# Patient Record
Sex: MALE | Race: WHITE | NOT HISPANIC OR LATINO | Employment: OTHER | URBAN - METROPOLITAN AREA
[De-identification: names, ages, dates, MRNs, and addresses within clinical notes are randomized per-mention and may not be internally consistent; named-entity substitution may affect disease eponyms.]

---

## 2018-07-22 ENCOUNTER — APPOINTMENT (OUTPATIENT)
Dept: CT IMAGING | Facility: HOSPITAL | Age: 73
End: 2018-07-22

## 2018-07-22 ENCOUNTER — HOSPITAL ENCOUNTER (EMERGENCY)
Facility: HOSPITAL | Age: 73
Discharge: HOME | End: 2018-07-22
Attending: EMERGENCY MEDICINE | Admitting: EMERGENCY MEDICINE

## 2018-07-22 VITALS
DIASTOLIC BLOOD PRESSURE: 73 MMHG | RESPIRATION RATE: 19 BRPM | WEIGHT: 191.9 LBS | OXYGEN SATURATION: 96 % | HEIGHT: 74 IN | SYSTOLIC BLOOD PRESSURE: 141 MMHG | TEMPERATURE: 97 F | HEART RATE: 44 BPM | BODY MASS INDEX: 24.63 KG/M2

## 2018-07-22 DIAGNOSIS — F03.90 DEMENTIA WITHOUT BEHAVIORAL DISTURBANCE, UNSPECIFIED DEMENTIA TYPE: Primary | ICD-10-CM

## 2018-07-22 DIAGNOSIS — R00.1 SINUS BRADYCARDIA: ICD-10-CM

## 2018-07-22 LAB
ALBUMIN SERPL-MCNC: 4.1 G/DL (ref 3.5–5.2)
ALBUMIN/GLOB SERPL: 2.1 G/DL
ALP SERPL-CCNC: 66 U/L (ref 39–117)
ALT SERPL W P-5'-P-CCNC: 6 U/L (ref 1–41)
AMPHET+METHAMPHET UR QL: NEGATIVE
ANION GAP SERPL CALCULATED.3IONS-SCNC: 12.3 MMOL/L
AST SERPL-CCNC: 11 U/L (ref 1–40)
BARBITURATES UR QL SCN: NEGATIVE
BASOPHILS # BLD AUTO: 0.05 10*3/MM3 (ref 0–0.2)
BASOPHILS NFR BLD AUTO: 0.9 % (ref 0–1.5)
BENZODIAZ UR QL SCN: NEGATIVE
BILIRUB SERPL-MCNC: 0.8 MG/DL (ref 0.1–1.2)
BILIRUB UR QL STRIP: NEGATIVE
BUN BLD-MCNC: 19 MG/DL (ref 8–23)
BUN/CREAT SERPL: 15.1 (ref 7–25)
CALCIUM SPEC-SCNC: 8.9 MG/DL (ref 8.6–10.5)
CANNABINOIDS SERPL QL: NEGATIVE
CHLORIDE SERPL-SCNC: 102 MMOL/L (ref 98–107)
CLARITY UR: CLEAR
CO2 SERPL-SCNC: 25.7 MMOL/L (ref 22–29)
COCAINE UR QL: NEGATIVE
COLOR UR: YELLOW
CREAT BLD-MCNC: 1.26 MG/DL (ref 0.76–1.27)
DEPRECATED RDW RBC AUTO: 45.7 FL (ref 37–54)
EOSINOPHIL # BLD AUTO: 0.22 10*3/MM3 (ref 0–0.7)
EOSINOPHIL NFR BLD AUTO: 3.8 % (ref 0.3–6.2)
ERYTHROCYTE [DISTWIDTH] IN BLOOD BY AUTOMATED COUNT: 13.5 % (ref 11.5–14.5)
ETHANOL BLD-MCNC: <10 MG/DL (ref 0–10)
ETHANOL UR QL: <0.01 %
GFR SERPL CREATININE-BSD FRML MDRD: 56 ML/MIN/1.73
GLOBULIN UR ELPH-MCNC: 2 GM/DL
GLUCOSE BLD-MCNC: 79 MG/DL (ref 65–99)
GLUCOSE BLDC GLUCOMTR-MCNC: 76 MG/DL (ref 70–130)
GLUCOSE UR STRIP-MCNC: NEGATIVE MG/DL
HCT VFR BLD AUTO: 43.1 % (ref 40.4–52.2)
HGB BLD-MCNC: 14.1 G/DL (ref 13.7–17.6)
HGB UR QL STRIP.AUTO: NEGATIVE
IMM GRANULOCYTES # BLD: 0.01 10*3/MM3 (ref 0–0.03)
IMM GRANULOCYTES NFR BLD: 0.2 % (ref 0–0.5)
KETONES UR QL STRIP: NEGATIVE
LEUKOCYTE ESTERASE UR QL STRIP.AUTO: NEGATIVE
LYMPHOCYTES # BLD AUTO: 2.37 10*3/MM3 (ref 0.9–4.8)
LYMPHOCYTES NFR BLD AUTO: 41 % (ref 19.6–45.3)
MCH RBC QN AUTO: 30.6 PG (ref 27–32.7)
MCHC RBC AUTO-ENTMCNC: 32.7 G/DL (ref 32.6–36.4)
MCV RBC AUTO: 93.5 FL (ref 79.8–96.2)
METHADONE UR QL SCN: NEGATIVE
MONOCYTES # BLD AUTO: 0.44 10*3/MM3 (ref 0.2–1.2)
MONOCYTES NFR BLD AUTO: 7.6 % (ref 5–12)
NEUTROPHILS # BLD AUTO: 2.7 10*3/MM3 (ref 1.9–8.1)
NEUTROPHILS NFR BLD AUTO: 46.7 % (ref 42.7–76)
NITRITE UR QL STRIP: NEGATIVE
OPIATES UR QL: NEGATIVE
OXYCODONE UR QL SCN: NEGATIVE
PH UR STRIP.AUTO: 8 [PH] (ref 5–8)
PLATELET # BLD AUTO: 220 10*3/MM3 (ref 140–500)
PMV BLD AUTO: 10.5 FL (ref 6–12)
POTASSIUM BLD-SCNC: 3.9 MMOL/L (ref 3.5–5.2)
PROT SERPL-MCNC: 6.1 G/DL (ref 6–8.5)
PROT UR QL STRIP: NEGATIVE
RBC # BLD AUTO: 4.61 10*6/MM3 (ref 4.6–6)
SODIUM BLD-SCNC: 140 MMOL/L (ref 136–145)
SP GR UR STRIP: 1.01 (ref 1–1.03)
TROPONIN T SERPL-MCNC: <0.01 NG/ML (ref 0–0.03)
UROBILINOGEN UR QL STRIP: NORMAL
WBC NRBC COR # BLD: 5.78 10*3/MM3 (ref 4.5–10.7)

## 2018-07-22 PROCEDURE — 84484 ASSAY OF TROPONIN QUANT: CPT | Performed by: EMERGENCY MEDICINE

## 2018-07-22 PROCEDURE — 93005 ELECTROCARDIOGRAM TRACING: CPT | Performed by: EMERGENCY MEDICINE

## 2018-07-22 PROCEDURE — 80307 DRUG TEST PRSMV CHEM ANLYZR: CPT | Performed by: EMERGENCY MEDICINE

## 2018-07-22 PROCEDURE — 81003 URINALYSIS AUTO W/O SCOPE: CPT | Performed by: EMERGENCY MEDICINE

## 2018-07-22 PROCEDURE — 93010 ELECTROCARDIOGRAM REPORT: CPT | Performed by: INTERNAL MEDICINE

## 2018-07-22 PROCEDURE — 85025 COMPLETE CBC W/AUTO DIFF WBC: CPT | Performed by: EMERGENCY MEDICINE

## 2018-07-22 PROCEDURE — 70450 CT HEAD/BRAIN W/O DYE: CPT

## 2018-07-22 PROCEDURE — 99285 EMERGENCY DEPT VISIT HI MDM: CPT

## 2018-07-22 PROCEDURE — 82962 GLUCOSE BLOOD TEST: CPT

## 2018-07-22 PROCEDURE — 80053 COMPREHEN METABOLIC PANEL: CPT | Performed by: EMERGENCY MEDICINE

## 2018-07-22 RX ORDER — SODIUM CHLORIDE 0.9 % (FLUSH) 0.9 %
10 SYRINGE (ML) INJECTION AS NEEDED
Status: DISCONTINUED | OUTPATIENT
Start: 2018-07-22 | End: 2018-07-22 | Stop reason: HOSPADM

## 2018-08-06 ENCOUNTER — APPOINTMENT (OUTPATIENT)
Dept: GENERAL RADIOLOGY | Facility: HOSPITAL | Age: 73
End: 2018-08-06

## 2018-08-06 ENCOUNTER — HOSPITAL ENCOUNTER (INPATIENT)
Facility: HOSPITAL | Age: 73
LOS: 7 days | Discharge: NURSING, REHAB OR LONG-TERM CARE | End: 2018-08-13
Attending: EMERGENCY MEDICINE | Admitting: ORTHOPAEDIC SURGERY

## 2018-08-06 DIAGNOSIS — I45.5 SINUS PAUSE: ICD-10-CM

## 2018-08-06 DIAGNOSIS — R26.2 DIFFICULTY WALKING: ICD-10-CM

## 2018-08-06 DIAGNOSIS — F03.91 DEMENTIA WITH BEHAVIORAL DISTURBANCE, UNSPECIFIED DEMENTIA TYPE: ICD-10-CM

## 2018-08-06 DIAGNOSIS — S72.001A CLOSED DISPLACED FRACTURE OF RIGHT FEMORAL NECK (HCC): Primary | ICD-10-CM

## 2018-08-06 LAB
ABO GROUP BLD: NORMAL
ALBUMIN SERPL-MCNC: 4.1 G/DL (ref 3.5–5.2)
ALBUMIN/GLOB SERPL: 2.2 G/DL
ALP SERPL-CCNC: 71 U/L (ref 39–117)
ALT SERPL W P-5'-P-CCNC: 11 U/L (ref 1–41)
ANION GAP SERPL CALCULATED.3IONS-SCNC: 13.9 MMOL/L
AST SERPL-CCNC: 14 U/L (ref 1–40)
BASOPHILS # BLD AUTO: 0.03 10*3/MM3 (ref 0–0.2)
BASOPHILS NFR BLD AUTO: 0.5 % (ref 0–1.5)
BILIRUB SERPL-MCNC: 0.4 MG/DL (ref 0.1–1.2)
BLD GP AB SCN SERPL QL: NEGATIVE
BUN BLD-MCNC: 19 MG/DL (ref 8–23)
BUN/CREAT SERPL: 14.6 (ref 7–25)
CALCIUM SPEC-SCNC: 8.4 MG/DL (ref 8.6–10.5)
CHLORIDE SERPL-SCNC: 105 MMOL/L (ref 98–107)
CO2 SERPL-SCNC: 24.1 MMOL/L (ref 22–29)
CREAT BLD-MCNC: 1.3 MG/DL (ref 0.76–1.27)
DEPRECATED RDW RBC AUTO: 45.5 FL (ref 37–54)
EOSINOPHIL # BLD AUTO: 0.13 10*3/MM3 (ref 0–0.7)
EOSINOPHIL NFR BLD AUTO: 2.3 % (ref 0.3–6.2)
ERYTHROCYTE [DISTWIDTH] IN BLOOD BY AUTOMATED COUNT: 13.3 % (ref 11.5–14.5)
GFR SERPL CREATININE-BSD FRML MDRD: 54 ML/MIN/1.73
GLOBULIN UR ELPH-MCNC: 1.9 GM/DL
GLUCOSE BLD-MCNC: 89 MG/DL (ref 65–99)
HCT VFR BLD AUTO: 43 % (ref 40.4–52.2)
HGB BLD-MCNC: 14.4 G/DL (ref 13.7–17.6)
IMM GRANULOCYTES # BLD: 0.01 10*3/MM3 (ref 0–0.03)
IMM GRANULOCYTES NFR BLD: 0.2 % (ref 0–0.5)
INR PPP: 1.56 (ref 0.9–1.1)
LYMPHOCYTES # BLD AUTO: 1.44 10*3/MM3 (ref 0.9–4.8)
LYMPHOCYTES NFR BLD AUTO: 25.3 % (ref 19.6–45.3)
MCH RBC QN AUTO: 31.2 PG (ref 27–32.7)
MCHC RBC AUTO-ENTMCNC: 33.5 G/DL (ref 32.6–36.4)
MCV RBC AUTO: 93.1 FL (ref 79.8–96.2)
MONOCYTES # BLD AUTO: 0.27 10*3/MM3 (ref 0.2–1.2)
MONOCYTES NFR BLD AUTO: 4.7 % (ref 5–12)
NEUTROPHILS # BLD AUTO: 3.83 10*3/MM3 (ref 1.9–8.1)
NEUTROPHILS NFR BLD AUTO: 67.2 % (ref 42.7–76)
PLATELET # BLD AUTO: 198 10*3/MM3 (ref 140–500)
PMV BLD AUTO: 10.7 FL (ref 6–12)
POTASSIUM BLD-SCNC: 4.1 MMOL/L (ref 3.5–5.2)
PROT SERPL-MCNC: 6 G/DL (ref 6–8.5)
PROTHROMBIN TIME: 18.4 SECONDS (ref 11.7–14.2)
RBC # BLD AUTO: 4.62 10*6/MM3 (ref 4.6–6)
RH BLD: POSITIVE
SODIUM BLD-SCNC: 143 MMOL/L (ref 136–145)
T&S EXPIRATION DATE: NORMAL
TROPONIN T SERPL-MCNC: <0.01 NG/ML (ref 0–0.03)
TROPONIN T SERPL-MCNC: <0.01 NG/ML (ref 0–0.03)
WBC NRBC COR # BLD: 5.7 10*3/MM3 (ref 4.5–10.7)

## 2018-08-06 PROCEDURE — 93005 ELECTROCARDIOGRAM TRACING: CPT | Performed by: EMERGENCY MEDICINE

## 2018-08-06 PROCEDURE — 80053 COMPREHEN METABOLIC PANEL: CPT | Performed by: EMERGENCY MEDICINE

## 2018-08-06 PROCEDURE — 25010000002 MORPHINE PER 10 MG: Performed by: EMERGENCY MEDICINE

## 2018-08-06 PROCEDURE — 86850 RBC ANTIBODY SCREEN: CPT | Performed by: EMERGENCY MEDICINE

## 2018-08-06 PROCEDURE — 85025 COMPLETE CBC W/AUTO DIFF WBC: CPT | Performed by: EMERGENCY MEDICINE

## 2018-08-06 PROCEDURE — 84484 ASSAY OF TROPONIN QUANT: CPT | Performed by: EMERGENCY MEDICINE

## 2018-08-06 PROCEDURE — 73560 X-RAY EXAM OF KNEE 1 OR 2: CPT

## 2018-08-06 PROCEDURE — 25010000002 LORAZEPAM PER 2 MG: Performed by: EMERGENCY MEDICINE

## 2018-08-06 PROCEDURE — 85610 PROTHROMBIN TIME: CPT | Performed by: EMERGENCY MEDICINE

## 2018-08-06 PROCEDURE — 73502 X-RAY EXAM HIP UNI 2-3 VIEWS: CPT

## 2018-08-06 PROCEDURE — 93010 ELECTROCARDIOGRAM REPORT: CPT | Performed by: INTERNAL MEDICINE

## 2018-08-06 PROCEDURE — 73552 X-RAY EXAM OF FEMUR 2/>: CPT

## 2018-08-06 PROCEDURE — 71045 X-RAY EXAM CHEST 1 VIEW: CPT

## 2018-08-06 PROCEDURE — 99285 EMERGENCY DEPT VISIT HI MDM: CPT

## 2018-08-06 PROCEDURE — 25010000002 ONDANSETRON PER 1 MG: Performed by: EMERGENCY MEDICINE

## 2018-08-06 PROCEDURE — 86901 BLOOD TYPING SEROLOGIC RH(D): CPT | Performed by: EMERGENCY MEDICINE

## 2018-08-06 PROCEDURE — 86900 BLOOD TYPING SEROLOGIC ABO: CPT | Performed by: EMERGENCY MEDICINE

## 2018-08-06 RX ORDER — POTASSIUM CHLORIDE 750 MG/1
10 TABLET, FILM COATED, EXTENDED RELEASE ORAL DAILY
COMMUNITY
End: 2018-08-13 | Stop reason: HOSPADM

## 2018-08-06 RX ORDER — CITALOPRAM 20 MG/1
20 TABLET ORAL DAILY
COMMUNITY
End: 2021-02-01 | Stop reason: HOSPADM

## 2018-08-06 RX ORDER — FUROSEMIDE 20 MG/1
20 TABLET ORAL DAILY
COMMUNITY
End: 2018-08-13 | Stop reason: HOSPADM

## 2018-08-06 RX ORDER — ATORVASTATIN CALCIUM 20 MG/1
20 TABLET, FILM COATED ORAL
COMMUNITY
End: 2021-02-01 | Stop reason: HOSPADM

## 2018-08-06 RX ORDER — SODIUM CHLORIDE 0.9 % (FLUSH) 0.9 %
10 SYRINGE (ML) INJECTION AS NEEDED
Status: DISCONTINUED | OUTPATIENT
Start: 2018-08-06 | End: 2018-08-13 | Stop reason: HOSPADM

## 2018-08-06 RX ORDER — TRAZODONE HYDROCHLORIDE 50 MG/1
25 TABLET ORAL
COMMUNITY
End: 2021-02-01 | Stop reason: HOSPADM

## 2018-08-06 RX ORDER — LORAZEPAM 2 MG/ML
0.5 INJECTION INTRAMUSCULAR ONCE
Status: DISCONTINUED | OUTPATIENT
Start: 2018-08-06 | End: 2018-08-06

## 2018-08-06 RX ORDER — LORAZEPAM 2 MG/ML
0.5 INJECTION INTRAMUSCULAR ONCE
Status: COMPLETED | OUTPATIENT
Start: 2018-08-06 | End: 2018-08-06

## 2018-08-06 RX ORDER — ONDANSETRON 2 MG/ML
4 INJECTION INTRAMUSCULAR; INTRAVENOUS ONCE
Status: COMPLETED | OUTPATIENT
Start: 2018-08-06 | End: 2018-08-06

## 2018-08-06 RX ORDER — LANOLIN ALCOHOL/MO/W.PET/CERES
1000 CREAM (GRAM) TOPICAL DAILY
COMMUNITY
End: 2021-02-01 | Stop reason: HOSPADM

## 2018-08-06 RX ORDER — BUPROPION HYDROCHLORIDE 150 MG/1
150 TABLET ORAL EVERY MORNING
COMMUNITY
End: 2021-02-01 | Stop reason: HOSPADM

## 2018-08-06 RX ORDER — DONEPEZIL HYDROCHLORIDE 5 MG/1
10 TABLET, FILM COATED ORAL NIGHTLY
COMMUNITY

## 2018-08-06 RX ADMIN — LORAZEPAM 0.5 MG: 2 INJECTION INTRAMUSCULAR; INTRAVENOUS at 19:57

## 2018-08-06 RX ADMIN — ATROPINE SULFATE 0.5 MG: 0.1 INJECTION, SOLUTION INTRAVENOUS at 19:25

## 2018-08-06 RX ADMIN — ONDANSETRON HYDROCHLORIDE 4 MG: 2 INJECTION, SOLUTION INTRAMUSCULAR; INTRAVENOUS at 21:02

## 2018-08-06 RX ADMIN — SODIUM CHLORIDE 500 ML: 9 INJECTION, SOLUTION INTRAVENOUS at 19:25

## 2018-08-06 RX ADMIN — MORPHINE SULFATE 4 MG: 4 INJECTION INTRAVENOUS at 21:03

## 2018-08-06 NOTE — ED NOTES
"Family to nursing station, states: \"I think he's having a seizure, he's not talking and he's shaking.\"  On assessment, pt exhibits tonic-clonic activity, acutely unresponsive for approx 30 seconds.  HR to asystole on monitor for approx 15 seconds; while palpating for pulse, HR up to 40 on monitor, pulse palpable.     Alphonse Shafer RN  08/06/18 1926       Alphonse Shafer RN  08/06/18 1933    "

## 2018-08-06 NOTE — PROGRESS NOTES
Clinical Pharmacy Services: Medication History    Harsha Mejia is a 73 y.o. male presenting to [facility] for   Chief Complaint   Patient presents with   • Fall       He  has no past medical history on file.    Allergies as of 08/06/2018   • (No Known Allergies)       Medication information was obtained from: Nursing home  Pharmacy and Phone Number:     Prior to Admission Medications     Prescriptions Last Dose Informant Patient Reported? Taking?    atorvastatin (LIPITOR) 20 MG tablet  Nursing Home Yes Yes    Take 20 mg by mouth every night at bedtime.    buPROPion XL (WELLBUTRIN XL) 150 MG 24 hr tablet  Nursing Home Yes Yes    Take 150 mg by mouth Every Morning.    citalopram (CeleXA) 20 MG tablet  Nursing Home Yes Yes    Take 20 mg by mouth Daily.    donepezil (ARICEPT) 5 MG tablet  Nursing Home Yes Yes    Take 5 mg by mouth every night at bedtime.    furosemide (LASIX) 20 MG tablet  Nursing Home Yes Yes    Take 20 mg by mouth Daily.    potassium chloride (K-DUR) 10 MEQ CR tablet  Nursing Home Yes Yes    Take 10 mEq by mouth Daily.    rivaroxaban (XARELTO) 20 MG tablet  Nursing Home Yes Yes    Take 20 mg by mouth Daily.    traZODone (DESYREL) 50 MG tablet  Nursing Home Yes Yes    Take 25 mg by mouth every night at bedtime.    vitamin B-12 (CYANOCOBALAMIN) 1000 MCG tablet  Nursing Home Yes Yes    Take 1,000 mcg by mouth Daily.            Medication notes: All medications added to profile per facility MAR    This medication list is complete to the best of my knowledge as of 8/6/2018    Please call if questions.    Aziza Conte, Medication History Technician  8/6/2018 7:24 PM

## 2018-08-06 NOTE — ED PROVIDER NOTES
EMERGENCY DEPARTMENT ENCOUNTER    CHIEF COMPLAINT  Chief Complaint: Fall  History given by: Pt and family  History limited by: Dementia   Room Number: 15/15  PMD: Provider, No Known      HPI:  Pt is a 73 y.o. male who presents complaining of fall that happened earlier today. Pt confirms right leg and hip pain. Pt's family states he is at the [place] nursing home and he threw a chair and fell down on his right side. Pt's family states he has hx of dementia. Pt is on Xarelto as a anticoagulant.     Duration:  Earlier today a couple hours ago   Onset: sudden  Timing: constant  Location: right leg and hip   Radiation: none stated  Quality: pain  Intensity/Severity: moderate  Progression: unchanged  Associated Symptoms: none stated  Aggravating Factors: none   Alleviating Factors: none  Previous Episodes: none stated  Treatment before arrival: none stated     PAST MEDICAL HISTORY  Active Ambulatory Problems     Diagnosis Date Noted   • No Active Ambulatory Problems     Resolved Ambulatory Problems     Diagnosis Date Noted   • No Resolved Ambulatory Problems     Past Medical History:   Diagnosis Date   • Dementia    • Hyperlipidemia        PAST SURGICAL HISTORY  History reviewed. No pertinent surgical history.    FAMILY HISTORY  History reviewed. No pertinent family history.    SOCIAL HISTORY  Social History     Social History   • Marital status: Single     Spouse name: N/A   • Number of children: N/A   • Years of education: N/A     Occupational History   • Not on file.     Social History Main Topics   • Smoking status: Former Smoker   • Smokeless tobacco: Not on file   • Alcohol use No   • Drug use: No   • Sexual activity: Defer     Other Topics Concern   • Not on file     Social History Narrative   • No narrative on file       ALLERGIES  Patient has no known allergies.    REVIEW OF SYSTEMS  Review of Systems   Unable to perform ROS: Dementia   Musculoskeletal: Positive for arthralgias (right hip pain) and myalgias  (right leg pain).       PHYSICAL EXAM  ED Triage Vitals [08/06/18 1629]   Temp Heart Rate Resp BP SpO2   -- 56 18 128/71 100 %      Temp src Heart Rate Source Patient Position BP Location FiO2 (%)   -- Monitor -- -- --       Physical Exam   Constitutional: He is oriented to person, place, and time. No distress.   Pt is confused (which is baseline), pleasantly demented.    HENT:   Head: Normocephalic and atraumatic.   Mouth/Throat: Oropharynx is clear and moist.   Eyes: Pupils are equal, round, and reactive to light. EOM are normal.   Neck: Normal range of motion. Neck supple.   Cardiovascular: Normal rate, regular rhythm and normal heart sounds.    Pulmonary/Chest: Effort normal and breath sounds normal. No respiratory distress.   Abdominal: Soft. There is no tenderness. There is no rebound and no guarding.   Musculoskeletal: Normal range of motion. He exhibits edema (pedal edema on RLE from DVT) and tenderness (right hip tenderness,  distal right femur tenderness, but no C-spine tenderness).   Neurological: He is alert and oriented to person, place, and time. He has normal sensation and normal strength.   Skin: Skin is warm and dry.   Psychiatric: Mood and affect normal.   Nursing note and vitals reviewed.    LABS:  Results for orders placed or performed during the hospital encounter of 08/06/18   Comprehensive Metabolic Panel   Result Value Ref Range    Glucose 89 65 - 99 mg/dL    BUN 19 8 - 23 mg/dL    Creatinine 1.30 (H) 0.76 - 1.27 mg/dL    Sodium 143 136 - 145 mmol/L    Potassium 4.1 3.5 - 5.2 mmol/L    Chloride 105 98 - 107 mmol/L    CO2 24.1 22.0 - 29.0 mmol/L    Calcium 8.4 (L) 8.6 - 10.5 mg/dL    Total Protein 6.0 6.0 - 8.5 g/dL    Albumin 4.10 3.50 - 5.20 g/dL    ALT (SGPT) 11 1 - 41 U/L    AST (SGOT) 14 1 - 40 U/L    Alkaline Phosphatase 71 39 - 117 U/L    Total Bilirubin 0.4 0.1 - 1.2 mg/dL    eGFR Non African Amer 54 (L) >60 mL/min/1.73    Globulin 1.9 gm/dL    A/G Ratio 2.2 g/dL    BUN/Creatinine  Ratio 14.6 7.0 - 25.0    Anion Gap 13.9 mmol/L   Protime-INR   Result Value Ref Range    Protime 18.4 (H) 11.7 - 14.2 Seconds    INR 1.56 (H) 0.90 - 1.10   Troponin   Result Value Ref Range    Troponin T <0.010 0.000 - 0.030 ng/mL   CBC Auto Differential   Result Value Ref Range    WBC 5.70 4.50 - 10.70 10*3/mm3    RBC 4.62 4.60 - 6.00 10*6/mm3    Hemoglobin 14.4 13.7 - 17.6 g/dL    Hematocrit 43.0 40.4 - 52.2 %    MCV 93.1 79.8 - 96.2 fL    MCH 31.2 27.0 - 32.7 pg    MCHC 33.5 32.6 - 36.4 g/dL    RDW 13.3 11.5 - 14.5 %    RDW-SD 45.5 37.0 - 54.0 fl    MPV 10.7 6.0 - 12.0 fL    Platelets 198 140 - 500 10*3/mm3    Neutrophil % 67.2 42.7 - 76.0 %    Lymphocyte % 25.3 19.6 - 45.3 %    Monocyte % 4.7 (L) 5.0 - 12.0 %    Eosinophil % 2.3 0.3 - 6.2 %    Basophil % 0.5 0.0 - 1.5 %    Immature Grans % 0.2 0.0 - 0.5 %    Neutrophils, Absolute 3.83 1.90 - 8.10 10*3/mm3    Lymphocytes, Absolute 1.44 0.90 - 4.80 10*3/mm3    Monocytes, Absolute 0.27 0.20 - 1.20 10*3/mm3    Eosinophils, Absolute 0.13 0.00 - 0.70 10*3/mm3    Basophils, Absolute 0.03 0.00 - 0.20 10*3/mm3    Immature Grans, Absolute 0.01 0.00 - 0.03 10*3/mm3   Troponin   Result Value Ref Range    Troponin T <0.010 0.000 - 0.030 ng/mL   Type & Screen   Result Value Ref Range    ABO Type A     RH type Positive     Antibody Screen Negative     T&S Expiration Date 8/9/2018 11:59:59 PM              RADIOLOGY  XR Chest 1 View   Final Result      XR Hip With or Without Pelvis 2 - 3 View Right   Final Result      XR Knee 1 or 2 View Right   Final Result      XR Femur 2 View Right   Final Result         XR Right Hip - shows acute nondisplaced fracture in femoral neck.   XR Right Femur and XR Right Knee- show no fractures.     I ordered the above noted radiological studies. Interpreted by radiologist. Discussed with radiologist (Dr. Valencia). Reviewed by me in PACS.       PROCEDURES  Critical Care  Performed by: EB DOWNEY  Authorized by: EB DOWNEY     Critical care  provider statement:     Critical care time (minutes):  30    Critical care time was exclusive of:  Separately billable procedures and treating other patients    Critical care was necessary to treat or prevent imminent or life-threatening deterioration of the following conditions:  Cardiac failure and circulatory failure    Critical care was time spent personally by me on the following activities:  Development of treatment plan with patient or surrogate, discussions with consultants, evaluation of patient's response to treatment, examination of patient, obtaining history from patient or surrogate, ordering and review of laboratory studies, ordering and performing treatments and interventions, ordering and review of radiographic studies, pulse oximetry, re-evaluation of patient's condition and review of old charts           EKG    EKG time: 1813  Rhythm/Rate: Kevin, 55  PVCs  No Acute Ischemia  Non-Specific ST-T changes    unchanged compared to prior on 7/22/2018.      Interpreted Contemporaneously by me.  Independently viewed by me      EKG    EKG time: 19323  Rhythm/Rate: NSR, 63  No Acute Ischemia  Non-Specific ST-T changes    unchanged compared to prior 1 hour and 15 minutes ago.     Interpreted Contemporaneously by me.  Independently viewed by me        PROGRESS AND CONSULTS       1701  XR Right Femur, XR Right Knee, XR Right Hip ordered.     1747  Rechecked pt. Pt is resting comfortably. Notified pt of unremarkable XR Right Knee and XR Hip, including a femoral neck fracture. Discussed the plan to call Orthopedic doctor and to admit pt to hospital because surgery is needed for this fracture. Pt understands and agrees with the plan, all questions answered.    1756  CXR, Pro-INR, Troponin, EKG, Type and Screen, and labs ordered. Call placed to [facility] and Ortho.     1813  Discussed pt's case with Dr. Avery ([facility]) who agrees to admit pt to hospital.     1820  Discussed pt's case with Dr. Castle (Ortho) who asked to  hold the pt's Xarelto and will do the procedure tomorrow hopefully.     1923  Pt was found to be in asystole for 15 seconds with syncope. Pt's family states he was stiff and shaking after trying to sit up in bed.    1927  Pt back at baseline. Pt denies CP, SOA. Notified family of changing plan.     1928  Atropine sulfate injection, , Repeat Troponin, Repeat EKG ordered.     1950  Rechecked pt. Pt is shaking. Pt states he does not know if he is in pain. He is not having tonic/clonic movements and is talking with me. Discussed the plan to give pain medication. Pt understands and agrees with the plan, all questions answered.    2013  Rechecked pt. Pt is resting comfortably. Discussed the plan to place call to Cardiologist. Pt understands and agrees with the plan, all questions answered.    2019  Discussed pt's case with Dr. Bermudez (Cardiologist) to consult pt and feels patient is stable for admission to telemetry bed.    2031  Morphine injection, Zofran ordered.     2035  Discussed pt's case with Dr. Avery ([facility]) who will admit pt to tele bed aware of sinus pause. Repeat EKGs and Troponins are normal.           MEDICAL DECISION MAKING  Results were reviewed/discussed with the patient and they were also made aware of online access. Pt also made aware that some labs, such as cultures, will not be resulted during ER visit and follow up with PMD is necessary.     MDM  Number of Diagnoses or Management Options  Closed nondisplaced fracture of right femoral neck (CMS/HCC):   Dementia with behavioral disturbance, unspecified dementia type:   Sinus pause:      Amount and/or Complexity of Data Reviewed  Clinical lab tests: ordered and reviewed (Troponin=<0.010)  Tests in the radiology section of CPT®: ordered and reviewed (XR Right Hip - shows acute nondisplaced fracture in femoral neck. )  Tests in the medicine section of CPT®: ordered and reviewed (See EKG Procedure note. )  Decide to obtain previous medical records or  to obtain history from someone other than the patient: yes  Obtain history from someone other than the patient: yes (Pt's family. )  Review and summarize past medical records: yes  Discuss the patient with other providers: yes (Dr. Avery ([facility]) and Dr. Castle (Ortho). )  Independent visualization of images, tracings, or specimens: yes    Critical Care  Total time providing critical care: 30-74 minutes         DIAGNOSIS  Final diagnoses:   Closed nondisplaced fracture of right femoral neck (CMS/HCC)   Dementia with behavioral disturbance, unspecified dementia type   Sinus pause       DISPOSITION  ADMISSION    Discussed treatment plan and reason for admission with pt/family and admitting physician.  Pt/family voiced understanding of the plan for admission for further testing/treatment as needed.         Latest Documented Vital Signs:  As of 8:52 PM  BP- 125/90 HR- 68 Temp- 97.8 °F (36.6 °C) (Oral) O2 sat- 94%    --  Documentation assistance provided by orin Aguilar for Dr. Jose. Information recorded by the scribe was done at my direction and has been verified and validated by me.              Eladia Kelley  08/06/18 6442       Carloz Jose MD  08/07/18 7987

## 2018-08-06 NOTE — ED TRIAGE NOTES
PT IN [facility] UN[facility], PT BECAME AGITATED AND ATTEMPTED TO THROW A CHAIR AND FELL. PT HAS RIGHT UPPER LEG PAIN AND TENDERNESS. NO HEAD INJURY

## 2018-08-06 NOTE — ED NOTES
"Pt currently acutely responsive to verbal prompting, states: \"I feel pretty relaxed right now.\"     Alphonse Shafer RN  08/06/18 1930    "

## 2018-08-07 ENCOUNTER — ANESTHESIA EVENT (OUTPATIENT)
Dept: PERIOP | Facility: HOSPITAL | Age: 73
End: 2018-08-07

## 2018-08-07 ENCOUNTER — APPOINTMENT (OUTPATIENT)
Dept: GENERAL RADIOLOGY | Facility: HOSPITAL | Age: 73
End: 2018-08-07

## 2018-08-07 ENCOUNTER — ANESTHESIA (OUTPATIENT)
Dept: PERIOP | Facility: HOSPITAL | Age: 73
End: 2018-08-07

## 2018-08-07 ENCOUNTER — APPOINTMENT (OUTPATIENT)
Dept: CARDIOLOGY | Facility: HOSPITAL | Age: 73
End: 2018-08-07
Attending: INTERNAL MEDICINE

## 2018-08-07 LAB
ANION GAP SERPL CALCULATED.3IONS-SCNC: 10.3 MMOL/L
BH CV ECHO MEAS - ACS: 2.1 CM
BH CV ECHO MEAS - AO MAX PG: 7 MMHG
BH CV ECHO MEAS - AO MEAN PG (FULL): 1 MMHG
BH CV ECHO MEAS - AO MEAN PG: 3 MMHG
BH CV ECHO MEAS - AO ROOT AREA (BSA CORRECTED): 1.6
BH CV ECHO MEAS - AO ROOT AREA: 8.6 CM^2
BH CV ECHO MEAS - AO ROOT DIAM: 3.3 CM
BH CV ECHO MEAS - AO V2 MAX: 133 CM/SEC
BH CV ECHO MEAS - AO V2 MEAN: 85.7 CM/SEC
BH CV ECHO MEAS - AO V2 VTI: 28.4 CM
BH CV ECHO MEAS - AVA(I,A): 3 CM^2
BH CV ECHO MEAS - AVA(I,D): 3 CM^2
BH CV ECHO MEAS - BSA(HAYCOCK): 2.1 M^2
BH CV ECHO MEAS - BSA: 2.1 M^2
BH CV ECHO MEAS - BZI_BMI: 24 KILOGRAMS/M^2
BH CV ECHO MEAS - BZI_METRIC_HEIGHT: 188 CM
BH CV ECHO MEAS - BZI_METRIC_WEIGHT: 84.8 KG
BH CV ECHO MEAS - CONTRAST EF 4CH: 64.5 ML/M^2
BH CV ECHO MEAS - EDV(CUBED): 125 ML
BH CV ECHO MEAS - EDV(MOD-SP4): 76 ML
BH CV ECHO MEAS - EDV(TEICH): 118.2 ML
BH CV ECHO MEAS - EF(CUBED): 68.6 %
BH CV ECHO MEAS - EF(MOD-BP): 64 %
BH CV ECHO MEAS - EF(MOD-SP4): 64.5 %
BH CV ECHO MEAS - EF(TEICH): 59.9 %
BH CV ECHO MEAS - ESV(CUBED): 39.3 ML
BH CV ECHO MEAS - ESV(MOD-SP4): 27 ML
BH CV ECHO MEAS - ESV(TEICH): 47.4 ML
BH CV ECHO MEAS - FS: 32 %
BH CV ECHO MEAS - IVS/LVPW: 1
BH CV ECHO MEAS - IVSD: 1 CM
BH CV ECHO MEAS - LAT PEAK E' VEL: 10 CM/SEC
BH CV ECHO MEAS - LV DIASTOLIC VOL/BSA (35-75): 36 ML/M^2
BH CV ECHO MEAS - LV MASS(C)D: 182 GRAMS
BH CV ECHO MEAS - LV MASS(C)DI: 86.2 GRAMS/M^2
BH CV ECHO MEAS - LV MEAN PG: 2 MMHG
BH CV ECHO MEAS - LV SYSTOLIC VOL/BSA (12-30): 12.8 ML/M^2
BH CV ECHO MEAS - LV V1 MAX: 111 CM/SEC
BH CV ECHO MEAS - LV V1 MEAN: 69.2 CM/SEC
BH CV ECHO MEAS - LV V1 VTI: 24.4 CM
BH CV ECHO MEAS - LVIDD: 5 CM
BH CV ECHO MEAS - LVIDS: 3.4 CM
BH CV ECHO MEAS - LVLD AP4: 7.3 CM
BH CV ECHO MEAS - LVLS AP4: 5.6 CM
BH CV ECHO MEAS - LVOT AREA (M): 3.5 CM^2
BH CV ECHO MEAS - LVOT AREA: 3.5 CM^2
BH CV ECHO MEAS - LVOT DIAM: 2.1 CM
BH CV ECHO MEAS - LVPWD: 1 CM
BH CV ECHO MEAS - MED PEAK E' VEL: 8 CM/SEC
BH CV ECHO MEAS - MR MAX PG: 46.8 MMHG
BH CV ECHO MEAS - MR MAX VEL: 342 CM/SEC
BH CV ECHO MEAS - MV A DUR: 0.17 SEC
BH CV ECHO MEAS - MV A MAX VEL: 66.6 CM/SEC
BH CV ECHO MEAS - MV DEC SLOPE: 129 CM/SEC^2
BH CV ECHO MEAS - MV DEC TIME: 0.24 SEC
BH CV ECHO MEAS - MV E MAX VEL: 58.2 CM/SEC
BH CV ECHO MEAS - MV E/A: 0.87
BH CV ECHO MEAS - MV MEAN PG: 1 MMHG
BH CV ECHO MEAS - MV P1/2T MAX VEL: 62.4 CM/SEC
BH CV ECHO MEAS - MV P1/2T: 141.7 MSEC
BH CV ECHO MEAS - MV V2 MEAN: 43.9 CM/SEC
BH CV ECHO MEAS - MV V2 VTI: 29.6 CM
BH CV ECHO MEAS - MVA P1/2T LCG: 3.5 CM^2
BH CV ECHO MEAS - MVA(P1/2T): 1.6 CM^2
BH CV ECHO MEAS - MVA(VTI): 2.9 CM^2
BH CV ECHO MEAS - PA ACC SLOPE: 11.6 CM/SEC^2
BH CV ECHO MEAS - PA ACC TIME: 0.13 SEC
BH CV ECHO MEAS - PA MAX PG (FULL): 0.98 MMHG
BH CV ECHO MEAS - PA MAX PG: 2.7 MMHG
BH CV ECHO MEAS - PA PR(ACCEL): 18.7 MMHG
BH CV ECHO MEAS - PA V2 MAX: 81.4 CM/SEC
BH CV ECHO MEAS - PULM A REVS DUR: 0.14 SEC
BH CV ECHO MEAS - PULM A REVS VEL: 30.6 CM/SEC
BH CV ECHO MEAS - PULM DIAS VEL: 36 CM/SEC
BH CV ECHO MEAS - PULM S/D: 0.85
BH CV ECHO MEAS - PULM SYS VEL: 30.6 CM/SEC
BH CV ECHO MEAS - PVA(V,A): 2.5 CM^2
BH CV ECHO MEAS - PVA(V,D): 2.5 CM^2
BH CV ECHO MEAS - QP/QS: 0.51
BH CV ECHO MEAS - RAP SYSTOLE: 3 MMHG
BH CV ECHO MEAS - RV MAX PG: 1.7 MMHG
BH CV ECHO MEAS - RV MEAN PG: 1 MMHG
BH CV ECHO MEAS - RV V1 MAX: 64.7 CM/SEC
BH CV ECHO MEAS - RV V1 MEAN: 40.2 CM/SEC
BH CV ECHO MEAS - RV V1 VTI: 13.7 CM
BH CV ECHO MEAS - RVOT AREA: 3.1 CM^2
BH CV ECHO MEAS - RVOT DIAM: 2 CM
BH CV ECHO MEAS - RVSP: 26 MMHG
BH CV ECHO MEAS - SI(AO): 115 ML/M^2
BH CV ECHO MEAS - SI(CUBED): 40.6 ML/M^2
BH CV ECHO MEAS - SI(LVOT): 40 ML/M^2
BH CV ECHO MEAS - SI(MOD-SP4): 23.2 ML/M^2
BH CV ECHO MEAS - SI(TEICH): 33.5 ML/M^2
BH CV ECHO MEAS - SV(AO): 242.9 ML
BH CV ECHO MEAS - SV(CUBED): 85.7 ML
BH CV ECHO MEAS - SV(LVOT): 84.5 ML
BH CV ECHO MEAS - SV(MOD-SP4): 49 ML
BH CV ECHO MEAS - SV(RVOT): 43 ML
BH CV ECHO MEAS - SV(TEICH): 70.8 ML
BH CV ECHO MEAS - TAPSE (>1.6): 2.4 CM2
BH CV ECHO MEAS - TR MAX VEL: 241 CM/SEC
BH CV ECHO MEASUREMENTS AVERAGE E/E' RATIO: 6.47
BH CV VAS BP RIGHT ARM: NORMAL MMHG
BH CV XLRA - RV BASE: 3.3 CM
BH CV XLRA - TDI S': 17 CM/SEC
BUN BLD-MCNC: 17 MG/DL (ref 8–23)
BUN/CREAT SERPL: 14 (ref 7–25)
CALCIUM SPEC-SCNC: 7.9 MG/DL (ref 8.6–10.5)
CHLORIDE SERPL-SCNC: 103 MMOL/L (ref 98–107)
CO2 SERPL-SCNC: 27.7 MMOL/L (ref 22–29)
CREAT BLD-MCNC: 1.21 MG/DL (ref 0.76–1.27)
DEPRECATED RDW RBC AUTO: 46 FL (ref 37–54)
ERYTHROCYTE [DISTWIDTH] IN BLOOD BY AUTOMATED COUNT: 13.3 % (ref 11.5–14.5)
GFR SERPL CREATININE-BSD FRML MDRD: 58 ML/MIN/1.73
GLUCOSE BLD-MCNC: 87 MG/DL (ref 65–99)
HCT VFR BLD AUTO: 39.9 % (ref 40.4–52.2)
HGB BLD-MCNC: 12.9 G/DL (ref 13.7–17.6)
LEFT ATRIUM VOLUME INDEX: 10 ML/M2
LV EF 2D ECHO EST: 64 %
MAXIMAL PREDICTED HEART RATE: 145 BPM
MCH RBC QN AUTO: 30.6 PG (ref 27–32.7)
MCHC RBC AUTO-ENTMCNC: 32.3 G/DL (ref 32.6–36.4)
MCV RBC AUTO: 94.8 FL (ref 79.8–96.2)
PLATELET # BLD AUTO: 165 10*3/MM3 (ref 140–500)
PMV BLD AUTO: 10.5 FL (ref 6–12)
POTASSIUM BLD-SCNC: 4.2 MMOL/L (ref 3.5–5.2)
RBC # BLD AUTO: 4.21 10*6/MM3 (ref 4.6–6)
SODIUM BLD-SCNC: 141 MMOL/L (ref 136–145)
STRESS TARGET HR: 123 BPM
TSH SERPL DL<=0.05 MIU/L-ACNC: 1.89 MIU/ML (ref 0.27–4.2)
WBC NRBC COR # BLD: 8.66 10*3/MM3 (ref 4.5–10.7)

## 2018-08-07 PROCEDURE — 0QS634Z REPOSITION RIGHT UPPER FEMUR WITH INTERNAL FIXATION DEVICE, PERCUTANEOUS APPROACH: ICD-10-PCS | Performed by: ORTHOPAEDIC SURGERY

## 2018-08-07 PROCEDURE — 25010000002 FENTANYL CITRATE (PF) 100 MCG/2ML SOLUTION: Performed by: ANESTHESIOLOGY

## 2018-08-07 PROCEDURE — 25010000002 MORPHINE PER 10 MG: Performed by: HOSPITALIST

## 2018-08-07 PROCEDURE — 73502 X-RAY EXAM HIP UNI 2-3 VIEWS: CPT

## 2018-08-07 PROCEDURE — 85027 COMPLETE CBC AUTOMATED: CPT | Performed by: HOSPITALIST

## 2018-08-07 PROCEDURE — C1713 ANCHOR/SCREW BN/BN,TIS/BN: HCPCS | Performed by: ORTHOPAEDIC SURGERY

## 2018-08-07 PROCEDURE — 80048 BASIC METABOLIC PNL TOTAL CA: CPT | Performed by: HOSPITALIST

## 2018-08-07 PROCEDURE — 25010000003 CEFAZOLIN IN DEXTROSE 2-4 GM/100ML-% SOLUTION: Performed by: ORTHOPAEDIC SURGERY

## 2018-08-07 PROCEDURE — 25010000002 DEXAMETHASONE PER 1 MG: Performed by: ANESTHESIOLOGY

## 2018-08-07 PROCEDURE — 93306 TTE W/DOPPLER COMPLETE: CPT | Performed by: INTERNAL MEDICINE

## 2018-08-07 PROCEDURE — 25010000002 LORAZEPAM PER 2 MG: Performed by: HOSPITALIST

## 2018-08-07 PROCEDURE — C1769 GUIDE WIRE: HCPCS | Performed by: ORTHOPAEDIC SURGERY

## 2018-08-07 PROCEDURE — 25010000002 ONDANSETRON PER 1 MG: Performed by: ANESTHESIOLOGY

## 2018-08-07 PROCEDURE — 25010000002 HALOPERIDOL LACTATE PER 5 MG: Performed by: HOSPITALIST

## 2018-08-07 PROCEDURE — 76000 FLUOROSCOPY <1 HR PHYS/QHP: CPT

## 2018-08-07 PROCEDURE — 84443 ASSAY THYROID STIM HORMONE: CPT | Performed by: INTERNAL MEDICINE

## 2018-08-07 PROCEDURE — 99232 SBSQ HOSP IP/OBS MODERATE 35: CPT | Performed by: INTERNAL MEDICINE

## 2018-08-07 PROCEDURE — 93306 TTE W/DOPPLER COMPLETE: CPT

## 2018-08-07 PROCEDURE — 25010000002 PROPOFOL 10 MG/ML EMULSION: Performed by: ANESTHESIOLOGY

## 2018-08-07 DEVICE — IMPLANTABLE DEVICE: Type: IMPLANTABLE DEVICE | Site: HIP | Status: FUNCTIONAL

## 2018-08-07 RX ORDER — ASPIRIN 81 MG/1
81 TABLET, CHEWABLE ORAL DAILY
Status: DISCONTINUED | OUTPATIENT
Start: 2018-08-07 | End: 2018-08-13 | Stop reason: HOSPADM

## 2018-08-07 RX ORDER — LIDOCAINE HYDROCHLORIDE 20 MG/ML
INJECTION, SOLUTION INFILTRATION; PERINEURAL AS NEEDED
Status: DISCONTINUED | OUTPATIENT
Start: 2018-08-07 | End: 2018-08-07 | Stop reason: SURG

## 2018-08-07 RX ORDER — HYDROCODONE BITARTRATE AND ACETAMINOPHEN 5; 325 MG/1; MG/1
1 TABLET ORAL EVERY 6 HOURS PRN
Status: DISCONTINUED | OUTPATIENT
Start: 2018-08-07 | End: 2018-08-13

## 2018-08-07 RX ORDER — FENTANYL CITRATE 50 UG/ML
INJECTION, SOLUTION INTRAMUSCULAR; INTRAVENOUS AS NEEDED
Status: DISCONTINUED | OUTPATIENT
Start: 2018-08-07 | End: 2018-08-07 | Stop reason: SURG

## 2018-08-07 RX ORDER — BUPROPION HYDROCHLORIDE 150 MG/1
150 TABLET ORAL EVERY MORNING
Status: DISCONTINUED | OUTPATIENT
Start: 2018-08-08 | End: 2018-08-13 | Stop reason: HOSPADM

## 2018-08-07 RX ORDER — MIDAZOLAM HYDROCHLORIDE 1 MG/ML
1 INJECTION INTRAMUSCULAR; INTRAVENOUS
Status: DISCONTINUED | OUTPATIENT
Start: 2018-08-07 | End: 2018-08-07 | Stop reason: HOSPADM

## 2018-08-07 RX ORDER — CEFAZOLIN SODIUM 2 G/100ML
2 INJECTION, SOLUTION INTRAVENOUS ONCE
Status: COMPLETED | OUTPATIENT
Start: 2018-08-07 | End: 2018-08-07

## 2018-08-07 RX ORDER — SODIUM CHLORIDE, SODIUM LACTATE, POTASSIUM CHLORIDE, CALCIUM CHLORIDE 600; 310; 30; 20 MG/100ML; MG/100ML; MG/100ML; MG/100ML
9 INJECTION, SOLUTION INTRAVENOUS CONTINUOUS
Status: DISCONTINUED | OUTPATIENT
Start: 2018-08-07 | End: 2018-08-09

## 2018-08-07 RX ORDER — TRAZODONE HYDROCHLORIDE 50 MG/1
25 TABLET ORAL NIGHTLY
Status: DISCONTINUED | OUTPATIENT
Start: 2018-08-07 | End: 2018-08-13 | Stop reason: HOSPADM

## 2018-08-07 RX ORDER — MIDAZOLAM HYDROCHLORIDE 1 MG/ML
2 INJECTION INTRAMUSCULAR; INTRAVENOUS
Status: DISCONTINUED | OUTPATIENT
Start: 2018-08-07 | End: 2018-08-07 | Stop reason: HOSPADM

## 2018-08-07 RX ORDER — DEXAMETHASONE SODIUM PHOSPHATE 10 MG/ML
INJECTION INTRAMUSCULAR; INTRAVENOUS AS NEEDED
Status: DISCONTINUED | OUTPATIENT
Start: 2018-08-07 | End: 2018-08-07 | Stop reason: SURG

## 2018-08-07 RX ORDER — PROPOFOL 10 MG/ML
VIAL (ML) INTRAVENOUS AS NEEDED
Status: DISCONTINUED | OUTPATIENT
Start: 2018-08-07 | End: 2018-08-07 | Stop reason: SURG

## 2018-08-07 RX ORDER — MORPHINE SULFATE 2 MG/ML
2 INJECTION, SOLUTION INTRAMUSCULAR; INTRAVENOUS EVERY 4 HOURS PRN
Status: DISCONTINUED | OUTPATIENT
Start: 2018-08-07 | End: 2018-08-09

## 2018-08-07 RX ORDER — SODIUM CHLORIDE 0.9 % (FLUSH) 0.9 %
1-10 SYRINGE (ML) INJECTION AS NEEDED
Status: DISCONTINUED | OUTPATIENT
Start: 2018-08-07 | End: 2018-08-07 | Stop reason: HOSPADM

## 2018-08-07 RX ORDER — FENTANYL CITRATE 50 UG/ML
50 INJECTION, SOLUTION INTRAMUSCULAR; INTRAVENOUS
Status: DISCONTINUED | OUTPATIENT
Start: 2018-08-07 | End: 2018-08-07 | Stop reason: HOSPADM

## 2018-08-07 RX ORDER — PANTOPRAZOLE SODIUM 40 MG/1
40 TABLET, DELAYED RELEASE ORAL
Status: DISCONTINUED | OUTPATIENT
Start: 2018-08-08 | End: 2018-08-13 | Stop reason: HOSPADM

## 2018-08-07 RX ORDER — SODIUM CHLORIDE, SODIUM LACTATE, POTASSIUM CHLORIDE, CALCIUM CHLORIDE 600; 310; 30; 20 MG/100ML; MG/100ML; MG/100ML; MG/100ML
INJECTION, SOLUTION INTRAVENOUS CONTINUOUS PRN
Status: DISCONTINUED | OUTPATIENT
Start: 2018-08-07 | End: 2018-08-07 | Stop reason: SURG

## 2018-08-07 RX ORDER — CITALOPRAM 20 MG/1
20 TABLET ORAL DAILY
Status: DISCONTINUED | OUTPATIENT
Start: 2018-08-07 | End: 2018-08-13 | Stop reason: HOSPADM

## 2018-08-07 RX ORDER — FAMOTIDINE 10 MG/ML
20 INJECTION, SOLUTION INTRAVENOUS ONCE
Status: COMPLETED | OUTPATIENT
Start: 2018-08-07 | End: 2018-08-07

## 2018-08-07 RX ORDER — FENTANYL CITRATE 50 UG/ML
INJECTION, SOLUTION INTRAMUSCULAR; INTRAVENOUS
Status: COMPLETED
Start: 2018-08-07 | End: 2018-08-07

## 2018-08-07 RX ORDER — ONDANSETRON 2 MG/ML
4 INJECTION INTRAMUSCULAR; INTRAVENOUS EVERY 6 HOURS PRN
Status: DISCONTINUED | OUTPATIENT
Start: 2018-08-07 | End: 2018-08-13

## 2018-08-07 RX ORDER — MAGNESIUM HYDROXIDE 1200 MG/15ML
LIQUID ORAL AS NEEDED
Status: DISCONTINUED | OUTPATIENT
Start: 2018-08-07 | End: 2018-08-07 | Stop reason: HOSPADM

## 2018-08-07 RX ORDER — CHOLECALCIFEROL (VITAMIN D3) 125 MCG
1000 CAPSULE ORAL DAILY
Status: DISCONTINUED | OUTPATIENT
Start: 2018-08-07 | End: 2018-08-13 | Stop reason: HOSPADM

## 2018-08-07 RX ORDER — LORAZEPAM 2 MG/ML
0.5 INJECTION INTRAMUSCULAR EVERY 4 HOURS PRN
Status: DISCONTINUED | OUTPATIENT
Start: 2018-08-07 | End: 2018-08-13

## 2018-08-07 RX ORDER — LIDOCAINE HYDROCHLORIDE 10 MG/ML
0.5 INJECTION, SOLUTION EPIDURAL; INFILTRATION; INTRACAUDAL; PERINEURAL ONCE AS NEEDED
Status: DISCONTINUED | OUTPATIENT
Start: 2018-08-07 | End: 2018-08-07 | Stop reason: HOSPADM

## 2018-08-07 RX ORDER — GLYCOPYRROLATE 0.2 MG/ML
INJECTION INTRAMUSCULAR; INTRAVENOUS AS NEEDED
Status: DISCONTINUED | OUTPATIENT
Start: 2018-08-07 | End: 2018-08-07 | Stop reason: SURG

## 2018-08-07 RX ORDER — SODIUM CHLORIDE AND POTASSIUM CHLORIDE 150; 900 MG/100ML; MG/100ML
75 INJECTION, SOLUTION INTRAVENOUS CONTINUOUS
Status: DISCONTINUED | OUTPATIENT
Start: 2018-08-07 | End: 2018-08-10

## 2018-08-07 RX ORDER — HALOPERIDOL 5 MG/ML
2 INJECTION INTRAMUSCULAR ONCE
Status: COMPLETED | OUTPATIENT
Start: 2018-08-07 | End: 2018-08-07

## 2018-08-07 RX ORDER — ONDANSETRON 2 MG/ML
INJECTION INTRAMUSCULAR; INTRAVENOUS AS NEEDED
Status: DISCONTINUED | OUTPATIENT
Start: 2018-08-07 | End: 2018-08-07 | Stop reason: SURG

## 2018-08-07 RX ORDER — LORAZEPAM 2 MG/ML
0.5 INJECTION INTRAMUSCULAR ONCE
Status: DISCONTINUED | OUTPATIENT
Start: 2018-08-07 | End: 2018-08-07

## 2018-08-07 RX ORDER — CEFAZOLIN SODIUM 2 G/100ML
2 INJECTION, SOLUTION INTRAVENOUS EVERY 8 HOURS
Status: COMPLETED | OUTPATIENT
Start: 2018-08-08 | End: 2018-08-08

## 2018-08-07 RX ORDER — DONEPEZIL HYDROCHLORIDE 5 MG/1
5 TABLET, FILM COATED ORAL NIGHTLY
Status: DISCONTINUED | OUTPATIENT
Start: 2018-08-07 | End: 2018-08-13 | Stop reason: HOSPADM

## 2018-08-07 RX ORDER — ATORVASTATIN CALCIUM 10 MG/1
10 TABLET, FILM COATED ORAL DAILY
Status: DISCONTINUED | OUTPATIENT
Start: 2018-08-07 | End: 2018-08-13 | Stop reason: HOSPADM

## 2018-08-07 RX ORDER — MORPHINE SULFATE 10 MG/ML
4 INJECTION INTRAMUSCULAR; INTRAVENOUS; SUBCUTANEOUS ONCE
Status: DISCONTINUED | OUTPATIENT
Start: 2018-08-07 | End: 2018-08-07

## 2018-08-07 RX ORDER — MORPHINE SULFATE 2 MG/ML
1 INJECTION, SOLUTION INTRAMUSCULAR; INTRAVENOUS EVERY 4 HOURS PRN
Status: DISCONTINUED | OUTPATIENT
Start: 2018-08-07 | End: 2018-08-09

## 2018-08-07 RX ADMIN — LORAZEPAM 0.5 MG: 2 INJECTION INTRAMUSCULAR; INTRAVENOUS at 21:02

## 2018-08-07 RX ADMIN — LIDOCAINE HYDROCHLORIDE 60 MG: 20 INJECTION, SOLUTION INFILTRATION; PERINEURAL at 18:07

## 2018-08-07 RX ADMIN — DEXAMETHASONE SODIUM PHOSPHATE 8 MG: 10 INJECTION INTRAMUSCULAR; INTRAVENOUS at 18:14

## 2018-08-07 RX ADMIN — ONDANSETRON 4 MG: 2 INJECTION INTRAMUSCULAR; INTRAVENOUS at 18:26

## 2018-08-07 RX ADMIN — HALOPERIDOL LACTATE 2 MG: 5 INJECTION INTRAMUSCULAR at 22:51

## 2018-08-07 RX ADMIN — MORPHINE SULFATE 1 MG: 2 INJECTION, SOLUTION INTRAMUSCULAR; INTRAVENOUS at 21:02

## 2018-08-07 RX ADMIN — PROPOFOL 175 MG: 10 INJECTION, EMULSION INTRAVENOUS at 18:07

## 2018-08-07 RX ADMIN — SODIUM CHLORIDE, POTASSIUM CHLORIDE, SODIUM LACTATE AND CALCIUM CHLORIDE 9 ML/HR: 600; 310; 30; 20 INJECTION, SOLUTION INTRAVENOUS at 17:08

## 2018-08-07 RX ADMIN — FENTANYL CITRATE 50 MCG: 50 INJECTION INTRAMUSCULAR; INTRAVENOUS at 18:27

## 2018-08-07 RX ADMIN — GLYCOPYRROLATE 0.2 MG: 0.2 INJECTION INTRAMUSCULAR; INTRAVENOUS at 18:22

## 2018-08-07 RX ADMIN — CEFAZOLIN SODIUM 2 G: 2 INJECTION, SOLUTION INTRAVENOUS at 18:18

## 2018-08-07 RX ADMIN — SODIUM CHLORIDE, POTASSIUM CHLORIDE, SODIUM LACTATE AND CALCIUM CHLORIDE: 600; 310; 30; 20 INJECTION, SOLUTION INTRAVENOUS at 18:08

## 2018-08-07 RX ADMIN — FAMOTIDINE 20 MG: 10 INJECTION INTRAVENOUS at 17:08

## 2018-08-07 NOTE — PROGRESS NOTES
Continued Stay Note  [facility]     Patient Name: Harsha Mejia  MRN: [id number]  Today's Date: 8/7/2018    Admit Date: 8/6/2018          Discharge Plan     Row Name 08/07/18 1207       Plan    Plan CCP to follow for discharge needs.  URIEL Rust    Patient/Family in Agreement with Plan yes    Plan Comments FACE SHEET VERIFIED/ IM LETTER SIGNED.   Spoke to pt and  pt's son who is pt's  guardian (Abdias Mejia [phone]) at bedside.  Requested Abdias bring in guardianship papers to be scanned.  Pt lives in [facility].  Pt's medications and meals are provided.  Pt has a MD see him at facility.  Pt is not current with HH.  Pt has not been in SNF.  Pt's son (Guardian) provided a HH list and [facility] for reference.  Son states he would like to discuss with staff at [facility] before making a decision.  CCP to follow for discharge needs.   URIEL Rust              Discharge Codes    No documentation.           Marilee Boyd RN

## 2018-08-07 NOTE — PERIOPERATIVE NURSING NOTE
Bruising left elbow medially, right wrist very warm to touch, bruise proximal R wrist anteriorly, entire wrist front to back and both sides red and warm to touch.  R leg swollen, very warm to touch.

## 2018-08-07 NOTE — ANESTHESIA PREPROCEDURE EVALUATION
Anesthesia Evaluation     Patient summary reviewed and Nursing notes reviewed   NPO Solid Status: > 8 hours  NPO Liquid Status: > 2 hours           Airway   Mallampati: II  TM distance: >3 FB  Neck ROM: full  no difficulty expected  Dental - normal exam     Pulmonary - normal exam    breath sounds clear to auscultation  (+) a smoker Former,   (-) decreased breath sounds, wheezes  Cardiovascular - normal exam  Exercise tolerance: good (4-7 METS)    Rhythm: regular  Rate: normal    (+) DVT, hyperlipidemia,   (-) hypertension      Neuro/Psych  (+) dementia,     (-) seizures, CVA  GI/Hepatic/Renal/Endo - negative ROS   (-) diabetes    Musculoskeletal     Abdominal  - normal exam   Substance History - negative use  (-) alcohol use, drug use     OB/GYN negative ob/gyn ROS         Other   (+) arthritis                     Anesthesia Plan    ASA 3     general     intravenous induction   Anesthetic plan and risks discussed with patient.    Plan discussed with CRNA.

## 2018-08-07 NOTE — PLAN OF CARE
Problem: Patient Care Overview  Goal: Plan of Care Review  Outcome: Ongoing (interventions implemented as appropriate)   08/07/18 0359   Coping/Psychosocial   Plan of Care Reviewed With patient   Plan of Care Review   Progress no change   OTHER   Outcome Summary pt c/o pain with movement. refused turns. bradycardia on monitor. npo in preparation for possible surgery. will continue to monitor.      Goal: Individualization and Mutuality  Outcome: Ongoing (interventions implemented as appropriate)    Goal: Discharge Needs Assessment  Outcome: Ongoing (interventions implemented as appropriate)      Problem: Fall Risk (Adult)  Goal: Identify Related Risk Factors and Signs and Symptoms  Outcome: Outcome(s) achieved Date Met: 08/07/18    Goal: Absence of Fall  Outcome: Ongoing (interventions implemented as appropriate)      Problem: Skin Injury Risk (Adult)  Goal: Identify Related Risk Factors and Signs and Symptoms  Outcome: Outcome(s) achieved Date Met: 08/07/18    Goal: Skin Health and Integrity  Outcome: Ongoing (interventions implemented as appropriate)      Problem: Pain, Acute (Adult)  Goal: Identify Related Risk Factors and Signs and Symptoms  Outcome: Outcome(s) achieved Date Met: 08/07/18    Goal: Acceptable Pain Control/Comfort Level  Outcome: Ongoing (interventions implemented as appropriate)

## 2018-08-07 NOTE — H&P
"History and physical    Primary care physician  Not known    Chief complaint  Status post fall  Right leg pain and right hip pain    History of present illness  73-year-old white male with history of severe dementia hyperlipidemia depression and osteoarthritis presented to [facility] emergency room after sustaining a fall with right hip pain and right leg pain.  Patient does not remember what happened.  Patient is on Xarelto for chronic DVT.  Patient workup in ER revealed right femoral neck fracture with severe bradycardia and pauses admitted for management.  At the time of interview he is alert and oriented onset all questions appropriately denies any chest pain shortness of breath dizziness lightheadedness palpitation but does not recall what happened how he fell.    PAST MEDICAL HISTORY    • Dementia     • Hyperlipidemia        PAST SURGICAL HISTORY  Surgical History   History reviewed. No pertinent surgical history.     FAMILY HISTORY  History reviewed. No pertinent family history.     SOCIAL HISTORY  Social History   Social History            Social History   • Marital status: Single       Spouse name: N/A   • Number of children: N/A   • Years of education: N/A          Occupational History   • Not on file.           Social History Main Topics   • Smoking status: Former Smoker   • Smokeless tobacco: Not on file   • Alcohol use No   • Drug use: No   • Sexual activity: Defer           Other Topics Concern   • Not on file      Social History Narrative   • No narrative on file         ALLERGIES  Patient has no known allergies.    Home medications reviewed     REVIEW OF SYSTEMS  Review of Systems   Unable to perform ROS: Dementia   Musculoskeletal: Positive for arthralgias (right hip pain) and myalgias (right leg pain).      PHYSICAL EXAM  Blood pressure 110/64, pulse 54, temperature 99.7 °F (37.6 °C), temperature source Oral, resp. rate 16, height 188 cm (74\"), weight 84.9 kg (187 lb 3.2 oz), SpO2 96 " %.    Constitutional: He is oriented to person, place, and time. No distress.   Pt is confused (which is baseline), pleasantly demented.    Head: Normocephalic and atraumatic.   Mouth/Throat: Oropharynx is clear and moist.   Eyes: Pupils are equal, round, and reactive to light. EOM are normal.   Neck: Normal range of motion. Neck supple.   Cardiovascular: Normal rate, regular rhythm and normal heart sounds.    Pulmonary/Chest: Effort normal and breath sounds normal. No respiratory distress.   Abdominal: Soft. There is no tenderness. There is no rebound and no guarding.   Musculoskeletal: Normal range of motion. He exhibits edema (pedal edema on RLE from DVT) and tenderness (right hip tenderness,  distal right femur tenderness, but no C-spine tenderness).   Neurological: He is alert and oriented to person, place, and time. He has normal sensation and normal strength.   Skin: Skin is warm and dry.   Psychiatric: Mood and affect normal.     LABS:  Lab Results (last 24 hours)     Procedure Component Value Units Date/Time    TSH [[id number]]  (Normal) Collected:  08/07/18 0634    Specimen:  Blood Updated:  08/07/18 0835     TSH 1.890 mIU/mL     Basic Metabolic Panel [[id number]]  (Abnormal) Collected:  08/07/18 0634    Specimen:  Blood Updated:  08/07/18 0744     Glucose 87 mg/dL      BUN 17 mg/dL      Creatinine 1.21 mg/dL      Sodium 141 mmol/L      Potassium 4.2 mmol/L      Chloride 103 mmol/L      CO2 27.7 mmol/L      Calcium 7.9 (L) mg/dL      eGFR Non African Amer 58 (L) mL/min/1.73      BUN/Creatinine Ratio 14.0     Anion Gap 10.3 mmol/L     Narrative:       The MDRD GFR formula is only valid for adults with stable renal function between ages 18 and 70.    CBC (No Diff) [[id number]]  (Abnormal) Collected:  08/07/18 0634    Specimen:  Blood Updated:  08/07/18 0722     WBC 8.66 10*3/mm3      RBC 4.21 (L) 10*6/mm3      Hemoglobin 12.9 (L) g/dL      Hematocrit 39.9 (L) %      MCV 94.8 fL      MCH 30.6 pg      MCHC 32.3  (L) g/dL      RDW 13.3 %      RDW-SD 46.0 fl      MPV 10.5 fL      Platelets 165 10*3/mm3     Troponin [[id number]]  (Normal) Collected:  08/06/18 1930    Specimen:  Blood Updated:  08/06/18 2008     Troponin T <0.010 ng/mL     Narrative:       Troponin T Reference Ranges:  Less than 0.03 ng/mL:    Negative for AMI  0.03 to 0.09 ng/mL:      Indeterminant for AMI  Greater than 0.09 ng/mL: Positive for AMI    Comprehensive Metabolic Panel [[id number]]  (Abnormal) Collected:  08/06/18 1802    Specimen:  Blood Updated:  08/06/18 1844     Glucose 89 mg/dL      BUN 19 mg/dL      Creatinine 1.30 (H) mg/dL      Sodium 143 mmol/L      Potassium 4.1 mmol/L      Chloride 105 mmol/L      CO2 24.1 mmol/L      Calcium 8.4 (L) mg/dL      Total Protein 6.0 g/dL      Albumin 4.10 g/dL      ALT (SGPT) 11 U/L      AST (SGOT) 14 U/L      Alkaline Phosphatase 71 U/L      Total Bilirubin 0.4 mg/dL      eGFR Non African Amer 54 (L) mL/min/1.73      Globulin 1.9 gm/dL      A/G Ratio 2.2 g/dL      BUN/Creatinine Ratio 14.6     Anion Gap 13.9 mmol/L     Narrative:       The MDRD GFR formula is only valid for adults with stable renal function between ages 18 and 70.    Troponin [[id number]]  (Normal) Collected:  08/06/18 1802    Specimen:  Blood Updated:  08/06/18 1844     Troponin T <0.010 ng/mL     Narrative:       Troponin T Reference Ranges:  Less than 0.03 ng/mL:    Negative for AMI  0.03 to 0.09 ng/mL:      Indeterminant for AMI  Greater than 0.09 ng/mL: Positive for AMI    Protime-INR [[id number]]  (Abnormal) Collected:  08/06/18 1802    Specimen:  Blood Updated:  08/06/18 1833     Protime 18.4 (H) Seconds      INR 1.56 (H)    CBC & Differential [[id number]] Collected:  08/06/18 1802    Specimen:  Blood Updated:  08/06/18 1830    Narrative:       The following orders were created for panel order CBC & Differential.  Procedure                               Abnormality         Status                     ---------                                -----------         ------                     CBC Auto Differential[[id number]]        Abnormal            Final result                 Please view results for these tests on the individual orders.    CBC Auto Differential [[id number]]  (Abnormal) Collected:  08/06/18 1802    Specimen:  Blood Updated:  08/06/18 1830     WBC 5.70 10*3/mm3      RBC 4.62 10*6/mm3      Hemoglobin 14.4 g/dL      Hematocrit 43.0 %      MCV 93.1 fL      MCH 31.2 pg      MCHC 33.5 g/dL      RDW 13.3 %      RDW-SD 45.5 fl      MPV 10.7 fL      Platelets 198 10*3/mm3      Neutrophil % 67.2 %      Lymphocyte % 25.3 %      Monocyte % 4.7 (L) %      Eosinophil % 2.3 %      Basophil % 0.5 %      Immature Grans % 0.2 %      Neutrophils, Absolute 3.83 10*3/mm3      Lymphocytes, Absolute 1.44 10*3/mm3      Monocytes, Absolute 0.27 10*3/mm3      Eosinophils, Absolute 0.13 10*3/mm3      Basophils, Absolute 0.03 10*3/mm3      Immature Grans, Absolute 0.01 10*3/mm3         Imaging Results (last 24 hours)     Procedure Component Value Units Date/Time    XR Chest 1 View [[id number]] Collected:  08/06/18 1833     Updated:  08/06/18 2050    Narrative:       ONE VIEW PORTABLE CHEST     HISTORY: Fell. Chest pain.     FINDINGS: The lungs are well-expanded and clear and the heart is  slightly enlarged. No acute abnormality is seen.     This report was finalized on 8/6/2018 8:47 PM by Dr. Jason Valencia M.D.       XR Hip With or Without Pelvis 2 - 3 View Right [[id number]] Collected:  08/06/18 1819     Updated:  08/06/18 2050    Narrative:       TWO-VIEW RIGHT HIP AND ONE VIEW AP PELVIS, 2 VIEW RIGHT FEMUR, 2 VIEW  RIGHT KNEE     HISTORY: Fell. Right hip and knee pain.     FINDINGS: There is a nondisplaced acute fracture through the neck of the  right femur. No other fractures are seen. At the knee there is some  minor joint space narrowing involving the medial compartment. There is  no fracture or joint effusion at the knee.     This report was finalized on  8/6/2018 8:46 PM by Dr. Jason Valencia M.D.       XR Knee 1 or 2 View Right [[id number]] Collected:  08/06/18 1819     Updated:  08/06/18 2050    Narrative:       TWO-VIEW RIGHT HIP AND ONE VIEW AP PELVIS, 2 VIEW RIGHT FEMUR, 2 VIEW  RIGHT KNEE     HISTORY: Fell. Right hip and knee pain.     FINDINGS: There is a nondisplaced acute fracture through the neck of the  right femur. No other fractures are seen. At the knee there is some  minor joint space narrowing involving the medial compartment. There is  no fracture or joint effusion at the knee.     This report was finalized on 8/6/2018 8:46 PM by Dr. Jason Valencia M.D.       XR Femur 2 View Right [[id number]] Collected:  08/06/18 1819     Updated:  08/06/18 2050    Narrative:       TWO-VIEW RIGHT HIP AND ONE VIEW AP PELVIS, 2 VIEW RIGHT FEMUR, 2 VIEW  RIGHT KNEE     HISTORY: Fell. Right hip and knee pain.     FINDINGS: There is a nondisplaced acute fracture through the neck of the  right femur. No other fractures are seen. At the knee there is some  minor joint space narrowing involving the medial compartment. There is  no fracture or joint effusion at the knee.     This report was finalized on 8/6/2018 8:46 PM by Dr. Jason Valencia M.D.         EKG  Rhythm/Rate: Kevin, 55  PVCs  No Acute Ischemia  Non-Specific ST-T changes  Unchanged compared to prior on 7/22/2018.      Current Facility-Administered Medications:   •  aspirin chewable tablet 81 mg, 81 mg, Oral, Daily, Santosh Avery MD  •  atorvastatin (LIPITOR) tablet 10 mg, 10 mg, Oral, Daily, Santosh Avery MD  •  [START ON 8/8/2018] buPROPion XL (WELLBUTRIN XL) 24 hr tablet 150 mg, 150 mg, Oral, Gena JACINTO Aftab, MD  •  citalopram (CeleXA) tablet 20 mg, 20 mg, Oral, Daily, Santosh Avery MD  •  donepezil (ARICEPT) tablet 5 mg, 5 mg, Oral, Nightly, Santosh Avery MD  •  morphine injection 1 mg, 1 mg, Intravenous, Q4H PRN **OR** morphine injection 2 mg, 2 mg, Intravenous, Q4H PRN, Santosh Avery MD  •  ondansetron  (ZOFRAN) injection 4 mg, 4 mg, Intravenous, Q6H PRN, Eb Avery MD  •  [START ON 8/8/2018] pantoprazole (PROTONIX) EC tablet 40 mg, 40 mg, Oral, Q AM, Eb Avery MD  •  Insert peripheral IV, , , Once **AND** sodium chloride 0.9 % flush 10 mL, 10 mL, Intravenous, PRN, Carloz Jose MD  •  sodium chloride 0.9 % with KCl 20 mEq/L infusion, 75 mL/hr, Intravenous, Continuous, Eb Avery MD  •  traZODone (DESYREL) tablet 25 mg, 25 mg, Oral, Nightly, Eb Avery MD  •  vitamin B-12 (CYANOCOBALAMIN) tablet 1,000 mcg, 1,000 mcg, Oral, Daily, Eb Avery MD    ASSESSMENT  Acute right femoral neck fracture  Severe bradycardia with pauses  Syncopal episode probably vasovagal   Dehydration  Dementia  Depression  History of DVT on Xarelto  Hyperlipidemia  Osteoarthritis    PLAN  Admit  Monitor  IV fluid  Cardiology consult for surgery clearance and severe bradycardia with pauses  Orthopedic surgery to follow patient  Pain management  Supportive care  Hold Xarelto for surgery  Stress ulcer DVT prophylaxis  Follow closely further recommendation according to hospital course    EB AVERY MD

## 2018-08-07 NOTE — ED NOTES
"Pt shaking uncontrollably.  \"Doesnt know why\"  Denies pain.  MD notified.     Wendy Carrillo RN  08/06/18 9095    "

## 2018-08-07 NOTE — ED NOTES
Pt awake, alert. Resting quietly. VSS  No acute distress noted.  Family at      Wendy Carrillo RN  08/06/18 4587

## 2018-08-07 NOTE — OP NOTE
Procedure Note    Harsha Mejia  8/6/2018 - 8/7/2018    Pre-op Diagnosis:   1.  Nondisplaced femoral neck fracture, right       Post-Op Diagnosis Codes:  Same    Procedure:  1.  Closed reduction and pinning, right femoral neck fracture    Surgeon(s):  Saqib Castle Jr., MD    Anesthesia: General    Estimated Blood Loss: 30cc    Specimens:                None      Drains:    None    Complications:   None apparent    Disposition:  Stable to PACU for recovery    Indications for procedure:    The patient is a 72 y/o male who presented with a nondisplaced right femoral neck fracture following a mechanical fall.  He was cleared for surgery by medicine and cardiology teams.  The above listed procedures were recommended. The risks/benefits of surgery, including pain, infection, wound healing problems, need for future procedures, mal/nonunion, AVN, need for conversion to arthroplasty, DVT/PE, cardiac event, and/or death were discussed, and the patient elected to proceed with surgery.      Procedure in detail:    The correct patient was identified in preoperative holding.  All risks and benefits of surgery were again discussed in detail, and the patient agreed to proceed with surgery.  The operative extremity was confirmed and marked.  Operative consent reviewed and confirmed to be signed.    At this time, the patient was wheeled to the operative theatre and placed supine on the OR table.  Anesthesia was induced smoothly by our anesthesia colleagues.  The right lower extremity was prepped and draped in standard sterile fashion.  Appropriate presurgical timeout was performed, confirming correct patient, correct extremity, correct procedure, availability of sterile instruments/implants, and the administration of intravenous antibiotics within one hour of skin incision.    At this time, fluoroscopy was used to jaden appropriate landmarks and confirm reduction of the femoral neck fracture.  A longitudinal incision was made in  line with the proximal femur.  Fascia split sharply with a knife.  Dissection carried down with the Bovie to the lateral cortex of the proximal femur.  A guide pin from the [employer] 7.3mm cannulated screw set was placed in idealized centroinferior position confirmed by biplanar fluoroscopy.  The pin guide was used to place two more cephalad wires, one anterior, and one posterior, in similar fashion to create an inverted triangle position with slight posterior bias.  Biplanar fluoroscopy confirmed idealized purchase in the head.    The depth gauge was used to select partially threaded 7.3mm screws of appropriate length.  At this time, the lateral cortex was drilled and appropriate length screws were placed with excellent purchase.  Guide wires removed.  Hardware was confirmed to be in excellent intraosseus position on all fluoroscopic views with screw threads across the fracture.  Fracture remained reduced.      At this time, wound was copiously irrigated with sterile saline.  Fascia was closed with 0 Vicryl, subcutaneous layer with buried 00 vicryl, and staples were used on the skin.  Xeroform and a sterile gauze dressing were applied.    Drapes were taken down, and the patient was awoken from anesthesia and taken to PACU for recovery without apparent complication.    IMPLANTS:  7.3mm cannulated Synthes screws  1.  110mm  2.  110mm  3.   105mm              Saqib Castle Jr, MD     Date: 8/7/2018  Time: 7:14 PM

## 2018-08-07 NOTE — CONSULTS
Patient Name: Harsha Mejia  :1945  73 y.o.    Date of Admission: 2018  Date of Consultation:  18  Encounter Provider: Teresita Metz MD  Place of Service: [facility] CARDIOLOGY  Referring Provider: Santosh Avery MD  Patient Care Team:  Provider, No Known as PCP - General      Chief complaint: S/p Fall    History of Present Illness: Mr. Harsha Mejia is a 73 year old male patient with a history of dementia, DVT on treatment with rivaroxaban,  hyperlipidemia, who is admitted with right hip fracture and syncope.     The patient was brought to the emergency room on 2018 following a fall resulting in right hip and leg pain.  The reason for the fall is unclear based on chart review.  Work up revealed a non-displaced fracture of his right femoral neck.  While in the ER he was witnessed to have a syncopal episode associated with a 15 second pause.  This apparently occurred after he attempted to sit up.  It is unclear if it was associated with increased pain.  The patient received atropine injection and IV fluids but this seems to be following the pause.  The patient denies any complaints except for right hip pain to me this morning. The patient does not recall any events from yesterday.  He can not tell me how his fall occurred yesterday.  There is no family at bedside at the time of my interview with the patient.          Past Medical History:   Diagnosis Date   • Dementia    • DVT (deep venous thrombosis) (CMS/[facility])    • Hyperlipidemia        History reviewed. No pertinent surgical history.      Prior to Admission medications    Medication Sig Start Date End Date Taking? Authorizing Provider   atorvastatin (LIPITOR) 20 MG tablet Take 20 mg by mouth every night at bedtime.   Yes Toñito Aviles MD   buPROPion XL (WELLBUTRIN XL) 150 MG 24 hr tablet Take 150 mg by mouth Every Morning.   Yes Toñito Aviles MD   citalopram (CeleXA) 20 MG tablet Take 20 mg by  "mouth Daily.   Yes ProviderToñito MD   donepezil (ARICEPT) 5 MG tablet Take 5 mg by mouth every night at bedtime.   Yes Toñito Aviles MD   furosemide (LASIX) 20 MG tablet Take 20 mg by mouth Daily.   Yes ProviderToñito MD   potassium chloride (K-DUR) 10 MEQ CR tablet Take 10 mEq by mouth Daily.   Yes Toñito Aviles MD   rivaroxaban (XARELTO) 20 MG tablet Take 20 mg by mouth Daily.   Yes Toñito Aviles MD   traZODone (DESYREL) 50 MG tablet Take 25 mg by mouth every night at bedtime.   Yes ProviderToñito MD   vitamin B-12 (CYANOCOBALAMIN) 1000 MCG tablet Take 1,000 mcg by mouth Daily.   Yes ProviderToñito MD       No Known Allergies    Social History     Social History   • Marital status: Single     Social History Main Topics   • Smoking status: Former Smoker   • Alcohol use No   • Drug use: No   • Sexual activity: Defer     Other Topics Concern   • Not on file       History reviewed. No pertinent family history.    REVIEW OF SYSTEMS:   All systems reviewed.  Pertinent positives identified in HPI.  All other systems are negative.      Objective:     Vitals:    08/06/18 2240 08/07/18 0135 08/07/18 0243 08/07/18 0731   BP: 125/69   110/64   BP Location: Left arm   Left arm   Patient Position: Lying   Lying   Pulse: 54 (!) 47 51 52   Resp: 18   18   Temp:    99.7 °F (37.6 °C)   TempSrc: Oral   Oral   SpO2: 98% 94% 96% 95%   Weight:       Height: 188 cm (74\")        Body mass index is 24.04 kg/m².    General Appearance:    Alert, cooperative, in no acute distress   Head:    Normocephalic, without obvious abnormality, atraumatic   Eyes:            Lids and lashes normal, conjunctivae and sclerae normal, no   icterus, no pallor, corneas clear, PERRLA   Ears:    Ears appear intact with no abnormalities noted   Neck:   No adenopathy, supple, trachea midline, no thyromegaly, no   carotid bruit, no JVD   Lungs:     Clear to auscultation,respirations regular, even and unlabored "    Heart:    Regular rhythm and normal rate, normal S1 and S2, no murmur, no gallop, no rub, no click   Chest Wall:    No abnormalities observed   Abdomen:     Normal bowel sounds, no masses, no organomegaly, soft        non-tender, non-distended, no guarding, no rebound  tenderness   Extremities:   Moves all extremities well, no edema, no cyanosis, no redness   Pulses:   Pulses palpable and equal bilaterally. Normal radial, carotid, femoral, dorsalis pedis and posterior tibial pulses bilaterally. Normal abdominal aorta   Skin:  Psychiatric:   No bleeding, bruising or rash    Alert and oriented x 3, normal mood and affect   Lab Review:       Results from last 7 days  Lab Units 08/07/18  0634 08/06/18  1802   SODIUM mmol/L 141 143   POTASSIUM mmol/L 4.2 4.1   CHLORIDE mmol/L 103 105   CO2 mmol/L 27.7 24.1   BUN mg/dL 17 19   CREATININE mg/dL 1.21 1.30*   CALCIUM mg/dL 7.9* 8.4*   BILIRUBIN mg/dL  --  0.4   ALK PHOS U/L  --  71   ALT (SGPT) U/L  --  11   AST (SGOT) U/L  --  14   GLUCOSE mg/dL 87 89       Results from last 7 days  Lab Units 08/06/18  1930 08/06/18  1802   TROPONIN T ng/mL <0.010 <0.010       Results from last 7 days  Lab Units 08/07/18  0634   WBC 10*3/mm3 8.66   HEMOGLOBIN g/dL 12.9*   HEMATOCRIT % 39.9*   PLATELETS 10*3/mm3 165       Results from last 7 days  Lab Units 08/06/18  1802   INR  1.56*          TELE 8/7/18 7:18 a.m.        EKG 8/6/18      EKG 8/6/18      EKG 7/22/18              I personally viewed and interpreted the patient's EKG/Telemetry data.        Assessment and Plan:       1. Syncope/bradycardia.  I reviewed the telemetry strips from his episode of syncope associated with a prolonged pause.   Leading up to his pause he appeared to be in sinus rhythm with slowing of his heart rate but no evidence of sinus pauses, arrest or AV block.  These findings are suggestive of vasovagal syncope likely from increased hip pain while the patient was repositioning himself.    2. Right femoral neck  fracture.  Tentative plan for surgery later today.   3. DVT.  On therapy with rivaroxaban.    4. Hyperlipidemia  5. Dementia    -  Will check a TSH and echocardiogram  -  As above, after review of telemetry associated with syncopal episode I strongly suspect this was due to vasovagal syncope associated with pain after patient repositioned himself.  Would not recommend permanent pacemaker placement unless there is evidence of advanced sinus vashti disease or high degree AV block.  Would monitor his rhythm for now.  I do not think he needs a temporary pacer wire for surgery but that he could be managed with atropine as needed if he has a recurrent episode.           Teresita Metz MD  08/07/18  8:51 AM    ADDENDUM:  Echo and TSH both unremarkable.  Ok to proceed with surgery.  No further events on telemetry.  He is ok to proceed with surgery.  Recommend atropine as needed if has any significant bradycardia during surgery.

## 2018-08-07 NOTE — PLAN OF CARE
Problem: Patient Care Overview  Goal: Plan of Care Review  Outcome: Ongoing (interventions implemented as appropriate)   08/07/18 1700   Coping/Psychosocial   Plan of Care Reviewed With patient;son   Plan of Care Review   Progress no change   OTHER   Outcome Summary confused all day--sometimes able to reorient, other times not. down for surgery at 1630. son at bedside. VSS--HR SB in the 50s, no ectopy; on RA and BP wnl. echo completed, ef 64%--MORE Frye RN     Goal: Individualization and Mutuality  Outcome: Ongoing (interventions implemented as appropriate)    Goal: Discharge Needs Assessment  Outcome: Ongoing (interventions implemented as appropriate)    Goal: Interprofessional Rounds/Family Conf  Outcome: Ongoing (interventions implemented as appropriate)      Problem: Fall Risk (Adult)  Goal: Absence of Fall  Outcome: Ongoing (interventions implemented as appropriate)

## 2018-08-07 NOTE — CONSULTS
Ortho (on-call MD unless specified)  Consult performed by: JV LARIOS JR  Consult ordered by: IESHA MORILLO          Patient Care Team:  Provider, No Known as PCP - General    Chief complaint: Right hip pain    Subjective     History of Present Illness     The patient is a 73-year-old male with multiple medical morbidities including hyperlipidemia, dementia  presenting with right hip pain following a mechanical fall.  He normally lives in an assisted living facility.  He lost his balance and fell yesterday.  He had immediate right hip pain and an inability to bear weight.  She was brought to the ED where radiographs confirmed a nondisplaced right femoral neck fracture.  He was admitted to the medicine service for preoperative optimization/evaluation.  Orthopaedics was consulted for evaluation and management.    The patient is normally a community ambulator using a walker for assistance.    The patient reports pain in the hip.  Pain can be a 9 out of 10, sharp and aching, worse with any movement and better with rest.    The patient denies pain elsewhere.    The patient otherwise feels well, denies current CP, SOB, fevers, chills, nausea, dizziness.    Of note, patient currently taking Xarelto due to right lower extremity DVT.  Last dose yesterday 0800.    Review of Systems   Review of Systems:  Constitutional: Negative  HENT: Negative  Eyes: Negative  Respiratory: Negative; no shortness of breath  Cardiovascular: Negative; no current chest pain  Gastrointestinal: Negative  : Negative  Skin: Negative except as listed in HPI  Neurological: Negative, no numbness or deficits  Hematological: Negative  Muscoloskeletal: Per HPI       Past Medical History:   Diagnosis Date   • Dementia    • Hyperlipidemia    , History reviewed. No pertinent surgical history., History reviewed. No pertinent family history.,   Social History   Substance Use Topics   • Smoking status: Former Smoker   • Smokeless tobacco: Not on file   •  Alcohol use No   ,   (Not in a hospital admission), Scheduled Meds:   , Continuous Infusions:   , PRN Meds:  •  Insert peripheral IV **AND** sodium chloride and Allergies:  Patient has no known allergies.    Objective      Vital Signs  Temp:  [97.8 °F (36.6 °C)] 97.8 °F (36.6 °C)  Heart Rate:  [55-68] 68  Resp:  [18-20] 20  BP: (125-172)/(71-95) 125/90    Physical Exam  Physical Exam:  Vital signs reviewed.  Constitutional:  Appears frail.  HEENT:  Head: normocephalic, atraumatic  Eyes: EOMI, grossly normal.  No scleral icterus.  Neck: Normal range of motion.  Supple, no tracheal deviation.  Cardiovascular: Regular rate.  Pulmonary: Effort normal, symmetric chest expansion, no labored breathing.  Abdominal: Soft, non distended  Neurological: No gross deficits, some confusion but responds to direct questions appropriately.  Skin: Warm and dry  Psychiatric: Normal mood and affect  Musculoskeletal:  Right lower extremity is not shortened or externally rotated.  Severe pain with logroll of the hip.  No tenderness with palpation of the distal femur or knee.  Active ankle dorsiflexion and plantarflexion.  Sensation is intact to light touch in sural, saphenous, deep and superficial peroneal, tibial nerve distribution.  Toes are warm and well perfused with brisk capillary refill.        Results Review:    Radiographs of the right femur/hip demonstrate a nondisplaced femoral neck fracture.  No significant displacement on the lateral.        Assessment/Plan     Active Problems:    Closed displaced fracture of right femoral neck (CMS/HCC)      Assessment & Plan    The patient is a 73-year-old male with multiple medical morbidities including hyperlipidemia, dementia  presenting with a nondisplaced right femoral neck fracture.     I have recommended operative stabilization in the form of closed reduction and percutaneous pinning.  We discussed the possibility of conversion to hemiarthroplasty if the fracture displaces.     I  discussed the very serious nature of hip fractures and hip fracture surgery with the patient and family.  They understand the risks of critical pulmonary and cardiac events and death in the perioperative and postoperative period associated with these injuries in the elderly.  They also understand that the mortality risk is incredibly high if no surgery is performed if the patient cannot mobilize due to the hip fracture.     The risks/benefits of surgery, including pain, infection, wound healing problems, hematoma, failure of fixation, need for future procedures, persistent pain, pain requiring conversion to arthroplasty, iatrogenic nerve and/or blood vessel injury resulting in permanent weakness, numbness, or dysfunction, positioning related neuropraxia, mal/nonunion, DVT/PE, cardiac event, and/or death were discussed, and the patient elected to proceed with surgery.     He will obviously be at significantly increased risk of perioperative thromboembolic events (eg, PE, death) given his current DVT and the need to stop his anticoagulation.        - Please keep NPO for surgery later today  - Appreciate LHA/medicine team assistance, has been cleared for surgery  - Please hold Xarelto/any formal chemoprophylaxis pending surgery, GABBI/SCDs for now  - [facility] [place]     Thank you for this consultation, will follow.    Saqib Castle Jr, MD  08/06/18  8:15 PM

## 2018-08-08 ENCOUNTER — APPOINTMENT (OUTPATIENT)
Dept: GENERAL RADIOLOGY | Facility: HOSPITAL | Age: 73
End: 2018-08-08
Attending: ORTHOPAEDIC SURGERY

## 2018-08-08 LAB
ALBUMIN SERPL-MCNC: 3.7 G/DL (ref 3.5–5.2)
ALBUMIN/GLOB SERPL: 1.8 G/DL
ALP SERPL-CCNC: 63 U/L (ref 39–117)
ALT SERPL W P-5'-P-CCNC: 10 U/L (ref 1–41)
ANION GAP SERPL CALCULATED.3IONS-SCNC: 15.1 MMOL/L
AST SERPL-CCNC: 16 U/L (ref 1–40)
BASOPHILS # BLD AUTO: 0.01 10*3/MM3 (ref 0–0.2)
BASOPHILS NFR BLD AUTO: 0.1 % (ref 0–1.5)
BILIRUB SERPL-MCNC: 0.5 MG/DL (ref 0.1–1.2)
BUN BLD-MCNC: 18 MG/DL (ref 8–23)
BUN/CREAT SERPL: 14.3 (ref 7–25)
CALCIUM SPEC-SCNC: 8.6 MG/DL (ref 8.6–10.5)
CHLORIDE SERPL-SCNC: 101 MMOL/L (ref 98–107)
CHOLEST SERPL-MCNC: 135 MG/DL (ref 0–200)
CO2 SERPL-SCNC: 23.9 MMOL/L (ref 22–29)
CREAT BLD-MCNC: 1.26 MG/DL (ref 0.76–1.27)
DEPRECATED RDW RBC AUTO: 44.8 FL (ref 37–54)
EOSINOPHIL # BLD AUTO: 0 10*3/MM3 (ref 0–0.7)
EOSINOPHIL NFR BLD AUTO: 0 % (ref 0.3–6.2)
ERYTHROCYTE [DISTWIDTH] IN BLOOD BY AUTOMATED COUNT: 12.9 % (ref 11.5–14.5)
GFR SERPL CREATININE-BSD FRML MDRD: 56 ML/MIN/1.73
GLOBULIN UR ELPH-MCNC: 2.1 GM/DL
GLUCOSE BLD-MCNC: 127 MG/DL (ref 65–99)
HBA1C MFR BLD: 4.9 % (ref 4.8–5.6)
HCT VFR BLD AUTO: 42.8 % (ref 40.4–52.2)
HDLC SERPL-MCNC: 58 MG/DL (ref 40–60)
HGB BLD-MCNC: 13.5 G/DL (ref 13.7–17.6)
IMM GRANULOCYTES # BLD: 0.02 10*3/MM3 (ref 0–0.03)
IMM GRANULOCYTES NFR BLD: 0.2 % (ref 0–0.5)
LDLC SERPL CALC-MCNC: 66 MG/DL (ref 0–100)
LDLC/HDLC SERPL: 1.14 {RATIO}
LYMPHOCYTES # BLD AUTO: 0.86 10*3/MM3 (ref 0.9–4.8)
LYMPHOCYTES NFR BLD AUTO: 8.1 % (ref 19.6–45.3)
MCH RBC QN AUTO: 30.1 PG (ref 27–32.7)
MCHC RBC AUTO-ENTMCNC: 31.5 G/DL (ref 32.6–36.4)
MCV RBC AUTO: 95.3 FL (ref 79.8–96.2)
MONOCYTES # BLD AUTO: 0.81 10*3/MM3 (ref 0.2–1.2)
MONOCYTES NFR BLD AUTO: 7.6 % (ref 5–12)
NEUTROPHILS # BLD AUTO: 8.95 10*3/MM3 (ref 1.9–8.1)
NEUTROPHILS NFR BLD AUTO: 84 % (ref 42.7–76)
NT-PROBNP SERPL-MCNC: 670.7 PG/ML (ref 0–1800)
PLATELET # BLD AUTO: 159 10*3/MM3 (ref 140–500)
PMV BLD AUTO: 10.5 FL (ref 6–12)
POTASSIUM BLD-SCNC: 4.1 MMOL/L (ref 3.5–5.2)
PROT SERPL-MCNC: 5.8 G/DL (ref 6–8.5)
RBC # BLD AUTO: 4.49 10*6/MM3 (ref 4.6–6)
SODIUM BLD-SCNC: 140 MMOL/L (ref 136–145)
TRIGL SERPL-MCNC: 55 MG/DL (ref 0–150)
TSH SERPL DL<=0.05 MIU/L-ACNC: 1.38 MIU/ML (ref 0.27–4.2)
VLDLC SERPL-MCNC: 11 MG/DL (ref 5–40)
WBC NRBC COR # BLD: 10.65 10*3/MM3 (ref 4.5–10.7)

## 2018-08-08 PROCEDURE — 83880 ASSAY OF NATRIURETIC PEPTIDE: CPT | Performed by: HOSPITALIST

## 2018-08-08 PROCEDURE — 25010000003 CEFAZOLIN IN DEXTROSE 2-4 GM/100ML-% SOLUTION: Performed by: ORTHOPAEDIC SURGERY

## 2018-08-08 PROCEDURE — 83036 HEMOGLOBIN GLYCOSYLATED A1C: CPT | Performed by: HOSPITALIST

## 2018-08-08 PROCEDURE — 73502 X-RAY EXAM HIP UNI 2-3 VIEWS: CPT

## 2018-08-08 PROCEDURE — 25010000002 ONDANSETRON PER 1 MG: Performed by: HOSPITALIST

## 2018-08-08 PROCEDURE — 97162 PT EVAL MOD COMPLEX 30 MIN: CPT

## 2018-08-08 PROCEDURE — 97110 THERAPEUTIC EXERCISES: CPT

## 2018-08-08 PROCEDURE — 80061 LIPID PANEL: CPT | Performed by: HOSPITALIST

## 2018-08-08 PROCEDURE — 25010000002 MORPHINE PER 10 MG: Performed by: HOSPITALIST

## 2018-08-08 PROCEDURE — 80053 COMPREHEN METABOLIC PANEL: CPT | Performed by: HOSPITALIST

## 2018-08-08 PROCEDURE — 25010000002 LORAZEPAM PER 2 MG: Performed by: HOSPITALIST

## 2018-08-08 PROCEDURE — 85025 COMPLETE CBC W/AUTO DIFF WBC: CPT | Performed by: HOSPITALIST

## 2018-08-08 PROCEDURE — 99232 SBSQ HOSP IP/OBS MODERATE 35: CPT | Performed by: INTERNAL MEDICINE

## 2018-08-08 PROCEDURE — 84443 ASSAY THYROID STIM HORMONE: CPT | Performed by: HOSPITALIST

## 2018-08-08 RX ADMIN — CITALOPRAM 20 MG: 20 TABLET, FILM COATED ORAL at 08:22

## 2018-08-08 RX ADMIN — LORAZEPAM 0.5 MG: 2 INJECTION INTRAMUSCULAR; INTRAVENOUS at 01:04

## 2018-08-08 RX ADMIN — ATORVASTATIN CALCIUM 10 MG: 10 TABLET, FILM COATED ORAL at 08:22

## 2018-08-08 RX ADMIN — LORAZEPAM 0.5 MG: 2 INJECTION INTRAMUSCULAR; INTRAVENOUS at 15:57

## 2018-08-08 RX ADMIN — TRAZODONE HYDROCHLORIDE 25 MG: 50 TABLET, FILM COATED ORAL at 21:53

## 2018-08-08 RX ADMIN — DONEPEZIL HYDROCHLORIDE 5 MG: 5 TABLET, FILM COATED ORAL at 21:53

## 2018-08-08 RX ADMIN — LORAZEPAM 0.5 MG: 2 INJECTION INTRAMUSCULAR; INTRAVENOUS at 23:55

## 2018-08-08 RX ADMIN — BUPROPION HYDROCHLORIDE 150 MG: 150 TABLET, EXTENDED RELEASE ORAL at 08:22

## 2018-08-08 RX ADMIN — CEFAZOLIN SODIUM 2 G: 2 INJECTION, SOLUTION INTRAVENOUS at 11:21

## 2018-08-08 RX ADMIN — ASPIRIN 81 MG: 81 TABLET, CHEWABLE ORAL at 08:22

## 2018-08-08 RX ADMIN — CEFAZOLIN SODIUM 2 G: 2 INJECTION, SOLUTION INTRAVENOUS at 02:17

## 2018-08-08 RX ADMIN — PANTOPRAZOLE SODIUM 40 MG: 40 TABLET, DELAYED RELEASE ORAL at 08:22

## 2018-08-08 RX ADMIN — ONDANSETRON HYDROCHLORIDE 4 MG: 2 INJECTION, SOLUTION INTRAMUSCULAR; INTRAVENOUS at 08:23

## 2018-08-08 RX ADMIN — Medication 1000 MCG: at 08:22

## 2018-08-08 RX ADMIN — MORPHINE SULFATE 1 MG: 2 INJECTION, SOLUTION INTRAMUSCULAR; INTRAVENOUS at 01:04

## 2018-08-08 RX ADMIN — RIVAROXABAN 20 MG: 20 TABLET, FILM COATED ORAL at 18:48

## 2018-08-08 RX ADMIN — CEFAZOLIN SODIUM 2 G: 2 INJECTION, SOLUTION INTRAVENOUS at 18:44

## 2018-08-08 NOTE — PLAN OF CARE
Problem: Patient Care Overview  Goal: Plan of Care Review   08/08/18 8499   Coping/Psychosocial   Plan of Care Reviewed With patient   OTHER   Outcome Summary Pt presents with impaired bed mob, txfs, gait, and ballance following fall, R femur fx, and pinning surgery. He was pleasant and cooperative during therapy but confused. He will benefit from skilled PT to address deficits to facilitate recovry and improved function.

## 2018-08-08 NOTE — PROGRESS NOTES
Hospital Follow Up    Chief Complaint: Follow up syncope, bradycardia    Interval History: Events overnight reviewed.  Drowsy and sleeping comfortably this morning.  Woke up briefly during my exam.  In restraints.  Status post right hip fracture repair.  No events on telemetry.     Objective:     Objective:  Temp:  [98 °F (36.7 °C)-99.7 °F (37.6 °C)] 98 °F (36.7 °C)  Heart Rate:  [52-83] 67  Resp:  [16-20] 18  BP: (110-155)/(61-98) 118/75     Intake/Output Summary (Last 24 hours) at 08/08/18 0714  Last data filed at 08/08/18 0500   Gross per 24 hour   Intake              560 ml   Output              625 ml   Net              -65 ml     Body mass index is 24.04 kg/m².  1    08/06/18  1658   Weight: 84.9 kg (187 lb 3.2 oz)     Weight change:       Physical Exam:   General : drowsy, asleep  Neuro: drowsy  Lungs: CTAB. Normal respiratory effort and rate.  CV:: Regular rate and rhythm, normal S1 and S2, no murmurs, gallops or rubs.  ABD: Soft, nontender, non-distended. positive bowel sounds  Extr: No edema or cyanosis, moves all extremities    Lab Review:     Results from last 7 days  Lab Units 08/07/18  0634 08/06/18  1802   SODIUM mmol/L 141 143   POTASSIUM mmol/L 4.2 4.1   CHLORIDE mmol/L 103 105   CO2 mmol/L 27.7 24.1   BUN mg/dL 17 19   CREATININE mg/dL 1.21 1.30*   GLUCOSE mg/dL 87 89   CALCIUM mg/dL 7.9* 8.4*   AST (SGOT) U/L  --  14   ALT (SGPT) U/L  --  11       Results from last 7 days  Lab Units 08/06/18  1930 08/06/18  1802   TROPONIN T ng/mL <0.010 <0.010       Results from last 7 days  Lab Units 08/07/18  0634 08/06/18  1802   WBC 10*3/mm3 8.66 5.70   HEMOGLOBIN g/dL 12.9* 14.4   HEMATOCRIT % 39.9* 43.0   PLATELETS 10*3/mm3 165 198       Results from last 7 days  Lab Units 08/06/18  1802   INR  1.56*               Invalid input(s): LDLCALC        Results from last 7 days  Lab Units 08/07/18  0634   TSH mIU/mL 1.890     I reviewed the patient's new clinical results.  I personally viewed and interpreted  the patient's EKG  Current Medications:   Scheduled Meds:  aspirin 81 mg Oral Daily   atorvastatin 10 mg Oral Daily   buPROPion  mg Oral QAM   ceFAZolin 2 g Intravenous Q8H   citalopram 20 mg Oral Daily   donepezil 5 mg Oral Nightly   pantoprazole 40 mg Oral Q AM   rivaroxaban 20 mg Oral Daily With Dinner   traZODone 25 mg Oral Nightly   vitamin B-12 1,000 mcg Oral Daily     Continuous Infusions:  lactated ringers 9 mL/hr Last Rate: 9 mL/hr (08/07/18 1708)   sodium chloride 0.9 % with KCl 20 mEq 75 mL/hr Last Rate: Stopped (08/07/18 1630)       Allergies:  No Known Allergies    Assessment/Plan:     1. Syncope.  Appears to be vasovagal syncope.  No further episodes since admission.    2. Right femoral neck fracture.  Status post closed reduction and pinning.  POD #1.   3. DVT.  On therapy with rivaroxaban which was resumed following surgery.   4. Hyperlipidemia  5. Dementia     -  Continue to monitor rhythm.  May need to consider monitor patient at discharge if there is any indication that syncope or near syncope preceded his fall and hip fracture prior to admit.  At this point it is unclear what was the precipitating cause of his fall.     Teresita Metz MD  08/08/18  7:14 AM

## 2018-08-08 NOTE — PROGRESS NOTES
"Daily progress note    Chief complaint  Pleasantly confused  No specific complaints  Status post surgery    History of present illness  73-year-old white male with history of severe dementia hyperlipidemia depression and osteoarthritis presented to [facility] emergency room after sustaining a fall with right hip pain and right leg pain.  Patient does not remember what happened.  Patient is on Xarelto for chronic DVT.  Patient workup in ER revealed right femoral neck fracture with severe bradycardia and pauses admitted for management.  At the time of interview he is alert and oriented onset all questions appropriately denies any chest pain shortness of breath dizziness lightheadedness palpitation but does not recall what happened how he fell.     REVIEW OF SYSTEMS  Review of Systems   Unable to perform ROS: Dementia   Musculoskeletal: Positive for arthralgias (right hip pain) and myalgias (right leg pain).      PHYSICAL EXAM  Blood pressure 125/75, pulse 64, temperature 99.1 °F (37.3 °C), temperature source Oral, resp. rate 16, height 188 cm (74\"), weight 84.9 kg (187 lb 3.2 oz), SpO2 95 %.    Constitutional: He is oriented to person, place, and time. No distress.   Pt is confused (which is baseline), pleasantly demented.    Head: Normocephalic and atraumatic.   Mouth/Throat: Oropharynx is clear and moist.   Eyes: Pupils are equal, round, and reactive to light. EOM are normal.   Neck: Normal range of motion. Neck supple.   Cardiovascular: Normal rate, regular rhythm and normal heart sounds.    Pulmonary/Chest: Effort normal and breath sounds normal. No respiratory distress.   Abdominal: Soft. There is no tenderness. There is no rebound and no guarding.   Musculoskeletal: Normal range of motion. He exhibits edema (pedal edema on RLE from DVT) and tenderness (right hip tenderness,  distal right femur tenderness, but no C-spine tenderness).   Neurological: He is alert and oriented to person, place, and time. He " has normal sensation and normal strength.   Skin: Skin is warm and dry.   Psychiatric: Mood and affect normal.     LABS:  Lab Results (last 24 hours)     Procedure Component Value Units Date/Time    BNP [[id number]]  (Normal) Collected:  08/08/18 0656    Specimen:  Blood Updated:  08/08/18 0754     proBNP 670.7 pg/mL     Narrative:       Among patients with dyspnea, NT-proBNP is highly sensitive for the detection of acute congestive heart failure. In addition NT-proBNP of <300 pg/ml effectively rules out acute congestive heart failure with 99% negative predictive value.    TSH [[id number]]  (Normal) Collected:  08/08/18 0656    Specimen:  Blood Updated:  08/08/18 0754     TSH 1.380 mIU/mL     Comprehensive Metabolic Panel [[id number]]  (Abnormal) Collected:  08/08/18 0656    Specimen:  Blood Updated:  08/08/18 0742     Glucose 127 (H) mg/dL      BUN 18 mg/dL      Creatinine 1.26 mg/dL      Sodium 140 mmol/L      Potassium 4.1 mmol/L      Chloride 101 mmol/L      CO2 23.9 mmol/L      Calcium 8.6 mg/dL      Total Protein 5.8 (L) g/dL      Albumin 3.70 g/dL      ALT (SGPT) 10 U/L      AST (SGOT) 16 U/L      Alkaline Phosphatase 63 U/L      Total Bilirubin 0.5 mg/dL      eGFR Non African Amer 56 (L) mL/min/1.73      Globulin 2.1 gm/dL      A/G Ratio 1.8 g/dL      BUN/Creatinine Ratio 14.3     Anion Gap 15.1 mmol/L     Narrative:       The MDRD GFR formula is only valid for adults with stable renal function between ages 18 and 70.    Lipid Panel [[id number]] Collected:  08/08/18 0656    Specimen:  Blood Updated:  08/08/18 0742     Total Cholesterol 135 mg/dL      Triglycerides 55 mg/dL      HDL Cholesterol 58 mg/dL      LDL Cholesterol  66 mg/dL      VLDL Cholesterol 11 mg/dL      LDL/HDL Ratio 1.14    Narrative:       Cholesterol Reference Ranges  (U.S. Department of Health and Human Services ATP III Classifications)    Desirable          <200 mg/dL  Borderline High    200-239 mg/dL  High Risk          >240  mg/dL      Triglyceride Reference Ranges  (U.S. Department of Health and Human Services ATP III Classifications)    Normal           <150 mg/dL  Borderline High  150-199 mg/dL  High             200-499 mg/dL  Very High        >500 mg/dL    HDL Reference Ranges  (U.S. Department of Health and Human Services ATP III Classifcations)    Low     <40 mg/dl (major risk factor for CHD)  High    >60 mg/dl ('negative' risk factor for CHD)        LDL Reference Ranges  (U.S. Department of Health and Human Services ATP III Classifcations)    Optimal          <100 mg/dL  Near Optimal     100-129 mg/dL  Borderline High  130-159 mg/dL  High             160-189 mg/dL  Very High        >189 mg/dL    Hemoglobin A1c [[id number]]  (Normal) Collected:  08/08/18 0656    Specimen:  Blood Updated:  08/08/18 0730     Hemoglobin A1C 4.90 %     Narrative:       Hemoglobin A1C Ranges:    Increased Risk for Diabetes  5.7% to 6.4%  Diabetes                     >= 6.5%  Diabetic Goal                < 7.0%    CBC & Differential [[id number]] Collected:  08/08/18 0656    Specimen:  Blood Updated:  08/08/18 0726    Narrative:       The following orders were created for panel order CBC & Differential.  Procedure                               Abnormality         Status                     ---------                               -----------         ------                     CBC Auto Differential[[id number]]        Abnormal            Final result                 Please view results for these tests on the individual orders.    CBC Auto Differential [[id number]]  (Abnormal) Collected:  08/08/18 0656    Specimen:  Blood Updated:  08/08/18 0726     WBC 10.65 10*3/mm3      RBC 4.49 (L) 10*6/mm3      Hemoglobin 13.5 (L) g/dL      Hematocrit 42.8 %      MCV 95.3 fL      MCH 30.1 pg      MCHC 31.5 (L) g/dL      RDW 12.9 %      RDW-SD 44.8 fl      MPV 10.5 fL      Platelets 159 10*3/mm3      Neutrophil % 84.0 (H) %      Lymphocyte % 8.1 (L) %      Monocyte % 7.6 %       Eosinophil % 0.0 (L) %      Basophil % 0.1 %      Immature Grans % 0.2 %      Neutrophils, Absolute 8.95 (H) 10*3/mm3      Lymphocytes, Absolute 0.86 (L) 10*3/mm3      Monocytes, Absolute 0.81 10*3/mm3      Eosinophils, Absolute 0.00 10*3/mm3      Basophils, Absolute 0.01 10*3/mm3      Immature Grans, Absolute 0.02 10*3/mm3         Imaging Results (last 24 hours)     Procedure Component Value Units Date/Time    XR Hip With or Without Pelvis 2 - 3 View Right [[id number]] Collected:  08/07/18 2144     Updated:  08/08/18 0637    Narrative:       TWO VIEW PORTABLE RIGHT HIP     HISTORY: Internal fixation of fracture.     FINDINGS: Imaging in the operating room shows placement of 3 pins for  fixation of a femoral neck fracture and the alignment appears  satisfactory. Two images were obtained and the fluoroscopy time measures  91 seconds.     This report was finalized on 8/8/2018 6:34 AM by Dr. Jason Valencia M.D.       FL C Arm During Surgery [[id number]] Resulted:  08/07/18 1917     Updated:  08/07/18 1917    Narrative:       This procedure was auto-finalized with no dictation required.      EKG  Rhythm/Rate: Kevin, 55  PVCs  No Acute Ischemia  Non-Specific ST-T changes  Unchanged compared to prior on 7/22/2018.      Current Facility-Administered Medications:   •  aspirin chewable tablet 81 mg, 81 mg, Oral, Daily, Santosh Avery MD, 81 mg at 08/08/18 0822  •  atorvastatin (LIPITOR) tablet 10 mg, 10 mg, Oral, Daily, Santosh Avery MD, 10 mg at 08/08/18 0822  •  buPROPion XL (WELLBUTRIN XL) 24 hr tablet 150 mg, 150 mg, Oral, QAM, Santosh Avery MD, 150 mg at 08/08/18 0822  •  ceFAZolin in dextrose (ANCEF) IVPB solution 2 g, 2 g, Intravenous, Q8H, Saqib Castle Jr., MD, 2 g at 08/08/18 1121  •  citalopram (CeleXA) tablet 20 mg, 20 mg, Oral, Daily, Santosh Avery MD, 20 mg at 08/08/18 0822  •  donepezil (ARICEPT) tablet 5 mg, 5 mg, Oral, Nightly, Santosh Avery MD  •  HYDROcodone-acetaminophen (NORCO) 5-325 MG per tablet 1  tablet, 1 tablet, Oral, Q6H PRN, Saqib Castle Jr., MD  •  lactated ringers infusion, 9 mL/hr, Intravenous, Continuous, Gera Canas MD, Last Rate: 9 mL/hr at 08/07/18 1708, 9 mL/hr at 08/07/18 1708  •  LORazepam (ATIVAN) injection 0.5 mg, 0.5 mg, Intravenous, Q4H PRN, Eb Avery MD, 0.5 mg at 08/08/18 0104  •  morphine injection 1 mg, 1 mg, Intravenous, Q4H PRN, 1 mg at 08/08/18 0104 **OR** morphine injection 2 mg, 2 mg, Intravenous, Q4H PRN, Eb Avery MD  •  ondansetron (ZOFRAN) injection 4 mg, 4 mg, Intravenous, Q6H PRN, Eb Avery MD, 4 mg at 08/08/18 0823  •  pantoprazole (PROTONIX) EC tablet 40 mg, 40 mg, Oral, Q AM, Eb Avery MD, 40 mg at 08/08/18 0822  •  rivaroxaban (XARELTO) tablet 20 mg, 20 mg, Oral, Daily With Dinner, Saqib Castle Jr., MD  •  Insert peripheral IV, , , Once **AND** sodium chloride 0.9 % flush 10 mL, 10 mL, Intravenous, PRN, Carloz Jose MD  •  sodium chloride 0.9 % with KCl 20 mEq/L infusion, 75 mL/hr, Intravenous, Continuous, Eb Avery MD, Stopped at 08/07/18 1630  •  traZODone (DESYREL) tablet 25 mg, 25 mg, Oral, Nightly, Eb Avery MD  •  vitamin B-12 (CYANOCOBALAMIN) tablet 1,000 mcg, 1,000 mcg, Oral, Daily, Eb Avery MD, 1,000 mcg at 08/08/18 0822    ASSESSMENT  Acute right femoral neck fracture status post closed reduction and pinning  Severe bradycardia with pauses  Syncopal episode probably vasovagal   Dehydration  Dementia  Depression  History of DVT on Xarelto  Hyperlipidemia  Osteoarthritis    PLAN  CPM  Postop care  Continue to monitor rhythm  Pain management  Supportive care  Xarelto resumed  Stress ulcer DVT prophylaxis  PT/OT  Follow closely further recommendation according to hospital course    EB AVERY MD

## 2018-08-08 NOTE — ANESTHESIA POSTPROCEDURE EVALUATION
Patient: Harsha Mejia    Procedure Summary     Date:  08/07/18 Room / Location:  [facility] OR [address] [place] [address] MAIN OR    Anesthesia Start:  1802 Anesthesia Stop:  1914    Procedure:  CLOSED REDUCTION AND PINNING RIGHT HIP FRACTURE (Right Hip) Diagnosis:       Closed displaced fracture of right femoral neck (CMS/HCC)      (Closed displaced fracture of right femoral neck (CMS/HCC) [S72.001A])    Surgeon:  Saqib Castle Jr., MD Provider:  Gera Canas MD    Anesthesia Type:  general ASA Status:  3          Anesthesia Type: general  Last vitals  BP   154/86 (08/07/18 1945)   Temp   36.8 °C (98.2 °F) (08/07/18 1945)   Pulse   67 (08/07/18 1945)   Resp   20 (08/07/18 1945)     SpO2   100 % (08/07/18 1945)     Post Anesthesia Care and Evaluation    Patient location during evaluation: PACU  Patient participation: complete - patient participated  Level of consciousness: awake  Pain score: 2  Pain management: adequate  Airway patency: patent  Anesthetic complications: No anesthetic complications  PONV Status: none  Cardiovascular status: acceptable  Respiratory status: acceptable  Hydration status: acceptable

## 2018-08-08 NOTE — PLAN OF CARE
Problem: Patient Care Overview  Goal: Plan of Care Review  Outcome: Ongoing (interventions implemented as appropriate)   08/08/18 0318   Coping/Psychosocial   Plan of Care Reviewed With patient   Plan of Care Review   Progress no change   OTHER   Outcome Summary Pt put in soft wrist and soft Right leg restraint for safety, PRN ativan and morphine given, 1x dose of haldol due to increase agitation. Dressing to R leg is C/D/I. Will continue to monitor. at 0320 8/8/18       Problem: Fall Risk (Adult)  Goal: Absence of Fall  Outcome: Ongoing (interventions implemented as appropriate)      Problem: Skin Injury Risk (Adult)  Goal: Skin Health and Integrity  Outcome: Ongoing (interventions implemented as appropriate)      Problem: Pain, Acute (Adult)  Goal: Acceptable Pain Control/Comfort Level  Outcome: Ongoing (interventions implemented as appropriate)

## 2018-08-08 NOTE — PROGRESS NOTES
"Orthopaedic Surgery  Daily Progress Note    /75 (BP Location: Left arm, Patient Position: Lying)   Pulse 64   Temp 99.1 °F (37.3 °C) (Oral)   Resp 16   Ht 188 cm (74\")   Wt 84.9 kg (187 lb 3.2 oz)   SpO2 95%   BMI 24.04 kg/m²             Patient Care Team:  Provider, No Known as PCP - General    SUBJECTIVE  Patient has become more confused, in restraints    PHYSICAL EXAM  Resting in NAD  Dressing clean, dry, intact  Toes warm, perfused, brisk capillary refill  Flexes/extends toes, ADF/APF  Reports sensation intact over foot       Active Problems:    Closed displaced fracture of right femoral neck (CMS/HCC)      PLAN / DISPOSITION:  POD 1 s/p CRPP right hip    1. Pain control: Minimize sedating meds  2.  Antibiotics: Periop Ancef complete today  3.  PT: WBAT, no restrictions  4. DVT: Xarelto resumed, plus GABBI/SCDs, mobilization  5. Dispo: pending, ok for discharge from orthopaedic standpoint, follow up 2-3 weeks with me at [facility] ([phone] to make appointment)    Saqib Castle Jr, MD  08/08/18  7:13 PM  "

## 2018-08-08 NOTE — NURSING NOTE
Pt came back up from surgery extremely agitated and confused, family at bedside. Family came out and got nurse and stated that he was trying to pull out his IV, take off his dressing from surgery and get out of bed. Nurse called MD and he gave order for 0.5mg of IV ativan Q4 PRN at 2108. Gave the ativan and he seemed to rest, at 2210 family came back out and stated that he was doing the same thing and throwing his legs out of the bed and pull the R leg (surgery site) up to his chest and wouldn't stay in the bed. Called MD and he gave order for 1x dose of IV haldol and soft wrist and one soft leg restraint, pt is still very agitated and confused. Family is at bedside and nurse will continue to monitor. At 2343 8/7/18

## 2018-08-08 NOTE — THERAPY EVALUATION
Acute Care - Physical Therapy Initial Evaluation  [facility]     Patient Name: Harsha Mejia  : 1945  MRN: [id number]  Today's Date: 2018   Onset of Illness/Injury or Date of Surgery: 18  Date of Referral to PT: 18         Admit Date: 2018    Visit Dx:     ICD-10-CM ICD-9-CM   1. Closed nondisplaced fracture of right femoral neck (CMS/[facility]) S72.001A 820.8   2. Dementia with behavioral disturbance, unspecified dementia type F03.91 294.21   3. Sinus pause I45.5 426.6   4. Difficulty walking R26.2 719.7     Patient Active Problem List   Diagnosis   • Closed displaced fracture of right femoral neck (CMS/[facility])     Past Medical History:   Diagnosis Date   • Arthritis     osteoarthritis   • Dementia    • DVT (deep venous thrombosis) (CMS/[facility])    • Hyperlipidemia      Past Surgical History:   Procedure Laterality Date   • HIP PERCUTANEOUS PINNING Right 2018    Procedure: CLOSED REDUCTION AND PINNING RIGHT HIP FRACTURE;  Surgeon: Saqib Castle Jr., MD;  Location: [facility] OR;  Service: Orthopedics        PT ASSESSMENT (last 12 hours)      Physical Therapy Evaluation     Row Name 18 1305          PT Evaluation Time/Intention    Subjective Information complains of;fatigue  -EF     Document Type evaluation  -EF     Mode of Treatment physical therapy  -EF     Patient Effort good  -EF     Symptoms Noted During/After Treatment fatigue  -EF     Row Name 18 1301          General Information    Patient Profile Reviewed? yes  -EF     Onset of Illness/Injury or Date of Surgery 18  -EF     Patient Observations alert;cooperative;agree to therapy  -EF     General Observations of Patient supine in bed, B wrist restraints, R ankle restraint  -EF     Prior Level of Function --   unsure-lived on Memory Care unit of assisted living  -EF     Pertinent History of Current Functional Problem s/p fall, R femur fx, s/p pinning  -EF     Existing Precautions/Restrictions fall   WBAT R LE  -EF      Barriers to Rehab cognitive status  -EF     Row Name 08/08/18 1305          Cognitive Assessment/Intervention- PT/OT    Orientation Status (Cognition) oriented to;person  -EF     Follows Commands (Cognition) follows one step commands;75-90% accuracy;physical/tactile prompts required  -EF     Safety Deficit (Cognitive) awareness of need for assistance;insight into deficits/self awareness;judgment;problem solving;safety precautions awareness  -EF     Row Name 08/08/18 1305          Bed Mobility Assessment/Treatment    Bed Mobility Assessment/Treatment supine-sit;sit-supine  -EF     Supine-Sit [place] (Bed Mobility) verbal cues;nonverbal cues (demo/gesture);moderate assist (50% patient effort);minimum assist (75% patient effort)  -EF     Sit-Supine [place] (Bed Mobility) verbal cues;nonverbal cues (demo/gesture);moderate assist (50% patient effort);2 person assist  -EF     Bed Mobility, Safety Issues cognitive deficits limit understanding;decreased use of legs for bridging/pushing  -EF     Assistive Device (Bed Mobility) bed rails  -EF     Row Name 08/08/18 1305          Transfer Assessment/Treatment    Transfer Assessment/Treatment sit-stand transfer;stand-sit transfer  -EF     Sit-Stand [place] (Transfers) verbal cues;nonverbal cues (demo/gesture);moderate assist (50% patient effort);2 person assist  -EF     Stand-Sit [place] (Transfers) verbal cues;nonverbal cues (demo/gesture);moderate assist (50% patient effort);2 person assist  -EF     Row Name 08/08/18 1305          Sit-Stand Transfer    Assistive Device (Sit-Stand Transfers) other (see comments)   A-2  -EF     Row Name 08/08/18 1305          Stand-Sit Transfer    Assistive Device (Stand-Sit Transfers) other (see comments)   A-2  -EF     Row Name 08/08/18 1305          Gait/Stairs Assessment/Training    [place] Level (Gait) moderate assist (50% patient effort);maximum assist (25% patient effort);2 person assist  -EF     Assistive  Device (Gait) other (see comments)   HHA-2  -EF     Distance in Feet (Gait) several small sidesteps  -EF     Deviations/Abnormal Patterns (Gait) antalgic;gait speed decreased;stride length decreased  -EF     Bilateral Gait Deviations forward flexed posture;weight shift ability decreased  -EF     Row Name 08/08/18 1305          General ROM    GENERAL ROM COMMENTS WFL except R LE ~50% AAROM  -EF     Row Name 08/08/18 1305          General Assessment (Manual Muscle Testing)    Comment, General Manual Muscle Testing (MMT) Assessment grossly functional throughout except R LE ~2+/5  -EF     Row Name 08/08/18 1305          Motor Assessment/Intervention    Additional Documentation Therapeutic Exercise (Group);Therapeutic Exercise Interventions (Group)  -EF     Row Name 08/08/18 1305          Therapeutic Exercise    Comment (Therapeutic Exercise) ORIF R ex protocol x 10 reps with assist  -EF     Row Name 08/08/18 1305          Pain Assessment    Additional Documentation Pain Scale: Word Pre/Post-Treatment (Group)  -EF     Row Name 08/08/18 1305          Pain Scale: Numbers Pre/Post-Treatment    Pain Location - Side Right  -EF     Pain Location hip  -EF     Pain Intervention(s) Medication (See MAR);Repositioned;Ambulation/increased activity  -EF     Row Name 08/08/18 1305          Pain Scale: Word Pre/Post-Treatment    Pain: Word Scale, Pretreatment 2 - mild pain  -EF     Pain: Word Scale, Post-Treatment 2 - mild pain  -EF     Row Name             Wound 08/07/18 1848 Right hip incision    Wound - Properties Group Date first assessed: 08/07/18  -SH Time first assessed: 1848  -SH Side: Right  -SH Location: hip  -SH Type: incision  -SH    Row Name 08/08/18 1305          Physical Therapy Clinical Impression    Date of Referral to PT 08/08/18  -EF     Criteria for Skilled Interventions Met (PT Clinical Impression) yes;treatment indicated  -EF     Rehab Potential (PT Clinical Summary) good, to achieve stated therapy goals  -EF      Row Name 08/08/18 1305          Physical Therapy Goals    Bed Mobility Goal Selection (PT) bed mobility, PT goal 1  -EF     Transfer Goal Selection (PT) transfer, PT goal 1  -EF     Gait Training Goal Selection (PT) gait training, PT goal 1  -EF     Row Name 08/08/18 1305          Bed Mobility Goal 1 (PT)    Activity/Assistive Device (Bed Mobility Goal 1, PT) bed mobility activities, all  -EF     [place] Level/Cues Needed (Bed Mobility Goal 1, PT) minimum assist (75% or more patient effort)  -EF     Time Frame (Bed Mobility Goal 1, PT) 2 weeks  -EF     Progress/Outcomes (Bed Mobility Goal 1, PT) goal ongoing  -EF     Row Name 08/08/18 1305          Transfer Goal 1 (PT)    Activity/Assistive Device (Transfer Goal 1, PT) transfers, all;walker, rolling  -EF     [place] Level/Cues Needed (Transfer Goal 1, PT) minimum assist (75% or more patient effort)   of  2 people  -EF     Time Frame (Transfer Goal 1, PT) 2 weeks  -EF     Progress/Outcome (Transfer Goal 1, PT) goal ongoing  -EF     Row Name 08/08/18 1305          Gait Training Goal 1 (PT)    Activity/Assistive Device (Gait Training Goal 1, PT) gait (walking locomotion);assistive device use;walker, rolling  -EF     [place] Level (Gait Training Goal 1, PT) minimum assist (75% or more patient effort)   of 2 people  -EF     Distance (Gait Goal 1, PT) 30  -EF     Time Frame (Gait Training Goal 1, PT) 2 weeks  -EF     Progress/Outcome (Gait Training Goal 1, PT) goal ongoing  -EF     Row Name 08/08/18 1306          Positioning and Restraints    Pre-Treatment Position in bed  -EF     Post Treatment Position bed  -EF     In Bed supine;call light within reach;encouraged to call for assist;exit alarm on  -EF     Restraints reapplied:;soft limb   B wrist, R ankle  -EF       User Key  (r) = Recorded By, (t) = Taken By, (c) = Cosigned By    Initials Name Provider Type    EF Nicole Islas, PT Physical Therapist    Mira Rao, RN Registered Nurse           Physical Therapy Education     Title: PT OT SLP Therapies (Active)     Topic: Physical Therapy (Active)     Point: Mobility training (Active)    Learning Progress Summary     Learner Status Readiness Method Response Comment Documented by    Patient Active Acceptance E NR   08/08/18 1315          Point: Home exercise program (Active)    Learning Progress Summary     Learner Status Readiness Method Response Comment Documented by    Patient Active Acceptance E NR  EF 08/08/18 1315          Point: Body mechanics (Active)    Learning Progress Summary     Learner Status Readiness Method Response Comment Documented by    Patient Active Acceptance E NR  EF 08/08/18 1315          Point: Precautions (Active)    Learning Progress Summary     Learner Status Readiness Method Response Comment Documented by    Patient Active Acceptance E NR  EF 08/08/18 1315                      User Key     Initials Effective Dates Name Provider Type Discipline     06/08/18 -  Nicole Islas PT Physical Therapist PT                PT Recommendation and Plan  Anticipated Discharge Disposition (PT): skilled nursing facility  Therapy Frequency (PT Clinical Impression): daily  Outcome Summary/Treatment Plan (PT)  Anticipated Discharge Disposition (PT): skilled nursing facility  Plan of Care Reviewed With: patient  Outcome Summary: Pt presents with impaired bed mob, txfs, gait, and ballance following fall, R femur fx, and pinning surgery. He was pleasant and cooperative during therapy but confused.  He will benefit from skilled PT to address deficits to facilitate recovry and improved function.          Outcome Measures     Row Name 08/08/18 1300             How much help from another person do you currently need...    Turning from your back to your side while in flat bed without using bedrails? 3  -EF      Moving from lying on back to sitting on the side of a flat bed without bedrails? 3  -EF      Moving to and from a bed to a chair  (including a wheelchair)? 2  -EF      Standing up from a chair using your arms (e.g., wheelchair, bedside chair)? 2  -EF      Climbing 3-5 steps with a railing? 1  -EF      To walk in hospital room? 1  -EF      AM-PAC 6 Clicks Score 12  -EF         Functional Assessment    Outcome Measure Options AM-PAC 6 Clicks Basic Mobility (PT)  -EF        User Key  (r) = Recorded By, (t) = Taken By, (c) = Cosigned By    Initials Name Provider Type    Nicole Jaramillo PT Physical Therapist           Time Calculation:         PT Charges     Row Name 08/08/18 1317             Time Calculation    Start Time 1140  -EF      Stop Time 1200  -EF      Time Calculation (min) 20 min  -EF      PT Received On 08/08/18  -EF      PT - Next Appointment 08/09/18  -EF      PT Goal Re-Cert Due Date 08/22/18  -EF        User Key  (r) = Recorded By, (t) = Taken By, (c) = Cosigned By    Initials Name Provider Type    Nicole Jaramillo PT Physical Therapist        Therapy Suggested Charges     Code   Minutes Charges    None           Therapy Charges for Today     Code Description Service Date Service Provider Modifiers Qty    49344916937 HC PT EVAL MOD COMPLEXITY 2 8/8/2018 Nicole Islas, PT GP 1    96252612405 HC PT THER PROC EA 15 MIN 8/8/2018 Nicole Islas, PT GP 1    73426085987 HC PT THER SUPP EA 15 MIN 8/8/2018 Nicole Islas, PT GP 1          PT G-Codes  Outcome Measure Options: AM-PAC 6 Clicks Basic Mobility (PT)      Nicole Islas PT  8/8/2018

## 2018-08-09 LAB
ANION GAP SERPL CALCULATED.3IONS-SCNC: 12 MMOL/L
BASOPHILS # BLD AUTO: 0.02 10*3/MM3 (ref 0–0.2)
BASOPHILS NFR BLD AUTO: 0.2 % (ref 0–1.5)
BUN BLD-MCNC: 14 MG/DL (ref 8–23)
BUN/CREAT SERPL: 12.4 (ref 7–25)
CALCIUM SPEC-SCNC: 8.5 MG/DL (ref 8.6–10.5)
CHLORIDE SERPL-SCNC: 103 MMOL/L (ref 98–107)
CO2 SERPL-SCNC: 26 MMOL/L (ref 22–29)
CREAT BLD-MCNC: 1.13 MG/DL (ref 0.76–1.27)
DEPRECATED RDW RBC AUTO: 45.7 FL (ref 37–54)
EOSINOPHIL # BLD AUTO: 0.33 10*3/MM3 (ref 0–0.7)
EOSINOPHIL NFR BLD AUTO: 4.1 % (ref 0.3–6.2)
ERYTHROCYTE [DISTWIDTH] IN BLOOD BY AUTOMATED COUNT: 13.1 % (ref 11.5–14.5)
GFR SERPL CREATININE-BSD FRML MDRD: 63 ML/MIN/1.73
GLUCOSE BLD-MCNC: 94 MG/DL (ref 65–99)
HCT VFR BLD AUTO: 40.8 % (ref 40.4–52.2)
HGB BLD-MCNC: 12.8 G/DL (ref 13.7–17.6)
IMM GRANULOCYTES # BLD: 0 10*3/MM3 (ref 0–0.03)
IMM GRANULOCYTES NFR BLD: 0 % (ref 0–0.5)
LYMPHOCYTES # BLD AUTO: 1.53 10*3/MM3 (ref 0.9–4.8)
LYMPHOCYTES NFR BLD AUTO: 19 % (ref 19.6–45.3)
MCH RBC QN AUTO: 30 PG (ref 27–32.7)
MCHC RBC AUTO-ENTMCNC: 31.4 G/DL (ref 32.6–36.4)
MCV RBC AUTO: 95.8 FL (ref 79.8–96.2)
MONOCYTES # BLD AUTO: 0.75 10*3/MM3 (ref 0.2–1.2)
MONOCYTES NFR BLD AUTO: 9.3 % (ref 5–12)
NEUTROPHILS # BLD AUTO: 5.41 10*3/MM3 (ref 1.9–8.1)
NEUTROPHILS NFR BLD AUTO: 67.4 % (ref 42.7–76)
PLATELET # BLD AUTO: 152 10*3/MM3 (ref 140–500)
PMV BLD AUTO: 10.6 FL (ref 6–12)
POTASSIUM BLD-SCNC: 4.3 MMOL/L (ref 3.5–5.2)
RBC # BLD AUTO: 4.26 10*6/MM3 (ref 4.6–6)
SODIUM BLD-SCNC: 141 MMOL/L (ref 136–145)
WBC NRBC COR # BLD: 8.04 10*3/MM3 (ref 4.5–10.7)

## 2018-08-09 PROCEDURE — 85025 COMPLETE CBC W/AUTO DIFF WBC: CPT | Performed by: HOSPITALIST

## 2018-08-09 PROCEDURE — 99232 SBSQ HOSP IP/OBS MODERATE 35: CPT | Performed by: INTERNAL MEDICINE

## 2018-08-09 PROCEDURE — 25010000002 MORPHINE PER 10 MG: Performed by: HOSPITALIST

## 2018-08-09 PROCEDURE — 25810000003 SODIUM CHLORIDE 0.9 % WITH KCL 20 MEQ 20-0.9 MEQ/L-% SOLUTION: Performed by: HOSPITALIST

## 2018-08-09 PROCEDURE — 25010000002 LORAZEPAM PER 2 MG: Performed by: HOSPITALIST

## 2018-08-09 PROCEDURE — 80048 BASIC METABOLIC PNL TOTAL CA: CPT | Performed by: HOSPITALIST

## 2018-08-09 RX ADMIN — MORPHINE SULFATE 2 MG: 2 INJECTION, SOLUTION INTRAMUSCULAR; INTRAVENOUS at 01:41

## 2018-08-09 RX ADMIN — CITALOPRAM 20 MG: 20 TABLET, FILM COATED ORAL at 13:23

## 2018-08-09 RX ADMIN — RIVAROXABAN 20 MG: 20 TABLET, FILM COATED ORAL at 22:04

## 2018-08-09 RX ADMIN — POTASSIUM CHLORIDE AND SODIUM CHLORIDE 75 ML/HR: 900; 150 INJECTION, SOLUTION INTRAVENOUS at 09:33

## 2018-08-09 RX ADMIN — TRAZODONE HYDROCHLORIDE 25 MG: 50 TABLET, FILM COATED ORAL at 22:18

## 2018-08-09 RX ADMIN — PANTOPRAZOLE SODIUM 40 MG: 40 TABLET, DELAYED RELEASE ORAL at 05:31

## 2018-08-09 RX ADMIN — Medication 1000 MCG: at 13:23

## 2018-08-09 RX ADMIN — LORAZEPAM 0.5 MG: 2 INJECTION INTRAMUSCULAR; INTRAVENOUS at 04:18

## 2018-08-09 RX ADMIN — LORAZEPAM 0.5 MG: 2 INJECTION INTRAMUSCULAR; INTRAVENOUS at 18:23

## 2018-08-09 RX ADMIN — DONEPEZIL HYDROCHLORIDE 5 MG: 5 TABLET, FILM COATED ORAL at 22:04

## 2018-08-09 RX ADMIN — ATORVASTATIN CALCIUM 10 MG: 10 TABLET, FILM COATED ORAL at 13:23

## 2018-08-09 RX ADMIN — ASPIRIN 81 MG: 81 TABLET, CHEWABLE ORAL at 13:23

## 2018-08-09 RX ADMIN — BUPROPION HYDROCHLORIDE 150 MG: 150 TABLET, EXTENDED RELEASE ORAL at 05:33

## 2018-08-09 RX ADMIN — HYDROCODONE BITARTRATE AND ACETAMINOPHEN 1 TABLET: 5; 325 TABLET ORAL at 04:18

## 2018-08-09 NOTE — SIGNIFICANT NOTE
08/09/18 1111   Rehab Time/Intention   Evaluation Not Performed other (see comments)  (Pt not appropriate for therapy this morning; pt asleep and not arousable enough for therapeutic participation. RN aware. OT to check back in PM if time allows.)   Rehab Treatment   Discipline occupational therapist   Recommendation   OT - Next Appointment 08/10/18

## 2018-08-09 NOTE — PROGRESS NOTES
Hospital Follow Up      Chief Complaint: Follow up syncope, bradycardia    Interval History: Patient still with intermittent agitation and trying to get out of bed when awake.  Still in restraints.  Currently asleep. Reportedly has been sleeping for most of the day.     Objective:     Objective:  Temp:  [97.7 °F (36.5 °C)-98.9 °F (37.2 °C)] 97.7 °F (36.5 °C)  Heart Rate:  [50-74] 50  Resp:  [18] 18  BP: (115-133)/(56-80) 133/80     Intake/Output Summary (Last 24 hours) at 08/09/18 1558  Last data filed at 08/09/18 1355   Gross per 24 hour   Intake              990 ml   Output              680 ml   Net              310 ml     Body mass index is 24.04 kg/m².  1    08/06/18  1658   Weight: 84.9 kg (187 lb 3.2 oz)     Weight change:       Physical Exam:   General : Alert, cooperative, in no acute distress.  Neuro: alert,cooperative and oriented  Lungs: CTAB. Normal respiratory effort and rate.  CV:: Regular rate and rhythm, normal S1 and S2, no murmurs, gallops or rubs.  ABD: Soft, nontender, non-distended. positive bowel sounds  Extr: No edema or cyanosis, moves all extremities    Lab Review:     Results from last 7 days  Lab Units 08/09/18  0458 08/08/18  0656  08/06/18  1802   SODIUM mmol/L 141 140  < > 143   POTASSIUM mmol/L 4.3 4.1  < > 4.1   CHLORIDE mmol/L 103 101  < > 105   CO2 mmol/L 26.0 23.9  < > 24.1   BUN mg/dL 14 18  < > 19   CREATININE mg/dL 1.13 1.26  < > 1.30*   GLUCOSE mg/dL 94 127*  < > 89   CALCIUM mg/dL 8.5* 8.6  < > 8.4*   AST (SGOT) U/L  --  16  --  14   ALT (SGPT) U/L  --  10  --  11   < > = values in this interval not displayed.    Results from last 7 days  Lab Units 08/06/18  1930 08/06/18  1802   TROPONIN T ng/mL <0.010 <0.010       Results from last 7 days  Lab Units 08/09/18  0458 08/08/18  0656   WBC 10*3/mm3 8.04 10.65   HEMOGLOBIN g/dL 12.8* 13.5*   HEMATOCRIT % 40.8 42.8   PLATELETS 10*3/mm3 152 159       Results from last 7 days  Lab Units 08/06/18  1802   INR  1.56*            Results from last 7 days  Lab Units 08/08/18  0656   CHOLESTEROL mg/dL 135   TRIGLYCERIDES mg/dL 55   HDL CHOL mg/dL 58       Results from last 7 days  Lab Units 08/08/18  0656   PROBNP pg/mL 670.7       Results from last 7 days  Lab Units 08/08/18  0656 08/07/18  0634   TSH mIU/mL 1.380 1.890     I reviewed the patient's new clinical results.  I personally viewed and interpreted the patient's EKG  Current Medications:   Scheduled Meds:  aspirin 81 mg Oral Daily   atorvastatin 10 mg Oral Daily   buPROPion  mg Oral QAM   citalopram 20 mg Oral Daily   donepezil 5 mg Oral Nightly   pantoprazole 40 mg Oral Q AM   rivaroxaban 20 mg Oral Daily With Dinner   traZODone 25 mg Oral Nightly   vitamin B-12 1,000 mcg Oral Daily     Continuous Infusions:  sodium chloride 0.9 % with KCl 20 mEq 75 mL/hr Last Rate: 75 mL/hr (08/09/18 0933)       Allergies:  No Known Allergies    Assessment/Plan:     1. Syncope.  Appears to be vasovagal syncope.  No further episodes since admission.    2. Right femoral neck fracture.  Status post closed reduction and pinning.  POD #2.   3. DVT.  On therapy with rivaroxaban which was resumed following surgery.   4. Hyperlipidemia  5. Dementia     -  No further recommendations at this time.  Will see again as needed.    Teresita Metz MD  08/09/18  3:58 PM

## 2018-08-09 NOTE — PROGRESS NOTES
"Daily progress note    Chief complaint  Doing same  Remain agitated and confused  No specific complaints    History of present illness  73-year-old white male with history of severe dementia hyperlipidemia depression and osteoarthritis presented to [facility] emergency room after sustaining a fall with right hip pain and right leg pain.  Patient does not remember what happened.  Patient is on Xarelto for chronic DVT.  Patient workup in ER revealed right femoral neck fracture with severe bradycardia and pauses admitted for management.  At the time of interview he is alert and oriented onset all questions appropriately denies any chest pain shortness of breath dizziness lightheadedness palpitation but does not recall what happened how he fell.     REVIEW OF SYSTEMS  Review of Systems   Unable to perform ROS: Dementia   Musculoskeletal: Positive for arthralgias (right hip pain) and myalgias (right leg pain).      PHYSICAL EXAM  Blood pressure 133/80, pulse 50, temperature 97.7 °F (36.5 °C), temperature source Oral, resp. rate 18, height 188 cm (74\"), weight 84.9 kg (187 lb 3.2 oz), SpO2 98 %.    Constitutional: He is oriented to person, place, and time. No distress.   Pt is confused (which is baseline), pleasantly demented.    Head: Normocephalic and atraumatic.   Mouth/Throat: Oropharynx is clear and moist.   Eyes: Pupils are equal, round, and reactive to light. EOM are normal.   Neck: Normal range of motion. Neck supple.   Cardiovascular: Normal rate, regular rhythm and normal heart sounds.    Pulmonary/Chest: Effort normal and breath sounds normal. No respiratory distress.   Abdominal: Soft. There is no tenderness. There is no rebound and no guarding.   Musculoskeletal: Normal range of motion. He exhibits edema (pedal edema on RLE from DVT) and tenderness (right hip tenderness,  distal right femur tenderness, but no C-spine tenderness).   Neurological: He is alert and oriented to person, place, and time. He " has normal sensation and normal strength.   Skin: Skin is warm and dry.   Psychiatric: Mood and affect normal.     LABS:  Lab Results (last 24 hours)     Procedure Component Value Units Date/Time    Basic Metabolic Panel [[id number]]  (Abnormal) Collected:  08/09/18 0458    Specimen:  Blood Updated:  08/09/18 0558     Glucose 94 mg/dL      BUN 14 mg/dL      Creatinine 1.13 mg/dL      Sodium 141 mmol/L      Potassium 4.3 mmol/L      Chloride 103 mmol/L      CO2 26.0 mmol/L      Calcium 8.5 (L) mg/dL      eGFR Non African Amer 63 mL/min/1.73      BUN/Creatinine Ratio 12.4     Anion Gap 12.0 mmol/L     Narrative:       The MDRD GFR formula is only valid for adults with stable renal function between ages 18 and 70.    CBC & Differential [[id number]] Collected:  08/09/18 0458    Specimen:  Blood Updated:  08/09/18 0527    Narrative:       The following orders were created for panel order CBC & Differential.  Procedure                               Abnormality         Status                     ---------                               -----------         ------                     CBC Auto Differential[[id number]]        Abnormal            Final result                 Please view results for these tests on the individual orders.    CBC Auto Differential [[id number]]  (Abnormal) Collected:  08/09/18 0458    Specimen:  Blood Updated:  08/09/18 0527     WBC 8.04 10*3/mm3      RBC 4.26 (L) 10*6/mm3      Hemoglobin 12.8 (L) g/dL      Hematocrit 40.8 %      MCV 95.8 fL      MCH 30.0 pg      MCHC 31.4 (L) g/dL      RDW 13.1 %      RDW-SD 45.7 fl      MPV 10.6 fL      Platelets 152 10*3/mm3      Neutrophil % 67.4 %      Lymphocyte % 19.0 (L) %      Monocyte % 9.3 %      Eosinophil % 4.1 %      Basophil % 0.2 %      Immature Grans % 0.0 %      Neutrophils, Absolute 5.41 10*3/mm3      Lymphocytes, Absolute 1.53 10*3/mm3      Monocytes, Absolute 0.75 10*3/mm3      Eosinophils, Absolute 0.33 10*3/mm3      Basophils, Absolute 0.02  10*3/mm3      Immature Grans, Absolute 0.00 10*3/mm3         Imaging Results (last 24 hours)     Procedure Component Value Units Date/Time    XR Hip With or Without Pelvis 2 - 3 View Right [[id number]] Collected:  08/08/18 2046     Updated:  08/08/18 2051    Narrative:       XR HIP W OR WO PELVIS 2-3 VIEW RIGHT-     Clinical: Postoperative right hip pinning procedure     COMPARISON intraoperative C-arm images dated 8/ 7/20/2018     FINDINGS: There are 3 fixation pins demonstrated within the right femur  crossing the femoral neck fracture. Threaded and within the femoral  head. The alignment at the site of fracture is similar to the  intraoperative C-arm images. The right hemipelvis is satisfactory in  appearance. Surgical skin staples noted at the site of surgery. None of  the fixation pins demonstrates interval change in position within the  interim.     This report was finalized on 8/8/2018 8:48 PM by Dr. Jaskaran Mcfarlane M.D.         EKG  Rhythm/Rate: Kevin, 55  PVCs  No Acute Ischemia  Non-Specific ST-T changes  Unchanged compared to prior on 7/22/2018.      Current Facility-Administered Medications:   •  aspirin chewable tablet 81 mg, 81 mg, Oral, Daily, Santosh Avery MD, 81 mg at 08/09/18 1323  •  atorvastatin (LIPITOR) tablet 10 mg, 10 mg, Oral, Daily, Santosh Avery MD, 10 mg at 08/09/18 1323  •  buPROPion XL (WELLBUTRIN XL) 24 hr tablet 150 mg, 150 mg, Oral, QAM, Santosh Avery MD, 150 mg at 08/09/18 0533  •  citalopram (CeleXA) tablet 20 mg, 20 mg, Oral, Daily, Santosh Avery MD, 20 mg at 08/09/18 1323  •  donepezil (ARICEPT) tablet 5 mg, 5 mg, Oral, Nightly, Santosh Avery MD, 5 mg at 08/08/18 2153  •  HYDROcodone-acetaminophen (NORCO) 5-325 MG per tablet 1 tablet, 1 tablet, Oral, Q6H PRN, Saqib Castle Jr., MD, 1 tablet at 08/09/18 4325  •  lactated ringers infusion, 9 mL/hr, Intravenous, Continuous, Gera Canas MD, Last Rate: 9 mL/hr at 08/07/18 1708, 9 mL/hr at 08/07/18 1708  •  LORazepam (ATIVAN)  injection 0.5 mg, 0.5 mg, Intravenous, Q4H PRN, Eb Avery MD, 0.5 mg at 08/09/18 0418  •  morphine injection 1 mg, 1 mg, Intravenous, Q4H PRN, 1 mg at 08/08/18 0104 **OR** morphine injection 2 mg, 2 mg, Intravenous, Q4H PRN, Eb Avery MD, 2 mg at 08/09/18 0141  •  ondansetron (ZOFRAN) injection 4 mg, 4 mg, Intravenous, Q6H PRN, Eb Avery MD, 4 mg at 08/08/18 0823  •  pantoprazole (PROTONIX) EC tablet 40 mg, 40 mg, Oral, Q AM, Eb Avery MD, 40 mg at 08/09/18 0531  •  rivaroxaban (XARELTO) tablet 20 mg, 20 mg, Oral, Daily With Dinner, Saqib Castle Jr., MD, 20 mg at 08/08/18 1848  •  Insert peripheral IV, , , Once **AND** sodium chloride 0.9 % flush 10 mL, 10 mL, Intravenous, PRN, Carloz Jose MD  •  sodium chloride 0.9 % with KCl 20 mEq/L infusion, 75 mL/hr, Intravenous, Continuous, Eb Avery MD, Last Rate: 75 mL/hr at 08/09/18 0933, 75 mL/hr at 08/09/18 0933  •  traZODone (DESYREL) tablet 25 mg, 25 mg, Oral, Nightly, Eb Avery MD, 25 mg at 08/08/18 2153  •  vitamin B-12 (CYANOCOBALAMIN) tablet 1,000 mcg, 1,000 mcg, Oral, Daily, Eb Avery MD, 1,000 mcg at 08/09/18 1323    ASSESSMENT  Acute right femoral neck fracture status post closed reduction and pinning  Severe bradycardia with pauses  Syncopal episode probably vasovagal   Dehydration  Dementia  Depression  History of DVT on Xarelto  Hyperlipidemia  Osteoarthritis    PLAN  CPM  Postop care  Continue to monitor rhythm  Pain management  Supportive care  Xarelto   Stress ulcer DVT prophylaxis  PT/OT  Discharge back to nursing home once more stable medically and surgically    EB AVERY MD

## 2018-08-09 NOTE — PLAN OF CARE
Problem: Patient Care Overview  Goal: Plan of Care Review  Outcome: Ongoing (interventions implemented as appropriate)   08/09/18 0630   Coping/Psychosocial   Plan of Care Reviewed With patient   Plan of Care Review   Progress no change   OTHER   Outcome Summary Patient remain in restraints for pulling on tubes, and for prevention of self halm related to post surgery. Patient cognitively impairted. Medicated for pain and increased agitation. Dressing remain intacvt to right hip incison sdite with no drainage noted. Patient made numerous attempts to get out of restraint. Stat securtity had tto be called to help assist resident to bed. Hip x-ray done. Nursing will continue to monitor.      Goal: Individualization and Mutuality  Outcome: Ongoing (interventions implemented as appropriate)    Goal: Discharge Needs Assessment  Outcome: Ongoing (interventions implemented as appropriate)      Problem: Skin Injury Risk (Adult)  Goal: Skin Health and Integrity  Outcome: Ongoing (interventions implemented as appropriate)      Problem: Pain, Acute (Adult)  Goal: Acceptable Pain Control/Comfort Level  Outcome: Ongoing (interventions implemented as appropriate)      Problem: Restraint, Nonbehavioral (Nonviolent)  Goal: Rationale and Justification  Outcome: Ongoing (interventions implemented as appropriate)    Goal: Nonbehavioral (Nonviolent) Restraint: Absence of Injury/Harm  Outcome: Ongoing (interventions implemented as appropriate)    Goal: Nonbehavioral (Nonviolent) Restraint: Achievement of Discontinuation Criteria  Outcome: Ongoing (interventions implemented as appropriate)    Goal: Nonbehavioral (Nonviolent) Restraint: Preservation of Dignity and Wellbeing  Outcome: Ongoing (interventions implemented as appropriate)

## 2018-08-09 NOTE — SIGNIFICANT NOTE
08/09/18 1414   Rehab Treatment   Discipline physical therapist   Reason Treatment Not Performed other (see comments)  (Attempted to see pt, he is sleeping soundly.  Attempted to arouse, but pt unable to open his eyes or stay awake enough to participate with PT. Informed RN. Will follow up tomorrow.)   Recommendation   PT - Next Appointment 08/10/18

## 2018-08-10 LAB
ANION GAP SERPL CALCULATED.3IONS-SCNC: 9.7 MMOL/L
BUN BLD-MCNC: 10 MG/DL (ref 8–23)
BUN/CREAT SERPL: 11.9 (ref 7–25)
CALCIUM SPEC-SCNC: 8.1 MG/DL (ref 8.6–10.5)
CHLORIDE SERPL-SCNC: 105 MMOL/L (ref 98–107)
CO2 SERPL-SCNC: 27.3 MMOL/L (ref 22–29)
CREAT BLD-MCNC: 0.84 MG/DL (ref 0.76–1.27)
GFR SERPL CREATININE-BSD FRML MDRD: 89 ML/MIN/1.73
GLUCOSE BLD-MCNC: 85 MG/DL (ref 65–99)
POTASSIUM BLD-SCNC: 4 MMOL/L (ref 3.5–5.2)
SODIUM BLD-SCNC: 142 MMOL/L (ref 136–145)

## 2018-08-10 PROCEDURE — 25010000002 LORAZEPAM PER 2 MG: Performed by: HOSPITALIST

## 2018-08-10 PROCEDURE — 25810000003 SODIUM CHLORIDE 0.9 % WITH KCL 20 MEQ 20-0.9 MEQ/L-% SOLUTION: Performed by: HOSPITALIST

## 2018-08-10 PROCEDURE — 80048 BASIC METABOLIC PNL TOTAL CA: CPT | Performed by: HOSPITALIST

## 2018-08-10 PROCEDURE — 97166 OT EVAL MOD COMPLEX 45 MIN: CPT

## 2018-08-10 PROCEDURE — 97535 SELF CARE MNGMENT TRAINING: CPT

## 2018-08-10 PROCEDURE — 97110 THERAPEUTIC EXERCISES: CPT

## 2018-08-10 RX ADMIN — BUPROPION HYDROCHLORIDE 150 MG: 150 TABLET, EXTENDED RELEASE ORAL at 06:06

## 2018-08-10 RX ADMIN — Medication 1000 MCG: at 15:20

## 2018-08-10 RX ADMIN — CITALOPRAM 20 MG: 20 TABLET, FILM COATED ORAL at 15:21

## 2018-08-10 RX ADMIN — HYDROCODONE BITARTRATE AND ACETAMINOPHEN 1 TABLET: 5; 325 TABLET ORAL at 15:23

## 2018-08-10 RX ADMIN — LORAZEPAM 0.5 MG: 2 INJECTION INTRAMUSCULAR; INTRAVENOUS at 03:09

## 2018-08-10 RX ADMIN — HYDROCODONE BITARTRATE AND ACETAMINOPHEN 1 TABLET: 5; 325 TABLET ORAL at 23:03

## 2018-08-10 RX ADMIN — PANTOPRAZOLE SODIUM 40 MG: 40 TABLET, DELAYED RELEASE ORAL at 06:06

## 2018-08-10 RX ADMIN — POTASSIUM CHLORIDE AND SODIUM CHLORIDE 75 ML/HR: 900; 150 INJECTION, SOLUTION INTRAVENOUS at 02:20

## 2018-08-10 RX ADMIN — RIVAROXABAN 20 MG: 20 TABLET, FILM COATED ORAL at 19:08

## 2018-08-10 RX ADMIN — TRAZODONE HYDROCHLORIDE 25 MG: 50 TABLET, FILM COATED ORAL at 20:36

## 2018-08-10 RX ADMIN — ATORVASTATIN CALCIUM 10 MG: 10 TABLET, FILM COATED ORAL at 15:21

## 2018-08-10 RX ADMIN — ASPIRIN 81 MG: 81 TABLET, CHEWABLE ORAL at 15:21

## 2018-08-10 RX ADMIN — DONEPEZIL HYDROCHLORIDE 5 MG: 5 TABLET, FILM COATED ORAL at 20:36

## 2018-08-10 NOTE — DISCHARGE PLACEMENT REQUEST
"Pascual Mejia (73 y.o. Male)     Date of Birth Social Security Number Address Home Phone MRN    1945  [address] [place] [id number] [phone] [id number]    Protestant Marital Status          Rastafarian Single       Admission Date Admission Type Admitting Provider Attending Provider Department, Room/Bed    8/6/18 Emergency Santosh Avery MD Ahmed, Aftab, MD [facility], 653/1    Discharge Date Discharge Disposition Discharge Destination                       Attending Provider:  Santosh Avery MD    Allergies:  No Known Allergies    Isolation:  None   Infection:  None   Code Status:  No CPR    Ht:  188 cm (74\")   Wt:  79.1 kg (174 lb 6.4 oz)    Admission Cmt:  None   Principal Problem:  None                Active Insurance as of 8/6/2018     Primary Coverage     Payor Plan Insurance Group Employer/Plan Group    MEDICARE MEDICARE A & B      Payor Plan Address Payor Plan Phone Number Effective From Effective To    [address] [phone] 10/1/2008     [place] [id number]       Subscriber Name Subscriber Birth Date Member ID       PASCUAL MEJIA 1945 [id number]                 Emergency Contacts      (Rel.) Home Phone Work Phone Mobile Phone    Jackie(Guardian)Abdias (Son) -- -- [phone]    JackieEvelin (Daughter) -- -- [phone]            "

## 2018-08-10 NOTE — PLAN OF CARE
"Problem: Patient Care Overview  Goal: Plan of Care Review  Outcome: Ongoing (interventions implemented as appropriate)   08/10/18 1500   Coping/Psychosocial   Plan of Care Reviewed With patient   Plan of Care Review   Progress improving   OTHER   Outcome Summary Pt alert and cooperative today. Oriented to self only. I cannot believe I do not remember what happened\". Pt was able to amb 4' today with chair brought up to him due to fatigue.          "

## 2018-08-10 NOTE — THERAPY TREATMENT NOTE
Acute Care - Physical Therapy Treatment Note  [facility]     Patient Name: Harsha Mejia  : 1945  MRN: [id number]  Today's Date: 8/10/2018  Onset of Illness/Injury or Date of Surgery: 18  Date of Referral to PT: 18       Admit Date: 2018    Visit Dx:    ICD-10-CM ICD-9-CM   1. Closed nondisplaced fracture of right femoral neck (CMS/HCC) S72.001A 820.8   2. Dementia with behavioral disturbance, unspecified dementia type F03.91 294.21   3. Sinus pause I45.5 426.6   4. Difficulty walking R26.2 719.7     Patient Active Problem List   Diagnosis   • Closed displaced fracture of right femoral neck (CMS/HCC)       Therapy Treatment          Rehabilitation Treatment Summary     Row Name 08/10/18 1434             Treatment Time/Intention    Discipline physical therapist  -SV      Document Type therapy note (daily note)  -SV      Subjective Information no complaints  -SV      Mode of Treatment individual therapy  -SV      Patient/Family Observations none  -SV      Patient Effort good  -SV      Comment pt confused but very cooperative and following directions for ex, mobility  -SV      Treatment Considerations/Comments WBAT on RLE  -SV      Recorded by [SV] Estefanía Dumont, PT 08/10/18 1500      Row Name 08/10/18 1434             Vital Signs    Pre SpO2 (%) 95  -SV      O2 Delivery Pre Treatment supplemental O2  -SV      O2 Delivery Intra Treatment supplemental O2  -SV      Post SpO2 (%) 95  -SV      O2 Delivery Post Treatment supplemental O2  -SV      Pre Patient Position Supine  -SV      Intra Patient Position Standing  -SV      Post Patient Position Sitting  -SV      Recorded by [SV] Estefanía Dumont, PT 08/10/18 1500      Row Name 08/10/18 1434             Cognitive Assessment/Intervention- PT/OT    Orientation Status (Cognition) oriented to;person  -SV      Follows Commands (Cognition) follows one step commands;50-74% accuracy;physical/tactile prompts required;verbal cues/prompting required   -SV      Recorded by [SV] Estefanía Dumont, PT 08/10/18 1500      Row Name 08/10/18 1434             Bed Mobility Assessment/Treatment    Supine-Sit [place] (Bed Mobility) moderate assist (50% patient effort);2 person assist  -SV      Sit-Supine [place] (Bed Mobility) not tested  -SV      Recorded by [SV] Estefanía Dumont, PT 08/10/18 1500      Row Name 08/10/18 1434             Sit-Stand Transfer    Sit-Stand [place] (Transfers) moderate assist (50% patient effort);2 person assist  -SV      Assistive Device (Sit-Stand Transfers) walker, front-wheeled  -SV      Recorded by [SV] Estefanía Dumont, PT 08/10/18 1500      Row Name 08/10/18 1434             Stand-Sit Transfer    Stand-Sit [place] (Transfers) moderate assist (50% patient effort);minimum assist (75% patient effort);2 person assist;nonverbal cues (demo/gesture);verbal cues  -SV      Recorded by [SV] Estefanía Dumont, PT 08/10/18 1500      Row Name 08/10/18 1434             Gait/Stairs Assessment/Training    [place] Level (Gait) minimum assist (75% patient effort);2 person assist  -SV      Assistive Device (Gait) walker, front-wheeled  -SV      Distance in Feet (Gait) 4   flexed posture, very small steps  -SV      Recorded by [SV] Estefanía Dumont, PT 08/10/18 1500      Row Name 08/10/18 1434             Motor Skills Assessment/Interventions    Additional Documentation --   seated laq and ap 20 reps with verbal cues only  -SV      Recorded by [SV] Estefanía Dumont, PT 08/10/18 1500      Row Name 08/10/18 1434             Positioning and Restraints    Pre-Treatment Position in bed  -SV      Post Treatment Position chair  -SV      In Bed notified nsg;call light within reach;encouraged to call for assist;exit alarm on  -SV      Recorded by [SV] Estefanía Dumont, PT 08/10/18 1500      Row Name 08/10/18 1434             Pain Assessment    Additional Documentation --   reported with mobility, denies at rest  -SV      Recorded by [SV]  Estefanía Dumont, PT 08/10/18 1500      Row Name                Wound 08/07/18 1848 Right hip incision    Wound - Properties Group Date first assessed: 08/07/18 [SH] Time first assessed: 1848 [SH] Side: Right [SH] Location: hip [SH] Type: incision [SH] Recorded by:  [] Mira Phillips RN 08/07/18 1848      User Key  (r) = Recorded By, (t) = Taken By, (c) = Cosigned By    Initials Name Effective Dates Discipline     Mira Phillips, RN 10/13/16 -  Nurse    Estefanía Elias, PT 04/03/18 -  PT          Wound 08/07/18 1848 Right hip incision (Active)   Dressing Appearance dry;intact 8/10/2018 12:01 AM   Closure KEIKO 8/10/2018 12:01 AM             Physical Therapy Education     Title: PT OT SLP Therapies (Active)     Topic: Physical Therapy (Active)     Point: Mobility training (Active)    Learning Progress Summary     Learner Status Readiness Method Response Comment Documented by    Patient Active Acceptance E,TB,D NR   08/10/18 1500     Active Acceptance E NR   08/08/18 1315          Point: Home exercise program (Active)    Learning Progress Summary     Learner Status Readiness Method Response Comment Documented by    Patient Active Acceptance E,TB,D NR   08/10/18 1500     Active Acceptance E NR   08/08/18 1315          Point: Body mechanics (Active)    Learning Progress Summary     Learner Status Readiness Method Response Comment Documented by    Patient Active Acceptance E NR   08/08/18 1315          Point: Precautions (Active)    Learning Progress Summary     Learner Status Readiness Method Response Comment Documented by    Patient Active Acceptance E NR   08/08/18 1315                      User Key     Initials Effective Dates Name Provider Type Discipline     06/08/18 -  Nicole Islas, PT Physical Therapist PT     04/03/18 -  Estefanía Dumont, PT Physical Therapist PT                    PT Recommendation and Plan     Plan of Care Reviewed With: patient  Progress: improving  Outcome  "Summary: Pt alert and cooperative today. Oriented to self only. I cannot believe I do not remember what happened\". Pt   was able to amb 4' today with chair brought up to him due to fatigue.           Outcome Measures     Row Name 08/10/18 1500 08/10/18 1200 08/08/18 1300       How much help from another person do you currently need...    Turning from your back to your side while in flat bed without using bedrails? 3  -SV  -- 3  -EF    Moving from lying on back to sitting on the side of a flat bed without bedrails? 2  -SV  -- 3  -EF    Moving to and from a bed to a chair (including a wheelchair)? 2  -SV  -- 2  -EF    Standing up from a chair using your arms (e.g., wheelchair, bedside chair)? 1  -SV  -- 2  -EF    Climbing 3-5 steps with a railing? 1  -SV  -- 1  -EF    To walk in hospital room? 2  -SV  -- 1  -EF    AM-PAC 6 Clicks Score 11  -SV  -- 12  -EF       How much help from another is currently needed...    Putting on and taking off regular lower body clothing?  -- 2  -RP  --    Bathing (including washing, rinsing, and drying)  -- 2  -RP  --    Toileting (which includes using toilet bed pan or urinal)  -- 1  -RP  --    Putting on and taking off regular upper body clothing  -- 3  -RP  --    Taking care of personal grooming (such as brushing teeth)  -- 3  -RP  --    Eating meals  -- 3  -RP  --    Score  -- 14  -RP  --       Functional Assessment    Outcome Measure Options  -- AM-PAC 6 Clicks Daily Activity (OT)  -RP AM-PAC 6 Clicks Basic Mobility (PT)  -EF      User Key  (r) = Recorded By, (t) = Taken By, (c) = Cosigned By    Initials Name Provider Type    EF Nicole Islas, PT Physical Therapist    Estefanía Elias, PT Physical Therapist    Mary Farrell, OT Occupational Therapist           Time Calculation:         PT Charges     Row Name 08/10/18 1502             Time Calculation    Start Time 1434  -SV      Stop Time 1450  -SV      Time Calculation (min) 16 min  -SV      PT Received On 08/10/18  " -SV      PT - Next Appointment 08/11/18  -        User Key  (r) = Recorded By, (t) = Taken By, (c) = Cosigned By    Initials Name Provider Type    Estefanía Elias, PT Physical Therapist        Therapy Suggested Charges     Code   Minutes Charges    None           Therapy Charges for Today     Code Description Service Date Service Provider Modifiers Qty    31138501211 HC PT THER PROC EA 15 MIN 8/10/2018 Estefanía Dumont, PT GP 1    78254592621 HC PT THER SUPP EA 15 MIN 8/10/2018 Estefanía Dumont, PT GP 1          PT G-Codes  Outcome Measure Options: AM-PAC 6 Clicks Daily Activity (OT)    Estefanía Dumont, PT  8/10/2018

## 2018-08-10 NOTE — THERAPY EVALUATION
Acute Care - Occupational Therapy Initial Evaluation  [facility]     Patient Name: Harsha Mejia  : 1945  MRN: [id number]  Today's Date: 8/10/2018  Onset of Illness/Injury or Date of Surgery: 18          Admit Date: 2018       ICD-10-CM ICD-9-CM   1. Closed nondisplaced fracture of right femoral neck (CMS/[facility]) S72.001A 820.8   2. Dementia with behavioral disturbance, unspecified dementia type F03.91 294.21   3. Sinus pause I45.5 426.6   4. Difficulty walking R26.2 719.7     Patient Active Problem List   Diagnosis   • Closed displaced fracture of right femoral neck (CMS/[facility])     Past Medical History:   Diagnosis Date   • Arthritis     osteoarthritis   • Dementia    • DVT (deep venous thrombosis) (CMS/[facility])    • Hyperlipidemia      Past Surgical History:   Procedure Laterality Date   • HIP PERCUTANEOUS PINNING Right 2018    Procedure: CLOSED REDUCTION AND PINNING RIGHT HIP FRACTURE;  Surgeon: Saqib Castle Jr., MD;  Location: [facility];  Service: Orthopedics          OT ASSESSMENT FLOWSHEET (last 72 hours)      Occupational Therapy Evaluation     Row Name 08/10/18 1102                   OT Evaluation Time/Intention    Subjective Information no complaints  -RP        Document Type evaluation  -RP        Mode of Treatment occupational therapy  -RP        Patient Effort good  -RP        Symptoms Noted During/After Treatment fatigue  -RP           General Information    Patient Profile Reviewed? yes  -RP        Patient Observations alert;cooperative;agree to therapy  -RP        Patient/Family Observations Pt received semi-supine asleep in bed w/ no signs of acute distress; pt easily to awaken this date  -RP        Prior Level of Function --   unsure;per chart, pt lives on mem care unit @ [facility]  -RP        Equipment Currently Used at Home none   per pt report  -RP        Existing Precautions/Restrictions fall;oxygen therapy device and L/min  -RP           Relationship/Environment     Lives With facility resident  -RP           Resource/Environmental Concerns    Current Living Arrangements residential facility  -RP           Cognitive Assessment/Intervention- PT/OT    Orientation Status (Cognition) oriented to;person;disoriented to;place;situation;time  -RP        Follows Commands (Cognition) follows one step commands;over 90% accuracy;verbal cues/prompting required;repetition of directions required  -RP        Safety Deficit (Cognitive) mild deficit;insight into deficits/self awareness;awareness of need for assistance  -RP           Safety Issues, Functional Mobility    Comment, Safety Issues/Impairments (Mobility) Pt able to take a few side steps toward HOB w/ min A x2 for safety. Pt demo difficultyy and increased pain when weight bearing through R LE  -RP           Bed Mobility Assessment/Treatment    Bed Mobility Assessment/Treatment supine-sit;sit-supine;scooting/bridging  -RP        Scooting/Bridging [place] (Bed Mobility) minimum assist (75% patient effort);verbal cues  -RP        Supine-Sit [place] (Bed Mobility) moderate assist (50% patient effort);minimum assist (75% patient effort);verbal cues  -RP        Sit-Supine [place] (Bed Mobility) moderate assist (50% patient effort);minimum assist (75% patient effort);verbal cues  -RP        Bed Mobility, Safety Issues cognitive deficits limit understanding;decreased use of legs for bridging/pushing  -RP        Assistive Device (Bed Mobility) bed rails;head of bed elevated  -RP           Transfer Assessment/Treatment    Transfer Assessment/Treatment sit-stand transfer;stand-sit transfer  -RP           Sit-Stand Transfer    Sit-Stand [place] (Transfers) moderate assist (50% patient effort);verbal cues  -RP           Stand-Sit Transfer    Stand-Sit [place] (Transfers) moderate assist (50% patient effort);verbal cues  -RP           ADL Assessment/Intervention    BADL Assessment/Intervention grooming;toileting;lower body  dressing  -RP           Lower Body Dressing Assessment/Training    Lower Body Dressing [place] Level don;socks;moderate assist (50% patient effort);verbal cues;nonverbal cues (demo/gesture)  -RP        Lower Body Dressing Position edge of bed sitting  -RP        Comment (Lower Body Dressing) Pt able to don L sock, but required A to don R sock  -RP           Grooming Assessment/Training    [place] Level (Grooming) wash face, hands;set up;supervision  -RP        Grooming Position edge of bed sitting  -RP           Toileting Assessment/Training    [place] Level (Toileting) change pad/brief;perform perineal hygiene;dependent (less than 25% patient effort);two person assist required  -RP        Toileting Position supported standing  -RP        Comment (Toileting) Pt able to stand from EOB w/ A x1, while second person assisted w/ hygiene, brief change, and linen change d/t urine incontinent epside  -RP           General ROM    GENERAL ROM COMMENTS B UE AROM WFL  -RP           General Assessment (Manual Muscle Testing)    General Manual Muscle Testing (MMT) Assessment upper extremity strength deficits identified  -RP           Upper Extremity (Manual Muscle Testing)    Comment, MMT: Upper Extremity B UE MMT: grossly approx. 3+/5  -RP           Motor Assessment/Interventions    Additional Documentation Balance (Group)  -RP           Balance    Balance static sitting balance;static standing balance  -RP           Static Sitting Balance    Level of [place] (Unsupported Sitting, Static Balance) supervision  -RP        Sitting Position (Unsupported Sitting, Static Balance) sitting on edge of bed  -RP        Time Able to Maintain Position (Unsupported Sitting, Static Balance) more than 5 minutes  -RP           Static Standing Balance    Level of [place] (Supported Standing, Static Balance) minimal assist, 75% patient effort  -RP        Time Able to Maintain Position (Supported Standing, Static Balance)  more than 5 minutes  -RP           Positioning and Restraints    Pre-Treatment Position in bed  -RP        Post Treatment Position bed  -RP        In Bed sitting;call light within reach;encouraged to call for assist;exit alarm on;notified nsg  -RP        Restraints --   per RN, okay to leave soft wrist restraints off at this time  -RP           Pain Assessment    Additional Documentation Pain Scale: Word Pre/Post-Treatment (Group)  -RP           Pain Scale: Word Pre/Post-Treatment    Pain: Word Scale, Pretreatment 0 - no pain  -RP        Pain: Word Scale, Post-Treatment 0 - no pain  -RP           Wound 08/07/18 1848 Right hip incision    Wound - Properties Group Date first assessed: 08/07/18  -SH Time first assessed: 1848  -SH Side: Right  -SH Location: hip  -SH Type: incision  -SH       Coping    Observed Emotional State accepting;calm;cooperative  -RP        Verbalized Emotional State acceptance  -RP           Plan of Care Review    Plan of Care Reviewed With patient  -RP           Clinical Impression (OT)    OT Diagnosis Pt presents w/ decreased strength, balance, and activity tolerance, impacting functional indep. and safety w/ ADLs. Pt will benefit from skilled OT services to address deficits and increase indep. w/ ADLs.  -RP        Criteria for Skilled Therapeutic Interventions Met (OT Eval) yes;treatment indicated  -RP        Rehab Potential (OT Eval) good, to achieve stated therapy goals  -RP        Therapy Frequency (OT Eval) 5 times/wk  -RP        Care Plan Review (OT) evaluation/treatment results reviewed;care plan/treatment goals reviewed;patient/other agree to care plan  -RP        Anticipated Discharge Disposition (OT) skilled nursing facility  -RP           Planned OT Interventions    Planned Therapy Interventions (OT Eval) BADL retraining;activity tolerance training;functional balance retraining;transfer/mobility retraining;ROM/therapeutic exercise  -RP           OT Goals    Transfer Goal Selection  (OT) transfer, OT goal 1  -RP        Bathing Goal Selection (OT) bathing, OT goal 1  -RP        Dressing Goal Selection (OT) dressing, OT goal 1  -RP           Transfer Goal 1 (OT)    Activity/Assistive Device (Transfer Goal 1, OT) toilet  -RP        Leslie Level/Cues Needed (Transfer Goal 1, OT) contact guard assist  -RP        Time Frame (Transfer Goal 1, OT) long term goal (LTG)  -RP        Progress/Outcome (Transfer Goal 1, OT) goal ongoing  -RP           Bathing Goal 1 (OT)    Activity/Assistive Device (Bathing Goal 1, OT) bathing skills, all  -RP        Leslie Level/Cues Needed (Bathing Goal 1, OT) contact guard assist  -RP        Time Frame (Bathing Goal 1, OT) long term goal (LTG)  -RP        Progress/Outcomes (Bathing Goal 1, OT) goal ongoing  -RP           Dressing Goal 1 (OT)    Activity/Assistive Device (Dressing Goal 1, OT) lower body dressing  -RP        Leslie/Cues Needed (Dressing Goal 1, OT) standby assist  -RP        Time Frame (Dressing Goal 1, OT) long term goal (LTG)  -RP        Progress/Outcome (Dressing Goal 1, OT) goal ongoing  -RP          User Key  (r) = Recorded By, (t) = Taken By, (c) = Cosigned By    Initials Name Effective Dates     Mira Phillips RN 10/13/16 -     RP Mary Maldonado OT 05/03/18 -            Occupational Therapy Education     Title: PT OT SLP Therapies (Active)     Topic: Occupational Therapy (Active)     Point: ADL training (Active)     Description: Instruct learner(s) on proper safety adaptation and remediation techniques during self care or transfers.   Instruct in proper use of assistive devices.   Learning Progress Summary     Learner Status Readiness Method Response Comment Documented by    Patient Active MARCELLE Moeller OT educ on OT role in therapeutic process and pt;s POC. RP 08/10/18 1240                      User Key     Initials Effective Dates Name Provider Type Discipline     05/03/18 -  Mary Maldonado, OT Occupational Therapist OT                   OT Recommendation and Plan  Outcome Summary/Treatment Plan (OT)  Anticipated Discharge Disposition (OT): skilled nursing facility  Planned Therapy Interventions (OT Eval): BADL retraining, activity tolerance training, functional balance retraining, transfer/mobility retraining, ROM/therapeutic exercise  Therapy Frequency (OT Eval): 5 times/wk  Plan of Care Review  Plan of Care Reviewed With: patient  Plan of Care Reviewed With: patient  Outcome Summary: Pt participated in OT eval- much more alert this date; difficult to determine PLOF d/t pt unreliable historian. Per chart, pt lives in Memory Care Unit at [facility]. Pt currently demo deficits in strength, balance, and activity tolerance, impacting functional indep. w/ self-care tasks and transfers. Pt currently able to complete bed mobility tasks, sit<>stand transfers, and LB dressing tasks w/ mod A. Pt will benefit from skilled OT services to address defcits and improve indep. w/ self-care tasks.          Outcome Measures     Row Name 08/10/18 1200 08/08/18 1300          How much help from another person do you currently need...    Turning from your back to your side while in flat bed without using bedrails?  -- 3  -EF     Moving from lying on back to sitting on the side of a flat bed without bedrails?  -- 3  -EF     Moving to and from a bed to a chair (including a wheelchair)?  -- 2  -EF     Standing up from a chair using your arms (e.g., wheelchair, bedside chair)?  -- 2  -EF     Climbing 3-5 steps with a railing?  -- 1  -EF     To walk in hospital room?  -- 1  -EF     AM-PAC 6 Clicks Score  -- 12  -EF        How much help from another is currently needed...    Putting on and taking off regular lower body clothing? 2  -RP  --     Bathing (including washing, rinsing, and drying) 2  -RP  --     Toileting (which includes using toilet bed pan or urinal) 1  -RP  --     Putting on and taking off regular upper body clothing 3  -RP  --     Taking  care of personal grooming (such as brushing teeth) 3  -RP  --     Eating meals 3  -RP  --     Score 14  -RP  --        Functional Assessment    Outcome Measure Options AM-PAC 6 Clicks Daily Activity (OT)  -RP AM-PAC 6 Clicks Basic Mobility (PT)  -EF       User Key  (r) = Recorded By, (t) = Taken By, (c) = Cosigned By    Initials Name Provider Type    EF Nicole Islas, PT Physical Therapist    RP Mary Maldonado, OT Occupational Therapist          Time Calculation:   OT Start Time: 1030  OT Stop Time: 1102  OT Time Calculation (min): 32 min  Therapy Suggested Charges     Code   Minutes Charges    None           Therapy Charges for Today     Code Description Service Date Service Provider Modifiers Qty    44647983499  OT EVAL MOD COMPLEXITY 2 8/10/2018 Mary Maldonado, OT GO 1    [id number]  OT SELF CARE/MGMT/TRAIN EA 15 MIN 8/10/2018 Mary Maldonado OT GO 2               Mary Maldonado OT  8/10/2018

## 2018-08-10 NOTE — PROGRESS NOTES
Continued Stay Note  [facility]     Patient Name: Harsha Mejia  MRN: [id number]  Today's Date: 8/10/2018    Admit Date: 8/6/2018          Discharge Plan     Row Name 08/10/18 1546       Plan    Plan Plan [facility] for skilled care .  URIEL Rust    Plan Comments Arlen   [phone]) called and [facility] will not have a bed this weekend.  Pt in restraints earlier today.  Called  pt's son and guardian  (Abdias Mejia [phone]) to inquire about other facilities.  Abdias is agreeable to facility with memory care unit.  Angélica ([phone]) called to follow for [facility].  Plan [facility] for skilled care if accepted.  URIEL Rust    Row Name 08/10/18 1424       Plan    Plan  Plan [facility] for skilled care based on bed availability.  URIEL Rust    Plan Comments Per Silvina  [phone]) [facility] will not have bed available for pt this weekend.  Called and spoke with pt's son  (Guardian Abdias Mejia [phone]) and requested back up to [facility].  Son agreeable to [facility].  Arlen  ( [phone]) called to follow for [facility].  Plan [facility] for skilled care based on bed availability.  URIEL Rust              Discharge Codes    No documentation.           Marilee Boyd RN

## 2018-08-10 NOTE — PROGRESS NOTES
Continued Stay Note  [facility]     Patient Name: Harsha Mejia  MRN: [id number]  Today's Date: 8/10/2018    Admit Date: 8/6/2018          Discharge Plan     Row Name 08/10/18 1421       Plan    Plan  Plan [facility] for skilled care based on bed availability.  URIEL Rust    Plan Comments Per Silvina  [phone]) [facility] will not have bed available for pt this weekend.  Called and spoke with pt's son  (Guardian Abdias Mejia [phone]) and requested back up to [facility].  Son agreeable to [facility].  Arlen  ( [phone]) called to follow for [facility].  Plan [facility] for skilled care based on bed availability.  URIEL Rust              Discharge Codes    No documentation.           Marilee Boyd, RN

## 2018-08-10 NOTE — PROGRESS NOTES
Continued Stay Note  [facility]     Patient Name: Harsha Mejia  MRN: [id number]  Today's Date: 8/10/2018    Admit Date: 8/6/2018          Discharge Plan     Row Name 08/10/18 0949       Plan    Plan pending referral to [facility]     Patient/Family in Agreement with Plan yes    Plan Comments CCP spoke with Abdias Mejia [phone] regarding discharge plan; patient's son would like referral to [facility] for short term rehab. CCP spoke with Silvina Gamino for referral and placed in in basket. Veronique MCNEAL               Discharge Codes    No documentation.           ELIZABET Sanchez

## 2018-08-10 NOTE — PROGRESS NOTES
Continued Stay Note  [facility]     Patient Name: Harsha Mejia  MRN: [id number]  Today's Date: 8/10/2018    Admit Date: 8/6/2018          Discharge Plan     Row Name 08/10/18 9276       Plan    Plan Plan [facility] for skilled care .  URIEL Rust    Plan Comments Pt in restraints earlier today.  Called  pt's son and guardian  (Abdias Mejia [phone]) to inquire about other facilities.  Abdias is agreeable to facility with memory care unit.  Angélica ([phone]) called to follow for [facility].  Plan [facility] for skilled care if accepted.  URIEL Rust    Row Name 08/10/18 0636       Plan    Plan  Plan [facility] for skilled care based on bed availability.  URIEL Rust    Plan Comments Per Silvina  ([phone]) [facility] will not have bed available for pt this weekend.  Called and spoke with pt's son  (Guardian Abdias Mejia [phone]) and requested back up to [facility].  Son agreeable to [facility].  Arlen  ( [phone]) called to follow for [facility].  Plan [facility] for skilled care based on bed availability.  URIEL Rust              Discharge Codes    No documentation.           Marilee Boyd RN

## 2018-08-10 NOTE — PROGRESS NOTES
"Daily progress note    Chief complaint  Doing same  No specific complaints    History of present illness  73-year-old white male with history of severe dementia hyperlipidemia depression and osteoarthritis presented to [facility] emergency room after sustaining a fall with right hip pain and right leg pain.  Patient does not remember what happened.  Patient is on Xarelto for chronic DVT.  Patient workup in ER revealed right femoral neck fracture with severe bradycardia and pauses admitted for management.  At the time of interview he is alert and oriented onset all questions appropriately denies any chest pain shortness of breath dizziness lightheadedness palpitation but does not recall what happened how he fell.     REVIEW OF SYSTEMS  Review of Systems   Unable to perform ROS: Dementia   Musculoskeletal: Positive for arthralgias (right hip pain) and myalgias (right leg pain).      PHYSICAL EXAM  Blood pressure 124/76, pulse 57, temperature 97.3 °F (36.3 °C), temperature source Oral, resp. rate 16, height 188 cm (74\"), weight 79.1 kg (174 lb 6.4 oz), SpO2 97 %.    Constitutional: He is oriented to person, place, and time. No distress.   Pt is confused (which is baseline), pleasantly demented.    Head: Normocephalic and atraumatic.   Mouth/Throat: Oropharynx is clear and moist.   Eyes: Pupils are equal, round, and reactive to light. EOM are normal.   Neck: Normal range of motion. Neck supple.   Cardiovascular: Normal rate, regular rhythm and normal heart sounds.    Pulmonary/Chest: Effort normal and breath sounds normal. No respiratory distress.   Abdominal: Soft. There is no tenderness. There is no rebound and no guarding.   Musculoskeletal: Normal range of motion. He exhibits edema (pedal edema on RLE from DVT) and tenderness (right hip tenderness,  distal right femur tenderness, but no C-spine tenderness).   Neurological: He is alert and oriented to person, place, and time. He has normal sensation and normal " strength.   Skin: Skin is warm and dry.   Psychiatric: Mood and affect normal.     LABS:  Lab Results (last 24 hours)     Procedure Component Value Units Date/Time    Basic Metabolic Panel [[id number]]  (Abnormal) Collected:  08/10/18 0719    Specimen:  Blood Updated:  08/10/18 0834     Glucose 85 mg/dL      BUN 10 mg/dL      Creatinine 0.84 mg/dL      Sodium 142 mmol/L      Potassium 4.0 mmol/L      Chloride 105 mmol/L      CO2 27.3 mmol/L      Calcium 8.1 (L) mg/dL      eGFR Non African Amer 89 mL/min/1.73      BUN/Creatinine Ratio 11.9     Anion Gap 9.7 mmol/L     Narrative:       The MDRD GFR formula is only valid for adults with stable renal function between ages 18 and 70.        Imaging Results (last 24 hours)     ** No results found for the last 24 hours. **      EKG  Rhythm/Rate: Kevin, 55  PVCs  No Acute Ischemia  Non-Specific ST-T changes  Unchanged compared to prior on 7/22/2018.      Current Facility-Administered Medications:   •  aspirin chewable tablet 81 mg, 81 mg, Oral, Daily, Santosh Avery MD, 81 mg at 08/09/18 1323  •  atorvastatin (LIPITOR) tablet 10 mg, 10 mg, Oral, Daily, Santosh Avery MD, 10 mg at 08/09/18 1323  •  buPROPion XL (WELLBUTRIN XL) 24 hr tablet 150 mg, 150 mg, Oral, QAM, Santosh Avery MD, 150 mg at 08/10/18 0606  •  citalopram (CeleXA) tablet 20 mg, 20 mg, Oral, Daily, Santosh Avery MD, 20 mg at 08/09/18 1323  •  donepezil (ARICEPT) tablet 5 mg, 5 mg, Oral, Nightly, Santosh Avery MD, 5 mg at 08/09/18 2204  •  HYDROcodone-acetaminophen (NORCO) 5-325 MG per tablet 1 tablet, 1 tablet, Oral, Q6H PRN, Saqib Castle Jr., MD, 1 tablet at 08/09/18 5488  •  LORazepam (ATIVAN) injection 0.5 mg, 0.5 mg, Intravenous, Q4H PRN, Santosh Avery MD, 0.5 mg at 08/10/18 0309  •  ondansetron (ZOFRAN) injection 4 mg, 4 mg, Intravenous, Q6H PRN, Santosh Avery MD, 4 mg at 08/08/18 0823  •  pantoprazole (PROTONIX) EC tablet 40 mg, 40 mg, Oral, Q AM, Santosh Avery MD, 40 mg at 08/10/18 0606  •  rivaroxaban  (XARELTO) tablet 20 mg, 20 mg, Oral, Daily With Dinner, Saqib Castle Jr., MD, 20 mg at 08/09/18 2204  •  Insert peripheral IV, , , Once **AND** sodium chloride 0.9 % flush 10 mL, 10 mL, Intravenous, PRN, Carloz Jose MD  •  sodium chloride 0.9 % with KCl 20 mEq/L infusion, 75 mL/hr, Intravenous, Continuous, Santosh Avery MD, Last Rate: 75 mL/hr at 08/10/18 0220, 75 mL/hr at 08/10/18 0220  •  traZODone (DESYREL) tablet 25 mg, 25 mg, Oral, Nightly, Santosh Avery MD, 25 mg at 08/09/18 2218  •  vitamin B-12 (CYANOCOBALAMIN) tablet 1,000 mcg, 1,000 mcg, Oral, Daily, Santosh Avery MD, 1,000 mcg at 08/09/18 1323    ASSESSMENT  Acute right femoral neck fracture status post closed reduction and pinning  Severe bradycardia with pauses  Syncopal episode probably vasovagal   Dehydration  Dementia  Depression  History of DVT on Xarelto  Hyperlipidemia  Osteoarthritis    PLAN  CPM  Postop care  Continue to monitor rhythm  Pain management  Supportive care  Xarelto   Stress ulcer DVT prophylaxis  PT/OT  Discharge back to nursing home IN AM    SANTOSH AVERY MD

## 2018-08-10 NOTE — PLAN OF CARE
Problem: Patient Care Overview  Goal: Plan of Care Review  Outcome: Ongoing (interventions implemented as appropriate)   08/10/18 8349   Coping/Psychosocial   Plan of Care Reviewed With patient   Plan of Care Review   Progress improving   OTHER   Outcome Summary Pt participated in OT eval- much more alert this date; difficult to determine PLOF d/t pt unreliable historian. Per chart, pt lives in Memory Care Unit at [facility]. Pt currently demo deficits in strength, balance, and activity tolerance, impacting functional indep. w/ self-care tasks and transfers. Pt currently able to complete bed mobility tasks, sit<>stand transfers, and LB dressing tasks w/ mod A. Pt will benefit from skilled OT services to address defcits and improve indep. w/ self-care tasks.

## 2018-08-10 NOTE — PLAN OF CARE
Problem: Patient Care Overview  Goal: Plan of Care Review  Outcome: Ongoing (interventions implemented as appropriate)   08/10/18 0407   Coping/Psychosocial   Plan of Care Reviewed With patient   Plan of Care Review   Progress no change   OTHER   Outcome Summary Remains in restraints, during the night patient was out of bed, out of his restraints, standing at side of bed stating he needed the urinal, patient put back to bed, restraints reapplied, tolerated well, VSS, drsg to right hip cdi, no complaints of pain, will continue to monitor.     Goal: Individualization and Mutuality  Outcome: Ongoing (interventions implemented as appropriate)    Goal: Discharge Needs Assessment  Outcome: Ongoing (interventions implemented as appropriate)      Problem: Fall Risk (Adult)  Goal: Absence of Fall  Outcome: Ongoing (interventions implemented as appropriate)      Problem: Skin Injury Risk (Adult)  Goal: Skin Health and Integrity  Outcome: Ongoing (interventions implemented as appropriate)      Problem: Pain, Acute (Adult)  Goal: Acceptable Pain Control/Comfort Level  Outcome: Ongoing (interventions implemented as appropriate)      Problem: Restraint, Nonbehavioral (Nonviolent)  Goal: Rationale and Justification  Outcome: Ongoing (interventions implemented as appropriate)    Goal: Nonbehavioral (Nonviolent) Restraint: Absence of Injury/Harm  Outcome: Ongoing (interventions implemented as appropriate)    Goal: Nonbehavioral (Nonviolent) Restraint: Achievement of Discontinuation Criteria  Outcome: Ongoing (interventions implemented as appropriate)    Goal: Nonbehavioral (Nonviolent) Restraint: Preservation of Dignity and Wellbeing  Outcome: Ongoing (interventions implemented as appropriate)

## 2018-08-11 PROCEDURE — 25010000002 LORAZEPAM PER 2 MG: Performed by: HOSPITALIST

## 2018-08-11 PROCEDURE — 97110 THERAPEUTIC EXERCISES: CPT

## 2018-08-11 RX ADMIN — HYDROCODONE BITARTRATE AND ACETAMINOPHEN 1 TABLET: 5; 325 TABLET ORAL at 23:14

## 2018-08-11 RX ADMIN — DONEPEZIL HYDROCHLORIDE 5 MG: 5 TABLET, FILM COATED ORAL at 20:30

## 2018-08-11 RX ADMIN — ASPIRIN 81 MG: 81 TABLET, CHEWABLE ORAL at 09:14

## 2018-08-11 RX ADMIN — Medication 10 ML: at 20:30

## 2018-08-11 RX ADMIN — LORAZEPAM 0.5 MG: 2 INJECTION INTRAMUSCULAR; INTRAVENOUS at 23:14

## 2018-08-11 RX ADMIN — CITALOPRAM 20 MG: 20 TABLET, FILM COATED ORAL at 09:14

## 2018-08-11 RX ADMIN — TRAZODONE HYDROCHLORIDE 25 MG: 50 TABLET, FILM COATED ORAL at 20:30

## 2018-08-11 RX ADMIN — Medication 1000 MCG: at 09:14

## 2018-08-11 RX ADMIN — BUPROPION HYDROCHLORIDE 150 MG: 150 TABLET, EXTENDED RELEASE ORAL at 09:14

## 2018-08-11 RX ADMIN — HYDROCODONE BITARTRATE AND ACETAMINOPHEN 1 TABLET: 5; 325 TABLET ORAL at 05:11

## 2018-08-11 RX ADMIN — ATORVASTATIN CALCIUM 10 MG: 10 TABLET, FILM COATED ORAL at 09:14

## 2018-08-11 RX ADMIN — RIVAROXABAN 20 MG: 20 TABLET, FILM COATED ORAL at 19:18

## 2018-08-11 RX ADMIN — PANTOPRAZOLE SODIUM 40 MG: 40 TABLET, DELAYED RELEASE ORAL at 05:11

## 2018-08-11 NOTE — PLAN OF CARE
Problem: Patient Care Overview  Goal: Plan of Care Review  Outcome: Ongoing (interventions implemented as appropriate)   08/11/18 0600   Coping/Psychosocial   Plan of Care Reviewed With patient   Plan of Care Review   Progress improving   OTHER   Outcome Summary Patient more alert and coperative today. Right hip incision with dried drainage noted. Medicated once for pain. Still has some intermittent confusing. Getting up and using bedside commode. For possible discharge today. Nursing will continue to monitor.     Goal: Individualization and Mutuality  Outcome: Ongoing (interventions implemented as appropriate)    Goal: Discharge Needs Assessment  Outcome: Ongoing (interventions implemented as appropriate)      Problem: Fall Risk (Adult)  Goal: Absence of Fall  Outcome: Ongoing (interventions implemented as appropriate)      Problem: Skin Injury Risk (Adult)  Goal: Skin Health and Integrity  Outcome: Ongoing (interventions implemented as appropriate)      Problem: Pain, Acute (Adult)  Goal: Acceptable Pain Control/Comfort Level  Outcome: Ongoing (interventions implemented as appropriate)

## 2018-08-11 NOTE — PROGRESS NOTES
"Daily progress note    Chief complaint  Doing better  No specific complaints    History of present illness  73-year-old white male with history of severe dementia hyperlipidemia depression and osteoarthritis presented to [facility] emergency room after sustaining a fall with right hip pain and right leg pain.  Patient does not remember what happened.  Patient is on Xarelto for chronic DVT.  Patient workup in ER revealed right femoral neck fracture with severe bradycardia and pauses admitted for management.  At the time of interview he is alert and oriented onset all questions appropriately denies any chest pain shortness of breath dizziness lightheadedness palpitation but does not recall what happened how he fell.     REVIEW OF SYSTEMS  Review of Systems   Unable to perform ROS: Dementia   Musculoskeletal: Positive for arthralgias (right hip pain) and myalgias (right leg pain).      PHYSICAL EXAM  Blood pressure 137/83, pulse 63, temperature 98.4 °F (36.9 °C), temperature source Oral, resp. rate 16, height 188 cm (74\"), weight 79.1 kg (174 lb 6.4 oz), SpO2 94 %.    Constitutional: He is oriented to person, place, and time. No distress.   Pt is confused (which is baseline), pleasantly demented.    Head: Normocephalic and atraumatic.   Mouth/Throat: Oropharynx is clear and moist.   Eyes: Pupils are equal, round, and reactive to light. EOM are normal.   Neck: Normal range of motion. Neck supple.   Cardiovascular: Normal rate, regular rhythm and normal heart sounds.    Pulmonary/Chest: Effort normal and breath sounds normal. No respiratory distress.   Abdominal: Soft. There is no tenderness. There is no rebound and no guarding.   Musculoskeletal: Normal range of motion. He exhibits edema (pedal edema on RLE from DVT) and tenderness (right hip tenderness,  distal right femur tenderness, but no C-spine tenderness).   Neurological: He is alert and oriented to person, place, and time. He has normal sensation and " normal strength.   Skin: Skin is warm and dry.   Psychiatric: Mood and affect normal.     LABS:  Lab Results (last 24 hours)     ** No results found for the last 24 hours. **        Imaging Results (last 24 hours)     ** No results found for the last 24 hours. **      EKG  Rhythm/Rate: Kevin, 55  PVCs  No Acute Ischemia  Non-Specific ST-T changes  Unchanged compared to prior on 7/22/2018.      Current Facility-Administered Medications:   •  aspirin chewable tablet 81 mg, 81 mg, Oral, Daily, Santosh Avery MD, 81 mg at 08/11/18 0914  •  atorvastatin (LIPITOR) tablet 10 mg, 10 mg, Oral, Daily, Santosh Avery MD, 10 mg at 08/11/18 0914  •  buPROPion XL (WELLBUTRIN XL) 24 hr tablet 150 mg, 150 mg, Oral, QAM, Santosh Avery MD, 150 mg at 08/11/18 0914  •  citalopram (CeleXA) tablet 20 mg, 20 mg, Oral, Daily, Santosh Avery MD, 20 mg at 08/11/18 0914  •  donepezil (ARICEPT) tablet 5 mg, 5 mg, Oral, Nightly, Santosh Avery MD, 5 mg at 08/10/18 2036  •  HYDROcodone-acetaminophen (NORCO) 5-325 MG per tablet 1 tablet, 1 tablet, Oral, Q6H PRN, Saqib Castle Jr., MD, 1 tablet at 08/11/18 0511  •  LORazepam (ATIVAN) injection 0.5 mg, 0.5 mg, Intravenous, Q4H PRN, Santosh Avery MD, 0.5 mg at 08/10/18 0309  •  ondansetron (ZOFRAN) injection 4 mg, 4 mg, Intravenous, Q6H PRN, Santosh Avery MD, 4 mg at 08/08/18 0823  •  pantoprazole (PROTONIX) EC tablet 40 mg, 40 mg, Oral, Q AM, Santosh Avery MD, 40 mg at 08/11/18 0511  •  rivaroxaban (XARELTO) tablet 20 mg, 20 mg, Oral, Daily With Dinner, Saqib Castle Jr., MD, 20 mg at 08/10/18 1908  •  Insert peripheral IV, , , Once **AND** sodium chloride 0.9 % flush 10 mL, 10 mL, Intravenous, PRN, Carloz Jose MD  •  traZODone (DESYREL) tablet 25 mg, 25 mg, Oral, Nightly, Santosh Avery MD, 25 mg at 08/10/18 2036  •  vitamin B-12 (CYANOCOBALAMIN) tablet 1,000 mcg, 1,000 mcg, Oral, Daily, Santosh Avery MD, 1,000 mcg at 08/11/18 0914    ASSESSMENT  Acute right femoral neck fracture status post  closed reduction and pinning  Severe bradycardia with pauses  Syncopal episode probably vasovagal   Dehydration  Dementia  Depression  History of DVT on Xarelto  Hyperlipidemia  Osteoarthritis    PLAN  CPM  Postop care  Pain management  Supportive care  Xarelto   Stress ulcer DVT prophylaxis  PT/OT  Discharge to subacute rehabilitation once bed available    EB DOWNEY MD

## 2018-08-11 NOTE — PLAN OF CARE
Problem: Patient Care Overview  Goal: Plan of Care Review  Outcome: Ongoing (interventions implemented as appropriate)   08/11/18 1430   Coping/Psychosocial   Plan of Care Reviewed With patient   OTHER   Outcome Summary Pt tolerated increased distance well today. Pt needed max cues for tranfsers today but is very cooperative. Good progress noted with increased endurance.

## 2018-08-11 NOTE — PROGRESS NOTES
Continued Stay Note  [facility]     Patient Name: Harsha Mejia  MRN: [id number]  Today's Date: 8/11/2018    Admit Date: 8/6/2018          Discharge Plan     Row Name 08/11/18 1239       Plan    Plan Referral to [facility]- await evaluation     Plan Comments Incoming call from URIEL Moore asking if [facility] has accepted and has a bed today.  Spoke with Adriana weekend manager at [facility]  at [phone] and states they will evaluate patient on Monday and determine if they can accept patient.  Informed Dr. Avery that [facility] will evaluate patient on Monday and determine if they can accept patient.... CCP will follow and assist as needed..... Roasngela Palomares RN,CCP               Discharge Codes    No documentation.           Rosangela Palomares RN

## 2018-08-11 NOTE — THERAPY TREATMENT NOTE
Acute Care - Physical Therapy Treatment Note  [facility]     Patient Name: Harsha Mejia  : 1945  MRN: [id number]  Today's Date: 2018  Onset of Illness/Injury or Date of Surgery: 18  Date of Referral to PT: 18       Admit Date: 2018    Visit Dx:    ICD-10-CM ICD-9-CM   1. Closed nondisplaced fracture of right femoral neck (CMS/HCC) S72.001A 820.8   2. Dementia with behavioral disturbance, unspecified dementia type F03.91 294.21   3. Sinus pause I45.5 426.6   4. Difficulty walking R26.2 719.7     Patient Active Problem List   Diagnosis   • Closed displaced fracture of right femoral neck (CMS/HCC)       Therapy Treatment          Rehabilitation Treatment Summary     Row Name 18 1410             Treatment Time/Intention    Discipline physical therapist  -SV      Document Type therapy note (daily note)  -SV      Subjective Information no complaints  -SV      Mode of Treatment individual therapy  -SV      Patient/Family Observations no family present  -SV      Patient Effort excellent  -SV      Comment confused but very cooperative today  -SV      Recorded by [SV] Estefanía Dumont, PT 18 1430      Row Name 18 1410             Vital Signs    O2 Delivery Pre Treatment room air  -SV      O2 Delivery Intra Treatment room air  -SV      O2 Delivery Post Treatment room air  -SV      Pre Patient Position Sitting  -SV      Intra Patient Position Standing  -SV      Post Patient Position Sitting  -SV      Recorded by [SV] Estefanía Dumont, PT 18 1430      Row Name 18 1410             Cognitive Assessment/Intervention- PT/OT    Orientation Status (Cognition) oriented to;person  -SV      Follows Commands (Cognition) follows one step commands;50-74% accuracy  -SV      Recorded by [SV] Estefanía Dumont, PT 18 1430      Row Name 18 1410             Bed Mobility Assessment/Treatment    Comment (Bed Mobility) uic  -SV      Recorded by [SV] Estefanía Dumont, PT  08/11/18 1430      Row Name 08/11/18 1410             Transfer Assessment/Treatment    Transfer Assessment/Treatment sit-stand transfer;stand-sit transfer  -SV      Recorded by [SV] Estefanía Dumont, PT 08/11/18 1430      Row Name 08/11/18 1410             Sit-Stand Transfer    Sit-Stand Collins (Transfers) minimum assist (75% patient effort);moderate assist (50% patient effort);2 person assist;verbal cues;nonverbal cues (demo/gesture)  -SV      Assistive Device (Sit-Stand Transfers) walker, front-wheeled  -SV      Recorded by [SV] Estefanía Dumont, PT 08/11/18 1430      Row Name 08/11/18 1410             Stand-Sit Transfer    Stand-Sit Collins (Transfers) minimum assist (75% patient effort);moderate assist (50% patient effort);2 person assist  -SV      Assistive Device (Stand-Sit Transfers) walker, front-wheeled  -SV      Recorded by [SV] Estefanía Dumont, PT 08/11/18 1430      Row Name 08/11/18 1410             Gait/Stairs Assessment/Training    Collins Level (Gait) minimum assist (75% patient effort);2 person assist  -SV      Assistive Device (Gait) walker, front-wheeled  -SV      Distance in Feet (Gait) 55   flexed posture, shuffle gait antalgic on the RLE  -SV      Recorded by [SV] Estefanía Dumont, PT 08/11/18 1430      Row Name 08/11/18 1410             Positioning and Restraints    Pre-Treatment Position sitting in chair/recliner  -SV      Post Treatment Position chair  -SV      In Bed notified nsg;call light within reach;encouraged to call for assist;exit alarm on  -SV      Recorded by [SV] Estefanía Dumont, PT 08/11/18 1430      Row Name 08/11/18 1410             Pain Assessment    Additional Documentation --   reports pain with weight bearing only: denied otherwise  -SV      Recorded by [SV] Estefanía Dumont, PT 08/11/18 1430      Row Name                Wound 08/07/18 1848 Right hip incision    Wound - Properties Group Date first assessed: 08/07/18 [SH] Time first assessed: 1848 [] Side:  Right [SH] Location: hip [SH] Type: incision [SH] Recorded by:  [] Mira Phillips, RN 08/07/18 1848      User Key  (r) = Recorded By, (t) = Taken By, (c) = Cosigned By    Initials Name Effective Dates Discipline     Mira Phillips, RN 10/13/16 -  Nurse    Estefanía Elias, PT 04/03/18 -  PT          Wound 08/07/18 1848 Right hip incision (Active)   Dressing Appearance intact;dry 8/11/2018  2:02 PM   Closure KEIKO 8/11/2018  2:02 PM   Base dressing in place, unable to visualize 8/11/2018  2:02 PM             Physical Therapy Education     Title: PT OT SLP Therapies (Active)     Topic: Physical Therapy (Active)     Point: Mobility training (Active)    Learning Progress Summary     Learner Status Readiness Method Response Comment Documented by    Patient Active Acceptance E,TB,D NR,NL   08/11/18 1430     Active Acceptance E,TB,D NR   08/10/18 1500     Active Acceptance E NR   08/08/18 1315          Point: Home exercise program (Active)    Learning Progress Summary     Learner Status Readiness Method Response Comment Documented by    Patient Active Acceptance E,TB,D NR   08/10/18 1500     Active Acceptance E NR  EF 08/08/18 1315          Point: Body mechanics (Active)    Learning Progress Summary     Learner Status Readiness Method Response Comment Documented by    Patient Active Acceptance E NR   08/08/18 1315          Point: Precautions (Active)    Learning Progress Summary     Learner Status Readiness Method Response Comment Documented by    Patient Active Acceptance E NR  EF 08/08/18 1315                      User Key     Initials Effective Dates Name Provider Type Discipline     06/08/18 -  Nicole Islas, PT Physical Therapist PT     04/03/18 -  Estefanía Dumont, PT Physical Therapist PT                    PT Recommendation and Plan     Plan of Care Reviewed With: patient  Progress: improving  Outcome Summary: Pt tolerated increased distance well today. Pt needed max cues for tranfsers  today but is very cooperative.  Good progress noted with increased endurance.          Outcome Measures     Row Name 08/11/18 1400 08/10/18 1500 08/10/18 1200       How much help from another person do you currently need...    Turning from your back to your side while in flat bed without using bedrails? 3  -SV 3  -SV  --    Moving from lying on back to sitting on the side of a flat bed without bedrails? 2  -SV 2  -SV  --    Moving to and from a bed to a chair (including a wheelchair)? 2  -SV 2  -SV  --    Standing up from a chair using your arms (e.g., wheelchair, bedside chair)? 2  -SV 1  -SV  --    Climbing 3-5 steps with a railing? 1  -SV 1  -SV  --    To walk in hospital room? 2  -SV 2  -SV  --    AM-PAC 6 Clicks Score 12  -SV 11  -SV  --       How much help from another is currently needed...    Putting on and taking off regular lower body clothing?  --  -- 2  -RP    Bathing (including washing, rinsing, and drying)  --  -- 2  -RP    Toileting (which includes using toilet bed pan or urinal)  --  -- 1  -RP    Putting on and taking off regular upper body clothing  --  -- 3  -RP    Taking care of personal grooming (such as brushing teeth)  --  -- 3  -RP    Eating meals  --  -- 3  -RP    Score  --  -- 14  -RP       Functional Assessment    Outcome Measure Options  --  -- AM-PAC 6 Clicks Daily Activity (OT)  -RP      User Key  (r) = Recorded By, (t) = Taken By, (c) = Cosigned By    Initials Name Provider Type    Estefanía Elias, PT Physical Therapist    Mary Farrell, OT Occupational Therapist           Time Calculation:         PT Charges     Row Name 08/11/18 1432             Time Calculation    Start Time 1412  -SV      Stop Time 1428  -SV      Time Calculation (min) 16 min  -SV      PT Received On 08/11/18  -SV      PT - Next Appointment 08/12/18  -SV        User Key  (r) = Recorded By, (t) = Taken By, (c) = Cosigned By    Initials Name Provider Type    Estefanía Elias, PT Physical Therapist         Therapy Suggested Charges     Code   Minutes Charges    None           Therapy Charges for Today     Code Description Service Date Service Provider Modifiers Qty    24038330400 HC PT THER PROC EA 15 MIN 8/10/2018 Estefanía Dumont, PT GP 1    59726680936 HC PT THER SUPP EA 15 MIN 8/10/2018 Estefanía Dumont, PT GP 1    06266293792 HC PT THER PROC EA 15 MIN 8/11/2018 Estefanía Dumont, PT GP 1    61800853087 HC PT THER SUPP EA 15 MIN 8/11/2018 Estefanía Dumont, PT GP 1          PT G-Codes  Outcome Measure Options: AM-PAC 6 Clicks Daily Activity (OT)    Estefanía Dumont, PT  8/11/2018

## 2018-08-11 NOTE — PLAN OF CARE
Problem: Patient Care Overview  Goal: Plan of Care Review  Outcome: Ongoing (interventions implemented as appropriate)   08/11/18 0148   Coping/Psychosocial   Plan of Care Reviewed With patient   Plan of Care Review   Progress improving   OTHER   Outcome Summary plan to go to [facility] today. spoke with Adriana, manager on call. she stated that Federica BULLOCK, will need to re-evaluate pt on Monday. pt up to chair most of the day. walks with assistance. pleasantly confused. vss. will continue to monitor.       Problem: Fall Risk (Adult)  Goal: Absence of Fall  Outcome: Ongoing (interventions implemented as appropriate)      Problem: Skin Injury Risk (Adult)  Goal: Skin Health and Integrity  Outcome: Ongoing (interventions implemented as appropriate)      Problem: Pain, Acute (Adult)  Goal: Acceptable Pain Control/Comfort Level  Outcome: Ongoing (interventions implemented as appropriate)

## 2018-08-12 RX ORDER — DIPHENHYDRAMINE HYDROCHLORIDE, ZINC ACETATE 2; .1 G/100G; G/100G
CREAM TOPICAL 3 TIMES DAILY PRN
Status: DISCONTINUED | OUTPATIENT
Start: 2018-08-12 | End: 2018-08-13

## 2018-08-12 RX ADMIN — TRAZODONE HYDROCHLORIDE 25 MG: 50 TABLET, FILM COATED ORAL at 21:11

## 2018-08-12 RX ADMIN — PANTOPRAZOLE SODIUM 40 MG: 40 TABLET, DELAYED RELEASE ORAL at 06:18

## 2018-08-12 RX ADMIN — CITALOPRAM 20 MG: 20 TABLET, FILM COATED ORAL at 09:18

## 2018-08-12 RX ADMIN — ASPIRIN 81 MG: 81 TABLET, CHEWABLE ORAL at 09:18

## 2018-08-12 RX ADMIN — HYDROCODONE BITARTRATE AND ACETAMINOPHEN 1 TABLET: 5; 325 TABLET ORAL at 21:12

## 2018-08-12 RX ADMIN — HYDROCODONE BITARTRATE AND ACETAMINOPHEN 1 TABLET: 5; 325 TABLET ORAL at 13:27

## 2018-08-12 RX ADMIN — ATORVASTATIN CALCIUM 10 MG: 10 TABLET, FILM COATED ORAL at 09:18

## 2018-08-12 RX ADMIN — BUPROPION HYDROCHLORIDE 150 MG: 150 TABLET, EXTENDED RELEASE ORAL at 06:18

## 2018-08-12 RX ADMIN — DONEPEZIL HYDROCHLORIDE 5 MG: 5 TABLET, FILM COATED ORAL at 21:11

## 2018-08-12 RX ADMIN — RIVAROXABAN 20 MG: 20 TABLET, FILM COATED ORAL at 17:18

## 2018-08-12 RX ADMIN — Medication 1000 MCG: at 09:18

## 2018-08-12 NOTE — PLAN OF CARE
Problem: Patient Care Overview  Goal: Plan of Care Review  Outcome: Ongoing (interventions implemented as appropriate)   08/12/18 4437   Coping/Psychosocial   Plan of Care Reviewed With patient   Plan of Care Review   Progress improving   OTHER   Outcome Summary pt c/o of pain in the right hip. medicated x1. up to chair for breakfast. up to bathroom a few times. resting between care. pleasantly confused. vss. will continue to monitor.       Problem: Fall Risk (Adult)  Goal: Absence of Fall  Outcome: Ongoing (interventions implemented as appropriate)      Problem: Skin Injury Risk (Adult)  Goal: Skin Health and Integrity  Outcome: Ongoing (interventions implemented as appropriate)      Problem: Pain, Acute (Adult)  Goal: Acceptable Pain Control/Comfort Level  Outcome: Ongoing (interventions implemented as appropriate)

## 2018-08-12 NOTE — SIGNIFICANT NOTE
Pt stated he was in to much pain to ambulate with PT. Pain meds requested. Upon return to room 30 minutes later pt was returning to supine after amb to/from bathroom with nsg. PT with attempt again tomorrow.

## 2018-08-12 NOTE — PROGRESS NOTES
"Daily progress note    Chief complaint  Doing better  No specific complaints    History of present illness  73-year-old white male with history of severe dementia hyperlipidemia depression and osteoarthritis presented to [facility] emergency room after sustaining a fall with right hip pain and right leg pain.  Patient does not remember what happened.  Patient is on Xarelto for chronic DVT.  Patient workup in ER revealed right femoral neck fracture with severe bradycardia and pauses admitted for management.  At the time of interview he is alert and oriented onset all questions appropriately denies any chest pain shortness of breath dizziness lightheadedness palpitation but does not recall what happened how he fell.     REVIEW OF SYSTEMS  Review of Systems   Unable to perform ROS: Dementia   Musculoskeletal: Positive for arthralgias (right hip pain) and myalgias (right leg pain).      PHYSICAL EXAM  Blood pressure 122/69, pulse 59, temperature 98.1 °F (36.7 °C), temperature source Oral, resp. rate 16, height 188 cm (74\"), weight 79.1 kg (174 lb 6.4 oz), SpO2 97 %.    Constitutional: He is oriented to person, place, and time. No distress.   Pt is confused (which is baseline), pleasantly demented.    Head: Normocephalic and atraumatic.   Mouth/Throat: Oropharynx is clear and moist.   Eyes: Pupils are equal, round, and reactive to light. EOM are normal.   Neck: Normal range of motion. Neck supple.   Cardiovascular: Normal rate, regular rhythm and normal heart sounds.    Pulmonary/Chest: Effort normal and breath sounds normal. No respiratory distress.   Abdominal: Soft. There is no tenderness. There is no rebound and no guarding.   Musculoskeletal: Normal range of motion. He exhibits edema (pedal edema on RLE from DVT) and tenderness (right hip tenderness,  distal right femur tenderness, but no C-spine tenderness).   Neurological: He is alert and oriented to person, place, and time. He has normal sensation and " normal strength.   Skin: Skin is warm and dry.   Psychiatric: Mood and affect normal.     LABS:  Lab Results (last 24 hours)     ** No results found for the last 24 hours. **        Imaging Results (last 24 hours)     ** No results found for the last 24 hours. **      EKG  Rhythm/Rate: Kevin, 55  PVCs  No Acute Ischemia  Non-Specific ST-T changes  Unchanged compared to prior on 7/22/2018.      Current Facility-Administered Medications:   •  aspirin chewable tablet 81 mg, 81 mg, Oral, Daily, Santosh Avery MD, 81 mg at 08/12/18 0918  •  atorvastatin (LIPITOR) tablet 10 mg, 10 mg, Oral, Daily, Santosh Avery MD, 10 mg at 08/12/18 0918  •  buPROPion XL (WELLBUTRIN XL) 24 hr tablet 150 mg, 150 mg, Oral, QAM, Santosh Avery MD, 150 mg at 08/12/18 0618  •  citalopram (CeleXA) tablet 20 mg, 20 mg, Oral, Daily, Santosh Avery MD, 20 mg at 08/12/18 0918  •  donepezil (ARICEPT) tablet 5 mg, 5 mg, Oral, Nightly, Santosh Avery MD, 5 mg at 08/11/18 2030  •  HYDROcodone-acetaminophen (NORCO) 5-325 MG per tablet 1 tablet, 1 tablet, Oral, Q6H PRN, Saqib Castle Jr., MD, 1 tablet at 08/11/18 2314  •  LORazepam (ATIVAN) injection 0.5 mg, 0.5 mg, Intravenous, Q4H PRN, Santosh Avery MD, 0.5 mg at 08/11/18 2314  •  ondansetron (ZOFRAN) injection 4 mg, 4 mg, Intravenous, Q6H PRN, Santosh Avery MD, 4 mg at 08/08/18 0823  •  pantoprazole (PROTONIX) EC tablet 40 mg, 40 mg, Oral, Q AM, Santosh Avery MD, 40 mg at 08/12/18 0618  •  rivaroxaban (XARELTO) tablet 20 mg, 20 mg, Oral, Daily With Dinner, Saqib Castle Jr., MD, 20 mg at 08/11/18 1918  •  Insert peripheral IV, , , Once **AND** sodium chloride 0.9 % flush 10 mL, 10 mL, Intravenous, PRN, Carloz Jose MD, 10 mL at 08/11/18 2030  •  traZODone (DESYREL) tablet 25 mg, 25 mg, Oral, Nightly, Santosh Avery MD, 25 mg at 08/11/18 2030  •  vitamin B-12 (CYANOCOBALAMIN) tablet 1,000 mcg, 1,000 mcg, Oral, Daily, Santosh Avery MD, 1,000 mcg at 08/12/18 0918    ASSESSMENT  Acute right femoral neck  fracture status post closed reduction and pinning  Severe bradycardia with pauses  Syncopal episode probably vasovagal   Dehydration  Dementia  Depression  History of DVT on Xarelto  Hyperlipidemia  Osteoarthritis    PLAN  CPM  Postop care  Pain management  Supportive care  Xarelto   Stress ulcer DVT prophylaxis  PT/OT  Discharge to subacute rehabilitation once bed available    EB DOWNEY MD

## 2018-08-12 NOTE — PLAN OF CARE
Problem: Patient Care Overview  Goal: Plan of Care Review  Outcome: Ongoing (interventions implemented as appropriate)   08/12/18 7663   Coping/Psychosocial   Plan of Care Reviewed With patient   Plan of Care Review   Progress improving   OTHER   Outcome Summary Patient confused, asking for his wallet and pants, medicated for pain X1 and given ativan X1 for agitation, will get out of bed on his own, VSS, will continue to monitor.     Goal: Individualization and Mutuality  Outcome: Ongoing (interventions implemented as appropriate)    Goal: Discharge Needs Assessment  Outcome: Ongoing (interventions implemented as appropriate)      Problem: Fall Risk (Adult)  Goal: Absence of Fall  Outcome: Ongoing (interventions implemented as appropriate)      Problem: Skin Injury Risk (Adult)  Goal: Skin Health and Integrity  Outcome: Ongoing (interventions implemented as appropriate)      Problem: Pain, Acute (Adult)  Goal: Acceptable Pain Control/Comfort Level  Outcome: Ongoing (interventions implemented as appropriate)

## 2018-08-13 VITALS
HEIGHT: 74 IN | HEART RATE: 60 BPM | DIASTOLIC BLOOD PRESSURE: 73 MMHG | SYSTOLIC BLOOD PRESSURE: 117 MMHG | OXYGEN SATURATION: 93 % | TEMPERATURE: 98.7 F | BODY MASS INDEX: 22.38 KG/M2 | RESPIRATION RATE: 18 BRPM | WEIGHT: 174.4 LBS

## 2018-08-13 RX ORDER — ASPIRIN 81 MG/1
81 TABLET, CHEWABLE ORAL DAILY
Qty: 30 TABLET | Refills: 0 | Status: SHIPPED | OUTPATIENT
Start: 2018-08-14 | End: 2018-09-13

## 2018-08-13 RX ORDER — PANTOPRAZOLE SODIUM 40 MG/1
40 TABLET, DELAYED RELEASE ORAL DAILY
Qty: 30 TABLET | Refills: 0 | Status: SHIPPED | OUTPATIENT
Start: 2018-08-13 | End: 2018-09-12

## 2018-08-13 RX ADMIN — DIPHENHYDRAMINE HYDROCHLORIDE, ZINC ACETATE: 2; .1 CREAM TOPICAL at 07:24

## 2018-08-13 RX ADMIN — PANTOPRAZOLE SODIUM 40 MG: 40 TABLET, DELAYED RELEASE ORAL at 06:41

## 2018-08-13 RX ADMIN — CITALOPRAM 20 MG: 20 TABLET, FILM COATED ORAL at 08:24

## 2018-08-13 RX ADMIN — Medication 1000 MCG: at 08:24

## 2018-08-13 RX ADMIN — BUPROPION HYDROCHLORIDE 150 MG: 150 TABLET, EXTENDED RELEASE ORAL at 06:41

## 2018-08-13 RX ADMIN — ATORVASTATIN CALCIUM 10 MG: 10 TABLET, FILM COATED ORAL at 08:24

## 2018-08-13 RX ADMIN — ASPIRIN 81 MG: 81 TABLET, CHEWABLE ORAL at 08:24

## 2018-08-13 NOTE — PLAN OF CARE
Problem: Patient Care Overview  Goal: Plan of Care Review  Outcome: Ongoing (interventions implemented as appropriate)   08/13/18 0409   Coping/Psychosocial   Plan of Care Reviewed With patient   Plan of Care Review   Progress improving   OTHER   Outcome Summary Patient more alert but still has intermittent confusion. Still continues to get out of bed without calling for help. Medicated once for pain. Resting between care. Complaining of being a Nuisance to staff, wants to f go home. [facility] to re-evaluate patient in the morning for possible placement. Nursing will continue to monitor.     Goal: Individualization and Mutuality  Outcome: Ongoing (interventions implemented as appropriate)    Goal: Discharge Needs Assessment  Outcome: Ongoing (interventions implemented as appropriate)      Problem: Fall Risk (Adult)  Goal: Absence of Fall  Outcome: Ongoing (interventions implemented as appropriate)      Problem: Skin Injury Risk (Adult)  Goal: Skin Health and Integrity  Outcome: Ongoing (interventions implemented as appropriate)      Problem: Pain, Acute (Adult)  Goal: Acceptable Pain Control/Comfort Level  Outcome: Ongoing (interventions implemented as appropriate)

## 2018-08-13 NOTE — PROGRESS NOTES
Continued Stay Note  [facility]     Patient Name: Harsha Mejia  MRN: [id number]  Today's Date: 8/13/2018    Admit Date: 8/6/2018          Discharge Plan     Row Name 08/13/18 1122       Plan    Plan Plan [facility] for skilled care. .  URIEL Rust    Patient/Family in Agreement with Plan yes    Plan Comments Sabino ([phone]) here and states [facility] can accept pt. Bed is available today.  Called and spoke with pt's son (Abdias Mejia [phone]) and he is agreeable to [facility].  Son to transport.  Plan [facility] for skilled care.  Yocasta RN              Discharge Codes    No documentation.           Marilee Boyd, RN

## 2018-08-13 NOTE — PROGRESS NOTES
"Daily progress note    Chief complaint  Doing better  No specific complaints    History of present illness  73-year-old white male with history of severe dementia hyperlipidemia depression and osteoarthritis presented to [facility] emergency room after sustaining a fall with right hip pain and right leg pain.  Patient does not remember what happened.  Patient is on Xarelto for chronic DVT.  Patient workup in ER revealed right femoral neck fracture with severe bradycardia and pauses admitted for management.  At the time of interview he is alert and oriented onset all questions appropriately denies any chest pain shortness of breath dizziness lightheadedness palpitation but does not recall what happened how he fell.     REVIEW OF SYSTEMS  Review of Systems   Unable to perform ROS: Dementia   Musculoskeletal: Positive for arthralgias (right hip pain) and myalgias (right leg pain).      PHYSICAL EXAM  Blood pressure 150/83, pulse 60, temperature 97.5 °F (36.4 °C), temperature source Oral, resp. rate 18, height 188 cm (74\"), weight 79.1 kg (174 lb 6.4 oz), SpO2 93 %.    Constitutional: He is oriented to person, place, and time. No distress.   Pt is confused (which is baseline), pleasantly demented.    Head: Normocephalic and atraumatic.   Mouth/Throat: Oropharynx is clear and moist.   Eyes: Pupils are equal, round, and reactive to light. EOM are normal.   Neck: Normal range of motion. Neck supple.   Cardiovascular: Normal rate, regular rhythm and normal heart sounds.    Pulmonary/Chest: Effort normal and breath sounds normal. No respiratory distress.   Abdominal: Soft. There is no tenderness. There is no rebound and no guarding.   Musculoskeletal: Normal range of motion. He exhibits edema (pedal edema on RLE from DVT) and tenderness (right hip tenderness,  distal right femur tenderness, but no C-spine tenderness).   Neurological: He is alert and oriented to person, place, and time. He has normal sensation and " normal strength.   Skin: Skin is warm and dry.   Psychiatric: Mood and affect normal.     LABS:  Lab Results (last 24 hours)     ** No results found for the last 24 hours. **        Imaging Results (last 24 hours)     ** No results found for the last 24 hours. **      EKG  Rhythm/Rate: Kevin, 55  PVCs  No Acute Ischemia  Non-Specific ST-T changes  Unchanged compared to prior on 7/22/2018.      Current Facility-Administered Medications:   •  aspirin chewable tablet 81 mg, 81 mg, Oral, Daily, Santosh Avery MD, 81 mg at 08/13/18 0824  •  atorvastatin (LIPITOR) tablet 10 mg, 10 mg, Oral, Daily, Santosh Avery MD, 10 mg at 08/13/18 0824  •  buPROPion XL (WELLBUTRIN XL) 24 hr tablet 150 mg, 150 mg, Oral, QAM, Santosh Avery MD, 150 mg at 08/13/18 0641  •  citalopram (CeleXA) tablet 20 mg, 20 mg, Oral, Daily, Santosh Avery MD, 20 mg at 08/13/18 0824  •  diphenhydrAMINE-zinc acetate 2-0.1 % cream, , Topical, TID PRN, Santosh Avery MD  •  donepezil (ARICEPT) tablet 5 mg, 5 mg, Oral, Nightly, Santosh Avery MD, 5 mg at 08/12/18 2111  •  HYDROcodone-acetaminophen (NORCO) 5-325 MG per tablet 1 tablet, 1 tablet, Oral, Q6H PRN, Saqib Castle Jr., MD, 1 tablet at 08/12/18 2112  •  LORazepam (ATIVAN) injection 0.5 mg, 0.5 mg, Intravenous, Q4H PRN, Santosh Avery MD, 0.5 mg at 08/11/18 2314  •  ondansetron (ZOFRAN) injection 4 mg, 4 mg, Intravenous, Q6H PRN, Santosh Avery MD, 4 mg at 08/08/18 0823  •  pantoprazole (PROTONIX) EC tablet 40 mg, 40 mg, Oral, Q AM, Santosh Avery MD, 40 mg at 08/13/18 0641  •  rivaroxaban (XARELTO) tablet 20 mg, 20 mg, Oral, Daily With Dinner, Saqib Castle Jr., MD, 20 mg at 08/12/18 1718  •  Insert peripheral IV, , , Once **AND** sodium chloride 0.9 % flush 10 mL, 10 mL, Intravenous, PRN, Carloz Jose MD, 10 mL at 08/11/18 2030  •  traZODone (DESYREL) tablet 25 mg, 25 mg, Oral, Nightly, Santosh Avery MD, 25 mg at 08/12/18 2111  •  vitamin B-12 (CYANOCOBALAMIN) tablet 1,000 mcg, 1,000 mcg, Oral, Daily,  Eb Avery MD, 1,000 mcg at 08/13/18 0824    ASSESSMENT  Acute right femoral neck fracture status post closed reduction and pinning  Severe bradycardia with pauses resolved  Syncopal episode probably vasovagal   Dehydration  Dementia  Depression  History of DVT on Xarelto  Hyperlipidemia  Osteoarthritis    PLAN  Discharge back to nursing home  Discharge summary dictated    EB AVERY MD

## 2018-08-13 NOTE — DISCHARGE SUMMARY
Discharge summary    Date of admission 8/6/2018  Date of discharge 8/13/2018    Final diagnosis  Acute right femoral neck fracture status post closed reduction and pinning  Severe bradycardia with pauses resolved  Syncopal episode probably vasovagal   Dehydration  Dementia  Depression  History of DVT on Xarelto  Hyperlipidemia  Osteoarthritis    Discharge medications    Current Facility-Administered Medications:   •  aspirin chewable tablet 81 mg, 81 mg, Oral, Daily, Santosh Avery MD, 81 mg at 08/13/18 0824  •  atorvastatin (LIPITOR) tablet 10 mg, 10 mg, Oral, Daily, Santosh Avery MD, 10 mg at 08/13/18 0824  •  buPROPion XL (WELLBUTRIN XL) 24 hr tablet 150 mg, 150 mg, Oral, QAM, Santosh Avery MD, 150 mg at 08/13/18 0641  •  citalopram (CeleXA) tablet 20 mg, 20 mg, Oral, Daily, Santosh Avery MD, 20 mg at 08/13/18 0824  •  donepezil (ARICEPT) tablet 5 mg, 5 mg, Oral, Nightly, Santosh Avery MD, 5 mg at 08/12/18 2111  •  pantoprazole (PROTONIX) EC tablet 40 mg, 40 mg, Oral, Q AM, Santosh Avery MD, 40 mg at 08/13/18 0641  •  rivaroxaban (XARELTO) tablet 20 mg, 20 mg, Oral, Daily With Dinner, Saqib Castle Jr., MD, 20 mg at 08/12/18 1718  •  Insert peripheral IV, , , Once **AND** sodium chloride 0.9 % flush 10 mL, 10 mL, Intravenous, PRN, Carloz Jose MD, 10 mL at 08/11/18 2030  •  traZODone (DESYREL) tablet 25 mg, 25 mg, Oral, Nightly, Santosh Avery MD, 25 mg at 08/12/18 2111  •  vitamin B-12 (CYANOCOBALAMIN) tablet 1,000 mcg, 1,000 mcg, Oral, Daily, Santosh Avery MD, 1,000 mcg at 08/13/18 0824     Consult obtained  Cardiology  Orthopedic surgery    Procedures  Status post closed reduction and pinning    Hospital course  73-year-old white male with history of severe dementia admitted through emergency room with right leg pain after the fall.  Patient workup revealed acute right femoral neck fracture admitted for management.  Patient also found to have severe bradycardia with pauses and syncopal episode.  Patient  followed by cardiology and his bradycardia resolved and recommended no need for permanent pacemaker.  Patient after medical and cardiac clearance underwent surgery with closed reduction and pinning with orthopedic surgery.  Postoperatively he has delirium which is also resolved.  Now patient heart rate is between 59-65 and is alert and oriented and all the labs within normal limits hemoglobin 12.8 and he is working with PT OT will be discharged to subacute rehabilitation.  Patient Xarelto resumed after surgery.    Discharge diet regular    Activity per PT OT    Medication as above    Further care per accepting physician at subacute rehabilitation    EB DOWNEY MD

## 2018-08-14 NOTE — PROGRESS NOTES
Case Management Discharge Note    Final Note: Pt discharged on 8/13 to [facility]  for skilled care.  URIEL Rust    Destination - Selection Complete     Service Request Status Selected Specialties Address Phone Number Fax Number    [facility] [address] [place] [id number] [phone]      Durable Medical Equipment     No service has been selected for the patient.      Dialysis/Infusion     No service has been selected for the patient.      Home Medical Care     No service has been selected for the patient.      Social Care     No service has been selected for the patient.        Other: Other (Private Auto)    Final Discharge Disposition Code: 03 - skilled nursing facility (SNF)

## 2021-01-29 ENCOUNTER — APPOINTMENT (OUTPATIENT)
Dept: CT IMAGING | Facility: HOSPITAL | Age: 76
End: 2021-01-29

## 2021-01-29 ENCOUNTER — HOSPITAL ENCOUNTER (INPATIENT)
Facility: HOSPITAL | Age: 76
LOS: 3 days | Discharge: HOME | End: 2021-02-01
Attending: EMERGENCY MEDICINE | Admitting: HOSPITALIST

## 2021-01-29 ENCOUNTER — APPOINTMENT (OUTPATIENT)
Dept: GENERAL RADIOLOGY | Facility: HOSPITAL | Age: 76
End: 2021-01-29

## 2021-01-29 DIAGNOSIS — R00.1 BRADYCARDIA: ICD-10-CM

## 2021-01-29 DIAGNOSIS — R55 SYNCOPE, UNSPECIFIED SYNCOPE TYPE: Primary | ICD-10-CM

## 2021-01-29 LAB
ALBUMIN SERPL-MCNC: 3.4 G/DL (ref 3.5–5.2)
ALBUMIN/GLOB SERPL: 2.1 G/DL
ALP SERPL-CCNC: 49 U/L (ref 39–117)
ALT SERPL W P-5'-P-CCNC: 10 U/L (ref 1–41)
ANION GAP SERPL CALCULATED.3IONS-SCNC: 7 MMOL/L (ref 5–15)
APTT PPP: 27.1 SECONDS (ref 22.7–35.4)
AST SERPL-CCNC: 13 U/L (ref 1–40)
BACTERIA UR QL AUTO: ABNORMAL /HPF
BASOPHILS # BLD AUTO: 0.04 10*3/MM3 (ref 0–0.2)
BASOPHILS NFR BLD AUTO: 0.7 % (ref 0–1.5)
BILIRUB SERPL-MCNC: 0.3 MG/DL (ref 0–1.2)
BILIRUB UR QL STRIP: NEGATIVE
BUN SERPL-MCNC: 18 MG/DL (ref 8–23)
BUN/CREAT SERPL: 18.2 (ref 7–25)
CALCIUM SPEC-SCNC: 8.7 MG/DL (ref 8.6–10.5)
CHLORIDE SERPL-SCNC: 107 MMOL/L (ref 98–107)
CLARITY UR: CLEAR
CO2 SERPL-SCNC: 28 MMOL/L (ref 22–29)
COLOR UR: ABNORMAL
CREAT SERPL-MCNC: 0.99 MG/DL (ref 0.76–1.27)
DEPRECATED RDW RBC AUTO: 49 FL (ref 37–54)
EOSINOPHIL # BLD AUTO: 0.2 10*3/MM3 (ref 0–0.4)
EOSINOPHIL NFR BLD AUTO: 3.4 % (ref 0.3–6.2)
ERYTHROCYTE [DISTWIDTH] IN BLOOD BY AUTOMATED COUNT: 14 % (ref 12.3–15.4)
GFR SERPL CREATININE-BSD FRML MDRD: 73 ML/MIN/1.73
GLOBULIN UR ELPH-MCNC: 1.6 GM/DL
GLUCOSE SERPL-MCNC: 109 MG/DL (ref 65–99)
GLUCOSE UR STRIP-MCNC: NEGATIVE MG/DL
HCT VFR BLD AUTO: 40.6 % (ref 37.5–51)
HGB BLD-MCNC: 13.1 G/DL (ref 13–17.7)
HGB UR QL STRIP.AUTO: ABNORMAL
HOLD SPECIMEN: NORMAL
HOLD SPECIMEN: NORMAL
HYALINE CASTS UR QL AUTO: ABNORMAL /LPF
IMM GRANULOCYTES # BLD AUTO: 0.02 10*3/MM3 (ref 0–0.05)
IMM GRANULOCYTES NFR BLD AUTO: 0.3 % (ref 0–0.5)
INR PPP: 1.27 (ref 0.9–1.1)
KETONES UR QL STRIP: ABNORMAL
LEUKOCYTE ESTERASE UR QL STRIP.AUTO: NEGATIVE
LYMPHOCYTES # BLD AUTO: 3.03 10*3/MM3 (ref 0.7–3.1)
LYMPHOCYTES NFR BLD AUTO: 50.9 % (ref 19.6–45.3)
MCH RBC QN AUTO: 31.2 PG (ref 26.6–33)
MCHC RBC AUTO-ENTMCNC: 32.3 G/DL (ref 31.5–35.7)
MCV RBC AUTO: 96.7 FL (ref 79–97)
MONOCYTES # BLD AUTO: 0.39 10*3/MM3 (ref 0.1–0.9)
MONOCYTES NFR BLD AUTO: 6.6 % (ref 5–12)
NEUTROPHILS NFR BLD AUTO: 2.27 10*3/MM3 (ref 1.7–7)
NEUTROPHILS NFR BLD AUTO: 38.1 % (ref 42.7–76)
NITRITE UR QL STRIP: NEGATIVE
NRBC BLD AUTO-RTO: 0 /100 WBC (ref 0–0.2)
NT-PROBNP SERPL-MCNC: 554.8 PG/ML (ref 0–1800)
PH UR STRIP.AUTO: 6 [PH] (ref 5–8)
PLATELET # BLD AUTO: 149 10*3/MM3 (ref 140–450)
PMV BLD AUTO: 9.6 FL (ref 6–12)
POTASSIUM SERPL-SCNC: 3.5 MMOL/L (ref 3.5–5.2)
PROT SERPL-MCNC: 5 G/DL (ref 6–8.5)
PROT UR QL STRIP: NEGATIVE
PROTHROMBIN TIME: 15.7 SECONDS (ref 11.7–14.2)
RBC # BLD AUTO: 4.2 10*6/MM3 (ref 4.14–5.8)
RBC # UR: ABNORMAL /HPF
REF LAB TEST METHOD: ABNORMAL
SODIUM SERPL-SCNC: 142 MMOL/L (ref 136–145)
SP GR UR STRIP: 1.02 (ref 1–1.03)
SQUAMOUS #/AREA URNS HPF: ABNORMAL /HPF
TROPONIN T SERPL-MCNC: <0.01 NG/ML (ref 0–0.03)
TROPONIN T SERPL-MCNC: <0.01 NG/ML (ref 0–0.03)
UROBILINOGEN UR QL STRIP: ABNORMAL
WBC # BLD AUTO: 5.95 10*3/MM3 (ref 3.4–10.8)
WBC UR QL AUTO: ABNORMAL /HPF
WHOLE BLOOD HOLD SPECIMEN: NORMAL
WHOLE BLOOD HOLD SPECIMEN: NORMAL

## 2021-01-29 PROCEDURE — U0004 COV-19 TEST NON-CDC HGH THRU: HCPCS | Performed by: EMERGENCY MEDICINE

## 2021-01-29 PROCEDURE — 85730 THROMBOPLASTIN TIME PARTIAL: CPT | Performed by: EMERGENCY MEDICINE

## 2021-01-29 PROCEDURE — 25010000003 ATROPINE SULFATE: Performed by: EMERGENCY MEDICINE

## 2021-01-29 PROCEDURE — 71045 X-RAY EXAM CHEST 1 VIEW: CPT

## 2021-01-29 PROCEDURE — 85610 PROTHROMBIN TIME: CPT | Performed by: EMERGENCY MEDICINE

## 2021-01-29 PROCEDURE — 85025 COMPLETE CBC W/AUTO DIFF WBC: CPT | Performed by: EMERGENCY MEDICINE

## 2021-01-29 PROCEDURE — 93010 ELECTROCARDIOGRAM REPORT: CPT | Performed by: INTERNAL MEDICINE

## 2021-01-29 PROCEDURE — 99285 EMERGENCY DEPT VISIT HI MDM: CPT

## 2021-01-29 PROCEDURE — 25010000002 ONDANSETRON PER 1 MG: Performed by: EMERGENCY MEDICINE

## 2021-01-29 PROCEDURE — 84484 ASSAY OF TROPONIN QUANT: CPT | Performed by: EMERGENCY MEDICINE

## 2021-01-29 PROCEDURE — 72125 CT NECK SPINE W/O DYE: CPT

## 2021-01-29 PROCEDURE — 83880 ASSAY OF NATRIURETIC PEPTIDE: CPT | Performed by: EMERGENCY MEDICINE

## 2021-01-29 PROCEDURE — 93005 ELECTROCARDIOGRAM TRACING: CPT | Performed by: EMERGENCY MEDICINE

## 2021-01-29 PROCEDURE — 81001 URINALYSIS AUTO W/SCOPE: CPT | Performed by: EMERGENCY MEDICINE

## 2021-01-29 PROCEDURE — 80053 COMPREHEN METABOLIC PANEL: CPT | Performed by: EMERGENCY MEDICINE

## 2021-01-29 PROCEDURE — P9612 CATHETERIZE FOR URINE SPEC: HCPCS

## 2021-01-29 PROCEDURE — 70450 CT HEAD/BRAIN W/O DYE: CPT

## 2021-01-29 RX ORDER — ONDANSETRON 2 MG/ML
4 INJECTION INTRAMUSCULAR; INTRAVENOUS ONCE
Status: COMPLETED | OUTPATIENT
Start: 2021-01-29 | End: 2021-01-29

## 2021-01-29 RX ORDER — SODIUM CHLORIDE 0.9 % (FLUSH) 0.9 %
10 SYRINGE (ML) INJECTION AS NEEDED
Status: DISCONTINUED | OUTPATIENT
Start: 2021-01-29 | End: 2021-02-01 | Stop reason: HOSPADM

## 2021-01-29 RX ADMIN — ONDANSETRON 4 MG: 2 INJECTION INTRAMUSCULAR; INTRAVENOUS at 20:17

## 2021-01-29 RX ADMIN — ATROPINE SULFATE 0.5 MG: 0.1 INJECTION PARENTERAL at 20:58

## 2021-01-29 RX ADMIN — ONDANSETRON 4 MG: 2 INJECTION INTRAMUSCULAR; INTRAVENOUS at 18:53

## 2021-01-30 ENCOUNTER — APPOINTMENT (OUTPATIENT)
Dept: CARDIOLOGY | Facility: HOSPITAL | Age: 76
End: 2021-01-30

## 2021-01-30 LAB
ANION GAP SERPL CALCULATED.3IONS-SCNC: 7.3 MMOL/L (ref 5–15)
AORTIC ARCH: 3.7 CM
ASCENDING AORTA: 3.2 CM
BASOPHILS # BLD AUTO: 0.02 10*3/MM3 (ref 0–0.2)
BASOPHILS NFR BLD AUTO: 0.2 % (ref 0–1.5)
BH CV ECHO MEAS - ACS: 2.2 CM
BH CV ECHO MEAS - AO ARCH DIAM (PROXIMAL TRANS.): 3.7 CM
BH CV ECHO MEAS - AO MAX PG (FULL): 4 MMHG
BH CV ECHO MEAS - AO MAX PG: 7.7 MMHG
BH CV ECHO MEAS - AO MEAN PG (FULL): 3 MMHG
BH CV ECHO MEAS - AO MEAN PG: 5 MMHG
BH CV ECHO MEAS - AO ROOT AREA (BSA CORRECTED): 1.3
BH CV ECHO MEAS - AO ROOT AREA: 6.2 CM^2
BH CV ECHO MEAS - AO ROOT DIAM: 2.8 CM
BH CV ECHO MEAS - AO V2 MAX: 139 CM/SEC
BH CV ECHO MEAS - AO V2 MEAN: 106 CM/SEC
BH CV ECHO MEAS - AO V2 VTI: 29.6 CM
BH CV ECHO MEAS - ASC AORTA: 3.4 CM
BH CV ECHO MEAS - AVA(I,A): 2.2 CM^2
BH CV ECHO MEAS - AVA(I,D): 2.2 CM^2
BH CV ECHO MEAS - AVA(V,A): 2.4 CM^2
BH CV ECHO MEAS - AVA(V,D): 2.4 CM^2
BH CV ECHO MEAS - BSA(HAYCOCK): 2.1 M^2
BH CV ECHO MEAS - BSA: 2.1 M^2
BH CV ECHO MEAS - BZI_BMI: 23.1 KILOGRAMS/M^2
BH CV ECHO MEAS - BZI_METRIC_HEIGHT: 188 CM
BH CV ECHO MEAS - BZI_METRIC_WEIGHT: 81.6 KG
BH CV ECHO MEAS - EDV(CUBED): 125 ML
BH CV ECHO MEAS - EDV(MOD-SP2): 124 ML
BH CV ECHO MEAS - EDV(MOD-SP4): 113 ML
BH CV ECHO MEAS - EDV(TEICH): 118.2 ML
BH CV ECHO MEAS - EF(CUBED): 82.4 %
BH CV ECHO MEAS - EF(MOD-BP): 55.4 %
BH CV ECHO MEAS - EF(MOD-SP2): 56.5 %
BH CV ECHO MEAS - EF(MOD-SP4): 57.5 %
BH CV ECHO MEAS - EF(TEICH): 75 %
BH CV ECHO MEAS - ESV(CUBED): 22 ML
BH CV ECHO MEAS - ESV(MOD-SP2): 54 ML
BH CV ECHO MEAS - ESV(MOD-SP4): 48 ML
BH CV ECHO MEAS - ESV(TEICH): 29.6 ML
BH CV ECHO MEAS - FS: 44 %
BH CV ECHO MEAS - IVS/LVPW: 1.1
BH CV ECHO MEAS - IVSD: 1 CM
BH CV ECHO MEAS - LAT PEAK E' VEL: 13.3 CM/SEC
BH CV ECHO MEAS - LV DIASTOLIC VOL/BSA (35-75): 54.4 ML/M^2
BH CV ECHO MEAS - LV MASS(C)D: 169.9 GRAMS
BH CV ECHO MEAS - LV MASS(C)DI: 81.8 GRAMS/M^2
BH CV ECHO MEAS - LV MAX PG: 3.7 MMHG
BH CV ECHO MEAS - LV MEAN PG: 2 MMHG
BH CV ECHO MEAS - LV SYSTOLIC VOL/BSA (12-30): 23.1 ML/M^2
BH CV ECHO MEAS - LV V1 MAX: 96.6 CM/SEC
BH CV ECHO MEAS - LV V1 MEAN: 61.6 CM/SEC
BH CV ECHO MEAS - LV V1 VTI: 19.2 CM
BH CV ECHO MEAS - LVIDD: 5 CM
BH CV ECHO MEAS - LVIDS: 2.8 CM
BH CV ECHO MEAS - LVLD AP2: 7.7 CM
BH CV ECHO MEAS - LVLD AP4: 7.1 CM
BH CV ECHO MEAS - LVLS AP2: 7 CM
BH CV ECHO MEAS - LVLS AP4: 5.9 CM
BH CV ECHO MEAS - LVOT AREA (M): 3.5 CM^2
BH CV ECHO MEAS - LVOT AREA: 3.5 CM^2
BH CV ECHO MEAS - LVOT DIAM: 2.1 CM
BH CV ECHO MEAS - LVPWD: 0.9 CM
BH CV ECHO MEAS - MED PEAK E' VEL: 11 CM/SEC
BH CV ECHO MEAS - MV A MAX VEL: 84.4 CM/SEC
BH CV ECHO MEAS - MV DEC SLOPE: 285 CM/SEC^2
BH CV ECHO MEAS - MV DEC TIME: 0.33 SEC
BH CV ECHO MEAS - MV E MAX VEL: 55.3 CM/SEC
BH CV ECHO MEAS - MV E/A: 0.66
BH CV ECHO MEAS - MV MAX PG: 3.7 MMHG
BH CV ECHO MEAS - MV MEAN PG: 1 MMHG
BH CV ECHO MEAS - MV P1/2T MAX VEL: 83.8 CM/SEC
BH CV ECHO MEAS - MV P1/2T: 86.1 MSEC
BH CV ECHO MEAS - MV V2 MAX: 95.7 CM/SEC
BH CV ECHO MEAS - MV V2 MEAN: 49.8 CM/SEC
BH CV ECHO MEAS - MV V2 VTI: 32.2 CM
BH CV ECHO MEAS - MVA P1/2T LCG: 2.6 CM^2
BH CV ECHO MEAS - MVA(P1/2T): 2.6 CM^2
BH CV ECHO MEAS - MVA(VTI): 2.1 CM^2
BH CV ECHO MEAS - PA ACC TIME: 0.13 SEC
BH CV ECHO MEAS - PA MAX PG (FULL): 3 MMHG
BH CV ECHO MEAS - PA MAX PG: 4 MMHG
BH CV ECHO MEAS - PA PR(ACCEL): 21 MMHG
BH CV ECHO MEAS - PA V2 MAX: 100 CM/SEC
BH CV ECHO MEAS - PULM A REVS DUR: 0.18 SEC
BH CV ECHO MEAS - PULM A REVS VEL: 35.2 CM/SEC
BH CV ECHO MEAS - PULM DIAS VEL: 34.3 CM/SEC
BH CV ECHO MEAS - PULM S/D: 1.2
BH CV ECHO MEAS - PULM SYS VEL: 41.4 CM/SEC
BH CV ECHO MEAS - PVA(V,A): 1.5 CM^2
BH CV ECHO MEAS - PVA(V,D): 1.5 CM^2
BH CV ECHO MEAS - QP/QS: 0.53
BH CV ECHO MEAS - RAP SYSTOLE: 3 MMHG
BH CV ECHO MEAS - RV MAX PG: 0.96 MMHG
BH CV ECHO MEAS - RV MEAN PG: 1 MMHG
BH CV ECHO MEAS - RV V1 MAX: 49 CM/SEC
BH CV ECHO MEAS - RV V1 MEAN: 38.2 CM/SEC
BH CV ECHO MEAS - RV V1 VTI: 11.2 CM
BH CV ECHO MEAS - RVOT AREA: 3.1 CM^2
BH CV ECHO MEAS - RVOT DIAM: 2 CM
BH CV ECHO MEAS - RVSP: 41 MMHG
BH CV ECHO MEAS - SI(AO): 87.7 ML/M^2
BH CV ECHO MEAS - SI(CUBED): 49.6 ML/M^2
BH CV ECHO MEAS - SI(LVOT): 32 ML/M^2
BH CV ECHO MEAS - SI(MOD-SP2): 33.7 ML/M^2
BH CV ECHO MEAS - SI(MOD-SP4): 31.3 ML/M^2
BH CV ECHO MEAS - SI(TEICH): 42.7 ML/M^2
BH CV ECHO MEAS - SV(AO): 182.3 ML
BH CV ECHO MEAS - SV(CUBED): 103 ML
BH CV ECHO MEAS - SV(LVOT): 66.5 ML
BH CV ECHO MEAS - SV(MOD-SP2): 70 ML
BH CV ECHO MEAS - SV(MOD-SP4): 65 ML
BH CV ECHO MEAS - SV(RVOT): 35.2 ML
BH CV ECHO MEAS - SV(TEICH): 88.7 ML
BH CV ECHO MEAS - TAPSE (>1.6): 2.5 CM
BH CV ECHO MEAS - TR MAX VEL: 307 CM/SEC
BH CV ECHO MEASUREMENTS AVERAGE E/E' RATIO: 4.55
BH CV XLRA - RV BASE: 3.9 CM
BH CV XLRA - RV LENGTH: 6.1 CM
BH CV XLRA - RV MID: 3.5 CM
BH CV XLRA - TDI S': 16.3 CM/SEC
BUN SERPL-MCNC: 18 MG/DL (ref 8–23)
BUN/CREAT SERPL: 19.1 (ref 7–25)
CALCIUM SPEC-SCNC: 8.7 MG/DL (ref 8.6–10.5)
CHLORIDE SERPL-SCNC: 104 MMOL/L (ref 98–107)
CO2 SERPL-SCNC: 28.7 MMOL/L (ref 22–29)
CREAT SERPL-MCNC: 0.94 MG/DL (ref 0.76–1.27)
DEPRECATED RDW RBC AUTO: 49.9 FL (ref 37–54)
EOSINOPHIL # BLD AUTO: 0 10*3/MM3 (ref 0–0.4)
EOSINOPHIL NFR BLD AUTO: 0 % (ref 0.3–6.2)
ERYTHROCYTE [DISTWIDTH] IN BLOOD BY AUTOMATED COUNT: 14 % (ref 12.3–15.4)
GFR SERPL CREATININE-BSD FRML MDRD: 78 ML/MIN/1.73
GLUCOSE SERPL-MCNC: 98 MG/DL (ref 65–99)
HCT VFR BLD AUTO: 44.3 % (ref 37.5–51)
HGB BLD-MCNC: 14.2 G/DL (ref 13–17.7)
IMM GRANULOCYTES # BLD AUTO: 0.02 10*3/MM3 (ref 0–0.05)
IMM GRANULOCYTES NFR BLD AUTO: 0.2 % (ref 0–0.5)
LEFT ATRIUM VOLUME INDEX: 25 ML/M2
LV EF 2D ECHO EST: 55 %
LYMPHOCYTES # BLD AUTO: 0.43 10*3/MM3 (ref 0.7–3.1)
LYMPHOCYTES NFR BLD AUTO: 4.9 % (ref 19.6–45.3)
MAXIMAL PREDICTED HEART RATE: 143 BPM
MCH RBC QN AUTO: 30.9 PG (ref 26.6–33)
MCHC RBC AUTO-ENTMCNC: 32.1 G/DL (ref 31.5–35.7)
MCV RBC AUTO: 96.5 FL (ref 79–97)
MONOCYTES # BLD AUTO: 0.74 10*3/MM3 (ref 0.1–0.9)
MONOCYTES NFR BLD AUTO: 8.4 % (ref 5–12)
NEUTROPHILS NFR BLD AUTO: 7.64 10*3/MM3 (ref 1.7–7)
NEUTROPHILS NFR BLD AUTO: 86.3 % (ref 42.7–76)
NRBC BLD AUTO-RTO: 0 /100 WBC (ref 0–0.2)
PLATELET # BLD AUTO: 137 10*3/MM3 (ref 140–450)
PMV BLD AUTO: 10 FL (ref 6–12)
POTASSIUM SERPL-SCNC: 4.3 MMOL/L (ref 3.5–5.2)
QT INTERVAL: 515 MS
RBC # BLD AUTO: 4.59 10*6/MM3 (ref 4.14–5.8)
SARS-COV-2 ORF1AB RESP QL NAA+PROBE: NOT DETECTED
SINUS: 3.3 CM
SODIUM SERPL-SCNC: 140 MMOL/L (ref 136–145)
STJ: 0.4 CM
STRESS TARGET HR: 122 BPM
TSH SERPL DL<=0.05 MIU/L-ACNC: 1.9 UIU/ML (ref 0.27–4.2)
WBC # BLD AUTO: 8.85 10*3/MM3 (ref 3.4–10.8)

## 2021-01-30 PROCEDURE — 25010000002 PERFLUTREN (DEFINITY) 8.476 MG IN SODIUM CHLORIDE (PF) 0.9 % 10 ML INJECTION: Performed by: HOSPITALIST

## 2021-01-30 PROCEDURE — 84443 ASSAY THYROID STIM HORMONE: CPT | Performed by: INTERNAL MEDICINE

## 2021-01-30 PROCEDURE — 99223 1ST HOSP IP/OBS HIGH 75: CPT | Performed by: INTERNAL MEDICINE

## 2021-01-30 PROCEDURE — 80048 BASIC METABOLIC PNL TOTAL CA: CPT | Performed by: HOSPITALIST

## 2021-01-30 PROCEDURE — 93306 TTE W/DOPPLER COMPLETE: CPT | Performed by: INTERNAL MEDICINE

## 2021-01-30 PROCEDURE — 93306 TTE W/DOPPLER COMPLETE: CPT

## 2021-01-30 PROCEDURE — 87040 BLOOD CULTURE FOR BACTERIA: CPT | Performed by: INTERNAL MEDICINE

## 2021-01-30 PROCEDURE — 85025 COMPLETE CBC W/AUTO DIFF WBC: CPT | Performed by: HOSPITALIST

## 2021-01-30 RX ORDER — MELATONIN
5000 DAILY
Status: DISCONTINUED | OUTPATIENT
Start: 2021-01-30 | End: 2021-01-30

## 2021-01-30 RX ORDER — DIVALPROEX SODIUM 250 MG/1
125 TABLET, DELAYED RELEASE ORAL 3 TIMES DAILY
Status: ON HOLD | COMMUNITY
End: 2022-01-01

## 2021-01-30 RX ORDER — FUROSEMIDE 20 MG/1
10 TABLET ORAL DAILY
COMMUNITY
End: 2021-02-01 | Stop reason: HOSPADM

## 2021-01-30 RX ORDER — DIVALPROEX SODIUM 125 MG/1
250 CAPSULE, COATED PELLETS ORAL EVERY 6 HOURS SCHEDULED
Status: DISCONTINUED | OUTPATIENT
Start: 2021-01-30 | End: 2021-01-30

## 2021-01-30 RX ORDER — FUROSEMIDE 20 MG/1
10 TABLET ORAL DAILY
Status: DISCONTINUED | OUTPATIENT
Start: 2021-01-30 | End: 2021-01-30

## 2021-01-30 RX ORDER — MEMANTINE HYDROCHLORIDE 10 MG/1
10 TABLET ORAL EVERY 12 HOURS SCHEDULED
Status: DISCONTINUED | OUTPATIENT
Start: 2021-01-30 | End: 2021-02-01 | Stop reason: HOSPADM

## 2021-01-30 RX ORDER — DONEPEZIL HYDROCHLORIDE 5 MG/1
5 TABLET, FILM COATED ORAL NIGHTLY
Status: DISCONTINUED | OUTPATIENT
Start: 2021-01-30 | End: 2021-02-01 | Stop reason: HOSPADM

## 2021-01-30 RX ORDER — MELATONIN
1000 DAILY
Status: DISCONTINUED | OUTPATIENT
Start: 2021-01-31 | End: 2021-01-30

## 2021-01-30 RX ORDER — MELATONIN
1000 ONCE
Status: COMPLETED | OUTPATIENT
Start: 2021-01-30 | End: 2021-01-30

## 2021-01-30 RX ORDER — CITALOPRAM 10 MG/1
10 TABLET ORAL NIGHTLY
Status: DISCONTINUED | OUTPATIENT
Start: 2021-01-30 | End: 2021-02-01 | Stop reason: HOSPADM

## 2021-01-30 RX ORDER — ONDANSETRON 2 MG/ML
4 INJECTION INTRAMUSCULAR; INTRAVENOUS EVERY 6 HOURS PRN
Status: DISCONTINUED | OUTPATIENT
Start: 2021-01-30 | End: 2021-02-01 | Stop reason: HOSPADM

## 2021-01-30 RX ORDER — ONDANSETRON 2 MG/ML
4 INJECTION INTRAMUSCULAR; INTRAVENOUS ONCE
Status: DISCONTINUED | OUTPATIENT
Start: 2021-01-30 | End: 2021-01-30

## 2021-01-30 RX ORDER — BUPROPION HYDROCHLORIDE 150 MG/1
150 TABLET ORAL DAILY
Status: DISCONTINUED | OUTPATIENT
Start: 2021-01-30 | End: 2021-01-30

## 2021-01-30 RX ORDER — MEMANTINE HYDROCHLORIDE 10 MG/1
10 TABLET ORAL 2 TIMES DAILY
COMMUNITY

## 2021-01-30 RX ORDER — ATORVASTATIN CALCIUM 20 MG/1
20 TABLET, FILM COATED ORAL NIGHTLY
Status: DISCONTINUED | OUTPATIENT
Start: 2021-01-30 | End: 2021-01-30

## 2021-01-30 RX ORDER — CHOLECALCIFEROL (VITAMIN D3) 125 MCG
1000 CAPSULE ORAL DAILY
Status: DISCONTINUED | OUTPATIENT
Start: 2021-01-30 | End: 2021-01-31

## 2021-01-30 RX ORDER — PANTOPRAZOLE SODIUM 40 MG/1
40 TABLET, DELAYED RELEASE ORAL DAILY
COMMUNITY

## 2021-01-30 RX ORDER — DIVALPROEX SODIUM 125 MG/1
250 CAPSULE, COATED PELLETS ORAL EVERY 8 HOURS SCHEDULED
Status: DISCONTINUED | OUTPATIENT
Start: 2021-01-30 | End: 2021-02-01 | Stop reason: HOSPADM

## 2021-01-30 RX ORDER — ONDANSETRON 2 MG/ML
4 INJECTION INTRAMUSCULAR; INTRAVENOUS EVERY 4 HOURS PRN
Status: DISCONTINUED | OUTPATIENT
Start: 2021-01-30 | End: 2021-01-30 | Stop reason: SDUPTHER

## 2021-01-30 RX ORDER — CITALOPRAM 20 MG/1
20 TABLET ORAL DAILY
Status: DISCONTINUED | OUTPATIENT
Start: 2021-01-30 | End: 2021-01-30

## 2021-01-30 RX ORDER — TRAZODONE HYDROCHLORIDE 50 MG/1
50 TABLET ORAL NIGHTLY
Status: DISCONTINUED | OUTPATIENT
Start: 2021-01-30 | End: 2021-01-30

## 2021-01-30 RX ORDER — PANTOPRAZOLE SODIUM 40 MG/1
40 TABLET, DELAYED RELEASE ORAL
Status: DISCONTINUED | OUTPATIENT
Start: 2021-01-30 | End: 2021-02-01 | Stop reason: HOSPADM

## 2021-01-30 RX ADMIN — MEMANTINE HYDROCHLORIDE 10 MG: 10 TABLET, FILM COATED ORAL at 21:54

## 2021-01-30 RX ADMIN — DONEPEZIL HYDROCHLORIDE 5 MG: 5 TABLET, FILM COATED ORAL at 21:54

## 2021-01-30 RX ADMIN — PERFLUTREN 3 ML: 6.52 INJECTION, SUSPENSION INTRAVENOUS at 16:00

## 2021-01-30 RX ADMIN — RIVAROXABAN 20 MG: 20 TABLET, FILM COATED ORAL at 17:07

## 2021-01-30 RX ADMIN — CITALOPRAM 10 MG: 20 TABLET, FILM COATED ORAL at 21:54

## 2021-01-30 RX ADMIN — PANTOPRAZOLE SODIUM 40 MG: 40 TABLET, DELAYED RELEASE ORAL at 13:32

## 2021-01-30 RX ADMIN — MEMANTINE HYDROCHLORIDE 10 MG: 10 TABLET, FILM COATED ORAL at 15:21

## 2021-01-30 RX ADMIN — CYANOCOBALAMIN TAB 500 MCG 1000 MCG: 500 TAB at 13:32

## 2021-01-30 RX ADMIN — VITAMIN D, TAB 1000IU (100/BT) 1000 UNITS: 25 TAB at 15:21

## 2021-01-30 RX ADMIN — DIVALPROEX SODIUM 250 MG: 125 CAPSULE, COATED PELLETS ORAL at 21:54

## 2021-01-31 LAB
25(OH)D3 SERPL-MCNC: 34.2 NG/ML (ref 30–100)
ALBUMIN SERPL-MCNC: 3 G/DL (ref 3.5–5.2)
ALBUMIN/GLOB SERPL: 1.9 G/DL
ALP SERPL-CCNC: 45 U/L (ref 39–117)
ALT SERPL W P-5'-P-CCNC: 9 U/L (ref 1–41)
ANION GAP SERPL CALCULATED.3IONS-SCNC: 6.6 MMOL/L (ref 5–15)
AST SERPL-CCNC: 12 U/L (ref 1–40)
BASOPHILS # BLD AUTO: 0.03 10*3/MM3 (ref 0–0.2)
BASOPHILS NFR BLD AUTO: 0.3 % (ref 0–1.5)
BILIRUB SERPL-MCNC: 0.6 MG/DL (ref 0–1.2)
BUN SERPL-MCNC: 20 MG/DL (ref 8–23)
BUN/CREAT SERPL: 19.8 (ref 7–25)
CALCIUM SPEC-SCNC: 8.2 MG/DL (ref 8.6–10.5)
CHLORIDE SERPL-SCNC: 101 MMOL/L (ref 98–107)
CHOLEST SERPL-MCNC: 112 MG/DL (ref 0–200)
CO2 SERPL-SCNC: 28.4 MMOL/L (ref 22–29)
CREAT SERPL-MCNC: 1.01 MG/DL (ref 0.76–1.27)
DEPRECATED RDW RBC AUTO: 46.7 FL (ref 37–54)
EOSINOPHIL # BLD AUTO: 0.09 10*3/MM3 (ref 0–0.4)
EOSINOPHIL NFR BLD AUTO: 0.9 % (ref 0.3–6.2)
ERYTHROCYTE [DISTWIDTH] IN BLOOD BY AUTOMATED COUNT: 13.7 % (ref 12.3–15.4)
GFR SERPL CREATININE-BSD FRML MDRD: 72 ML/MIN/1.73
GLOBULIN UR ELPH-MCNC: 1.6 GM/DL
GLUCOSE SERPL-MCNC: 88 MG/DL (ref 65–99)
HBA1C MFR BLD: 4.6 % (ref 4.8–5.6)
HCT VFR BLD AUTO: 37.3 % (ref 37.5–51)
HDLC SERPL-MCNC: 55 MG/DL (ref 40–60)
HGB BLD-MCNC: 12.8 G/DL (ref 13–17.7)
LDLC SERPL CALC-MCNC: 44 MG/DL (ref 0–100)
LDLC/HDLC SERPL: 0.82 {RATIO}
LYMPHOCYTES # BLD AUTO: 1.72 10*3/MM3 (ref 0.7–3.1)
LYMPHOCYTES NFR BLD AUTO: 17.3 % (ref 19.6–45.3)
MCH RBC QN AUTO: 32.2 PG (ref 26.6–33)
MCHC RBC AUTO-ENTMCNC: 34.3 G/DL (ref 31.5–35.7)
MCV RBC AUTO: 93.7 FL (ref 79–97)
MONOCYTES # BLD AUTO: 0.89 10*3/MM3 (ref 0.1–0.9)
MONOCYTES NFR BLD AUTO: 8.9 % (ref 5–12)
NEUTROPHILS NFR BLD AUTO: 7.2 10*3/MM3 (ref 1.7–7)
NEUTROPHILS NFR BLD AUTO: 72.2 % (ref 42.7–76)
NT-PROBNP SERPL-MCNC: 612.8 PG/ML (ref 0–1800)
PLATELET # BLD AUTO: 123 10*3/MM3 (ref 140–450)
PMV BLD AUTO: 9.8 FL (ref 6–12)
POTASSIUM SERPL-SCNC: 4 MMOL/L (ref 3.5–5.2)
PROT SERPL-MCNC: 4.6 G/DL (ref 6–8.5)
RBC # BLD AUTO: 3.98 10*6/MM3 (ref 4.14–5.8)
SODIUM SERPL-SCNC: 136 MMOL/L (ref 136–145)
TRIGL SERPL-MCNC: 59 MG/DL (ref 0–150)
VIT B12 BLD-MCNC: 1272 PG/ML (ref 211–946)
VLDLC SERPL-MCNC: 13 MG/DL (ref 5–40)
WBC # BLD AUTO: 9.97 10*3/MM3 (ref 3.4–10.8)

## 2021-01-31 PROCEDURE — 99232 SBSQ HOSP IP/OBS MODERATE 35: CPT | Performed by: NURSE PRACTITIONER

## 2021-01-31 PROCEDURE — 82607 VITAMIN B-12: CPT | Performed by: HOSPITALIST

## 2021-01-31 PROCEDURE — 83880 ASSAY OF NATRIURETIC PEPTIDE: CPT | Performed by: HOSPITALIST

## 2021-01-31 PROCEDURE — 82306 VITAMIN D 25 HYDROXY: CPT | Performed by: HOSPITALIST

## 2021-01-31 PROCEDURE — 83036 HEMOGLOBIN GLYCOSYLATED A1C: CPT | Performed by: HOSPITALIST

## 2021-01-31 PROCEDURE — 80061 LIPID PANEL: CPT | Performed by: HOSPITALIST

## 2021-01-31 PROCEDURE — 80053 COMPREHEN METABOLIC PANEL: CPT | Performed by: HOSPITALIST

## 2021-01-31 PROCEDURE — 85025 COMPLETE CBC W/AUTO DIFF WBC: CPT | Performed by: HOSPITALIST

## 2021-01-31 RX ORDER — MELATONIN
1000 ONCE
Status: DISCONTINUED | OUTPATIENT
Start: 2021-01-31 | End: 2021-01-31

## 2021-01-31 RX ADMIN — MEMANTINE HYDROCHLORIDE 10 MG: 10 TABLET, FILM COATED ORAL at 08:35

## 2021-01-31 RX ADMIN — PANTOPRAZOLE SODIUM 40 MG: 40 TABLET, DELAYED RELEASE ORAL at 06:53

## 2021-01-31 RX ADMIN — CITALOPRAM 10 MG: 20 TABLET, FILM COATED ORAL at 21:09

## 2021-01-31 RX ADMIN — RIVAROXABAN 20 MG: 20 TABLET, FILM COATED ORAL at 17:00

## 2021-01-31 RX ADMIN — DIVALPROEX SODIUM 250 MG: 125 CAPSULE, COATED PELLETS ORAL at 06:53

## 2021-01-31 RX ADMIN — DONEPEZIL HYDROCHLORIDE 5 MG: 5 TABLET, FILM COATED ORAL at 21:09

## 2021-01-31 RX ADMIN — MEMANTINE HYDROCHLORIDE 10 MG: 10 TABLET, FILM COATED ORAL at 21:09

## 2021-01-31 RX ADMIN — DIVALPROEX SODIUM 250 MG: 125 CAPSULE, COATED PELLETS ORAL at 21:09

## 2021-01-31 RX ADMIN — DIVALPROEX SODIUM 250 MG: 125 CAPSULE, COATED PELLETS ORAL at 14:22

## 2021-02-01 ENCOUNTER — APPOINTMENT (OUTPATIENT)
Dept: CARDIOLOGY | Facility: HOSPITAL | Age: 76
End: 2021-02-01

## 2021-02-01 VITALS
OXYGEN SATURATION: 96 % | DIASTOLIC BLOOD PRESSURE: 76 MMHG | BODY MASS INDEX: 23.1 KG/M2 | HEART RATE: 111 BPM | RESPIRATION RATE: 16 BRPM | SYSTOLIC BLOOD PRESSURE: 136 MMHG | HEIGHT: 74 IN | WEIGHT: 180 LBS | TEMPERATURE: 98.4 F

## 2021-02-01 LAB
ANION GAP SERPL CALCULATED.3IONS-SCNC: 8.5 MMOL/L (ref 5–15)
BASOPHILS # BLD AUTO: 0.03 10*3/MM3 (ref 0–0.2)
BASOPHILS NFR BLD AUTO: 0.4 % (ref 0–1.5)
BUN SERPL-MCNC: 15 MG/DL (ref 8–23)
BUN/CREAT SERPL: 15.8 (ref 7–25)
CALCIUM SPEC-SCNC: 8.6 MG/DL (ref 8.6–10.5)
CHLORIDE SERPL-SCNC: 102 MMOL/L (ref 98–107)
CO2 SERPL-SCNC: 26.5 MMOL/L (ref 22–29)
CREAT SERPL-MCNC: 0.95 MG/DL (ref 0.76–1.27)
DEPRECATED RDW RBC AUTO: 46.7 FL (ref 37–54)
EOSINOPHIL # BLD AUTO: 0.22 10*3/MM3 (ref 0–0.4)
EOSINOPHIL NFR BLD AUTO: 2.9 % (ref 0.3–6.2)
ERYTHROCYTE [DISTWIDTH] IN BLOOD BY AUTOMATED COUNT: 13.3 % (ref 12.3–15.4)
GFR SERPL CREATININE-BSD FRML MDRD: 77 ML/MIN/1.73
GLUCOSE SERPL-MCNC: 81 MG/DL (ref 65–99)
HCT VFR BLD AUTO: 40.5 % (ref 37.5–51)
HGB BLD-MCNC: 13.8 G/DL (ref 13–17.7)
LYMPHOCYTES # BLD AUTO: 1.65 10*3/MM3 (ref 0.7–3.1)
LYMPHOCYTES NFR BLD AUTO: 21.4 % (ref 19.6–45.3)
MCH RBC QN AUTO: 32.5 PG (ref 26.6–33)
MCHC RBC AUTO-ENTMCNC: 34.1 G/DL (ref 31.5–35.7)
MCV RBC AUTO: 95.3 FL (ref 79–97)
MONOCYTES # BLD AUTO: 0.63 10*3/MM3 (ref 0.1–0.9)
MONOCYTES NFR BLD AUTO: 8.2 % (ref 5–12)
NEUTROPHILS NFR BLD AUTO: 5.14 10*3/MM3 (ref 1.7–7)
NEUTROPHILS NFR BLD AUTO: 66.7 % (ref 42.7–76)
PLATELET # BLD AUTO: 145 10*3/MM3 (ref 140–450)
PMV BLD AUTO: 9.9 FL (ref 6–12)
POTASSIUM SERPL-SCNC: 3.9 MMOL/L (ref 3.5–5.2)
RBC # BLD AUTO: 4.25 10*6/MM3 (ref 4.14–5.8)
SODIUM SERPL-SCNC: 137 MMOL/L (ref 136–145)
WBC # BLD AUTO: 7.7 10*3/MM3 (ref 3.4–10.8)

## 2021-02-01 PROCEDURE — 99232 SBSQ HOSP IP/OBS MODERATE 35: CPT | Performed by: INTERNAL MEDICINE

## 2021-02-01 PROCEDURE — 97162 PT EVAL MOD COMPLEX 30 MIN: CPT

## 2021-02-01 PROCEDURE — 93246 EXT ECG>7D<15D RECORDING: CPT

## 2021-02-01 PROCEDURE — 97535 SELF CARE MNGMENT TRAINING: CPT | Performed by: OCCUPATIONAL THERAPIST

## 2021-02-01 PROCEDURE — 97110 THERAPEUTIC EXERCISES: CPT

## 2021-02-01 PROCEDURE — 97166 OT EVAL MOD COMPLEX 45 MIN: CPT | Performed by: OCCUPATIONAL THERAPIST

## 2021-02-01 PROCEDURE — 85025 COMPLETE CBC W/AUTO DIFF WBC: CPT | Performed by: HOSPITALIST

## 2021-02-01 PROCEDURE — 80048 BASIC METABOLIC PNL TOTAL CA: CPT | Performed by: HOSPITALIST

## 2021-02-01 RX ORDER — CITALOPRAM 10 MG/1
10 TABLET ORAL NIGHTLY
Qty: 30 TABLET | Refills: 0 | Status: SHIPPED | OUTPATIENT
Start: 2021-02-01 | End: 2022-01-01

## 2021-02-01 RX ADMIN — DIVALPROEX SODIUM 250 MG: 125 CAPSULE, COATED PELLETS ORAL at 14:18

## 2021-02-01 RX ADMIN — DIVALPROEX SODIUM 250 MG: 125 CAPSULE, COATED PELLETS ORAL at 06:04

## 2021-02-01 RX ADMIN — MEMANTINE HYDROCHLORIDE 10 MG: 10 TABLET, FILM COATED ORAL at 08:46

## 2021-02-01 RX ADMIN — PANTOPRAZOLE SODIUM 40 MG: 40 TABLET, DELAYED RELEASE ORAL at 06:04

## 2021-02-01 NOTE — THERAPY EVALUATION
Patient Name: Harsha Mejia  : 1945    MRN: [id number]                              Today's Date: 2021       Admit Date: 2021    Visit Dx:     ICD-10-CM ICD-9-CM   1. Syncope, unspecified syncope type  R55 780.2   2. Bradycardia  R00.1 427.89     Patient Active Problem List   Diagnosis   • Closed displaced fracture of right femoral neck (CMS/[facility])   • Syncope     Past Medical History:   Diagnosis Date   • Arthritis     osteoarthritis   • Dementia    • DVT (deep venous thrombosis) (CMS/[facility])    • Hyperlipidemia      Past Surgical History:   Procedure Laterality Date   • HIP PERCUTANEOUS PINNING Right 2018    Procedure: CLOSED REDUCTION AND PINNING RIGHT HIP FRACTURE;  Surgeon: Saqib Castle Jr., MD;  Location: [facility];  Service: Orthopedics     General Information     Row Name 21 1250          Physical Therapy Time and Intention    Document Type  evaluation  -AR     Mode of Treatment  physical therapy  -AR     Row Name 21 1250          General Information    Patient Profile Reviewed  yes  -AR     Prior Level of Function  -- from assisted living per chart, unable to obtain any PLOF info 2/2 pts cognition/dementia  -AR     Existing Precautions/Restrictions  fall  -AR     Row Name 21 1250          Living Environment    Lives With  facility resident  -AR     Row Name 21 1250          Home Main Entrance    Number of Stairs, Main Entrance  none  -AR     Stair Railings, Main Entrance  none  -AR     Row Name 21 1250          Cognition    Orientation Status (Cognition)  oriented to;person;unable/difficult to assess  -AR     Row Name 21 1250          Safety Issues, Functional Mobility    Impairments Affecting Function (Mobility)  balance;cognition;endurance/activity tolerance;pain;strength  -AR       User Key  (r) = Recorded By, (t) = Taken By, (c) = Cosigned By    Initials Name Provider Type    AR Belgica Ann PT Physical Therapist        Mobility     Row  Name 02/01/21 1251          Bed Mobility    Bed Mobility  supine-sit;sit-supine  -AR     Supine-Sit [place] (Bed Mobility)  not tested  -AR     Sit-Supine [place] (Bed Mobility)  not tested  -AR     Row Name 02/01/21 1251          Sit-Stand Transfer    Sit-Stand [place] (Transfers)  contact guard  -AR     Assistive Device (Sit-Stand Transfers)  walker, front-wheeled  -AR     Row Name 02/01/21 1251          Gait/Stairs (Locomotion)    Wilkinson Level (Gait)  contact guard  -AR     Assistive Device (Gait)  walker, front-wheeled  -AR     Distance in Feet (Gait)  70' slow shuffling steps /w cues for posture  -AR     Deviations/Abnormal Patterns (Gait)  baudilio decreased;festinating/shuffling  -AR     Bilateral Gait Deviations  heel strike decreased;forward flexed posture  -AR       User Key  (r) = Recorded By, (t) = Taken By, (c) = Cosigned By    Initials Name Provider Type    AR Belgica Ann, PT Physical Therapist        Obj/Interventions     Row Name 02/01/21 1252          Range of Motion Comprehensive    Comment, General Range of Motion  B LE WFL  -AR     Row Name 02/01/21 1252          Strength Comprehensive (MMT)    Comment, General Manual Muscle Testing (MMT) Assessment  B LE 4/5 during mobility, not following instructions for MMT  -AR     Row Name 02/01/21 1252          Motor Skills    Therapeutic Exercise  -- annie YEE 10x  -AR     Row Name 02/01/21 1252          Balance    Balance Assessment  sitting static balance;standing dynamic balance  -AR     Static Sitting Balance  WNL  -AR     Dynamic Standing Balance  moderate impairment  -AR       User Key  (r) = Recorded By, (t) = Taken By, (c) = Cosigned By    Initials Name Provider Type    AR Belgica Ann, PT Physical Therapist        Goals/Plan     Row Name 02/01/21 1255          Bed Mobility Goal 1 (PT)    Activity/Assistive Device (Bed Mobility Goal 1, PT)  sit to supine/supine to sit  -AR     [place] Level/Cues Needed (Bed  Mobility Goal 1, PT)  supervision required  -AR     Time Frame (Bed Mobility Goal 1, PT)  1 week  -AR     Row Name 02/01/21 1255          Transfer Goal 1 (PT)    Activity/Assistive Device (Transfer Goal 1, PT)  sit-to-stand/stand-to-sit;walker, rolling  -AR     [place] Level/Cues Needed (Transfer Goal 1, PT)  standby assist  -AR     Time Frame (Transfer Goal 1, PT)  1 week  -AR     Row Name 02/01/21 1253          Gait Training Goal 1 (PT)    Activity/Assistive Device (Gait Training Goal 1, PT)  gait (walking locomotion);walker, rolling  -AR     [place] Level (Gait Training Goal 1, PT)  standby assist  -AR     Distance (Gait Training Goal 1, PT)  150  -AR     Time Frame (Gait Training Goal 1, PT)  1 week  -AR       User Key  (r) = Recorded By, (t) = Taken By, (c) = Cosigned By    Initials Name Provider Type    AR Belgica Ann, PT Physical Therapist        Clinical Impression     Row Name 02/01/21 8596          Pain    Additional Documentation  Pain Scale: Numbers Pre/Post-Treatment (Group)  -AR     Row Name 02/01/21 4557          Pain Scale: Numbers Pre/Post-Treatment    Pretreatment Pain Rating  0/10 - no pain  -AR     Posttreatment Pain Rating  0/10 - no pain  -AR     Pre/Posttreatment Pain Comment  does notappear in any pain/distress, does not answer question about pain  -AR     Pain Intervention(s)  Repositioned  -AR     Row Name 02/01/21 1255          Plan of Care Review    Plan of Care Reviewed With  patient  -AR     Outcome Summary  Pt admitted from ?assisted living after syncope with fall; h/o dementia.  Unable to obtain any PLOF information 2/2 dementia/cognition but pt demonstrates generalized waekness, impaired balance and ind. w/ mobility but able to ambulate 70' w/ contact assist using RW; shuffling steps w/ forward flexed posutre.  may benefit from DC to SNU or back to ?assisted living depending on level of assist available.  -AR     Row Name 02/01/21 3372          Therapy Assessment/Plan  (PT)    Rehab Potential (PT)  fair, will monitor progress closely  -AR     Criteria for Skilled Interventions Met (PT)  yes  -AR     Row Name 02/01/21 1252          Vital Signs    Pre Systolic BP Rehab  130  -AR     Pre Treatment Diastolic BP  74  -AR     Post Systolic BP Rehab  135  -AR     Post Treatment Diastolic BP  71  -AR     O2 Delivery Pre Treatment  room air  -AR     Row Name 02/01/21 1252          Positioning and Restraints    Pre-Treatment Position  sitting in chair/recliner  -AR     Post Treatment Position  chair  -AR     In Chair  reclined;notified nsg;sitting;call light within reach;encouraged to call for assist;exit alarm on  -AR       User Key  (r) = Recorded By, (t) = Taken By, (c) = Cosigned By    Initials Name Provider Type    Belgica Carias, PT Physical Therapist        Outcome Measures     Row Name 02/01/21 1255          How much help from another person do you currently need...    Turning from your back to your side while in flat bed without using bedrails?  3  -AR     Moving from lying on back to sitting on the side of a flat bed without bedrails?  3  -AR     Moving to and from a bed to a chair (including a wheelchair)?  3  -AR     Standing up from a chair using your arms (e.g., wheelchair, bedside chair)?  3  -AR     Climbing 3-5 steps with a railing?  2  -AR     To walk in hospital room?  3  -AR     AM-PAC 6 Clicks Score (PT)  17  -AR     Row Name 02/01/21 1255          Functional Assessment    Outcome Measure Options  AM-PAC 6 Clicks Basic Mobility (PT)  -AR       User Key  (r) = Recorded By, (t) = Taken By, (c) = Cosigned By    Initials Name Provider Type    Belgica Carias, PT Physical Therapist        Physical Therapy Education                 Title: PT OT SLP Therapies (In Progress)     Topic: Physical Therapy (In Progress)     Point: Mobility training (In Progress)     Learning Progress Summary           Patient Acceptance, E, NR by AR at 2/1/2021 1256                   Point:  Home exercise program (In Progress)     Learning Progress Summary           Patient Acceptance, E, NR by AR at 2/1/2021 1256                   Point: Body mechanics (In Progress)     Learning Progress Summary           Patient Acceptance, E, NR by AR at 2/1/2021 1256                   Point: Precautions (In Progress)     Learning Progress Summary           Patient Acceptance, E, NR by AR at 2/1/2021 1256                               User Key     Initials Effective Dates Name Provider Type Discipline    AR 04/03/18 -  Belgica Ann PT Physical Therapist PT              PT Recommendation and Plan  Planned Therapy Interventions (PT): balance training, bed mobility training, gait training, home exercise program, patient/family education, transfer training, ROM (range of motion), stair training  Plan of Care Reviewed With: patient  Outcome Summary: Pt admitted from ?assisted living after syncope with fall; h/o dementia.  Unable to obtain any PLOF information 2/2 dementia/cognition but pt demonstrates generalized waekness, impaired balance and ind. w/ mobility but able to ambulate 70' w/ contact assist using RW; shuffling steps w/ forward flexed posutre.  may benefit from DC to SNU or back to ?assisted living depending on level of assist available.     Time Calculation:   PT Charges     Row Name 02/01/21 1249             Time Calculation    Start Time  0823  -AR      Stop Time  0841  -AR      Time Calculation (min)  18 min  -AR      PT Received On  02/01/21  -AR      PT - Next Appointment  02/02/21  -AR      PT Goal Re-Cert Due Date  02/08/21  -AR        User Key  (r) = Recorded By, (t) = Taken By, (c) = Cosigned By    Initials Name Provider Type    AR Belgica Ann PT Physical Therapist        Therapy Charges for Today     Code Description Service Date Service Provider Modifiers Qty    79096049871 HC PT EVAL MOD COMPLEXITY 2 2/1/2021 Belgica Ann, PT GP 1    00408849706 HC PT THER PROC EA 15 MIN 2/1/2021  Belgica Ann, PT GP 1    72515462039 HC PT THER SUPP EA 15 MIN 2/1/2021 Belgica Ann, PT GP 2          PT G-Codes  Outcome Measure Options: AM-PAC 6 Clicks Basic Mobility (PT)  AM-PAC 6 Clicks Score (PT): 17  AM-PAC 6 Clicks Score (OT): 18    Belgica Ann PT  2/1/2021

## 2021-02-01 NOTE — PLAN OF CARE
Goal Outcome Evaluation:  Plan of Care Reviewed With: patient     Outcome Summary: Pt admitted from ?assisted living after syncope with fall; h/o dementia.  Unable to obtain any PLOF information 2/2 dementia/cognition but pt demonstrates generalized waekness, impaired balance and ind. w/ mobility but able to ambulate 70' w/ contact assist using RW; shuffling steps w/ forward flexed posutre.  may benefit from DC to SNU or back to ?assisted living depending on level of assist available.    ..Patient was intermittently wearing a face mask during this therapy encounter. Therapist used appropriate personal protective equipment including eye protection, mask, and gloves.  Mask used was standard procedure mask. Appropriate PPE was worn during the entire therapy session. Hand hygiene was completed before and after therapy session. Patient is not in enhanced droplet precautions. Ines present.

## 2021-02-01 NOTE — THERAPY EVALUATION
Acute Care - Occupational Therapy Initial Evaluation  [facility]     Patient Name: Harsha Mejia  : 1945  MRN: [id number]  Today's Date: 2021             Admit Date: 2021       ICD-10-CM ICD-9-CM   1. Syncope, unspecified syncope type  R55 780.2   2. Bradycardia  R00.1 427.89     Patient Active Problem List   Diagnosis   • Closed displaced fracture of right femoral neck (CMS/[facility])   • Syncope     Past Medical History:   Diagnosis Date   • Arthritis     osteoarthritis   • Dementia    • DVT (deep venous thrombosis) (CMS/[facility])    • Hyperlipidemia      Past Surgical History:   Procedure Laterality Date   • HIP PERCUTANEOUS PINNING Right 2018    Procedure: CLOSED REDUCTION AND PINNING RIGHT HIP FRACTURE;  Surgeon: Saqib Castle Jr., MD;  Location: [facility] OR;  Service: Orthopedics            OT ASSESSMENT FLOWSHEET (last 12 hours)      OT Evaluation and Treatment     Row Name 21 1000                   OT Time and Intention    Subjective Information  no complaints  -SG        Document Type  evaluation  -SG        Mode of Treatment  individual therapy;occupational therapy  -SG        Patient Effort  good  -SG        Symptoms Noted During/After Treatment  none  -SG           General Information    Patient Profile Reviewed  yes  -SG        General Observations of Patient  Pt sitting up in chair  -SG        Prior Level of Function  -- Unsure of level of assist due to cognition  -SG        Equipment Currently Used at Home  none  -SG        Existing Precautions/Restrictions  fall  -SG        Limitations/Impairments  safety/cognitive  -SG        Barriers to Rehab  previous functional deficit;cognitive status  -SG           Living Environment    Lives With  facility resident  -SG           Cognition    Affect/Mental Status (Cognitive)  confused  -SG        Orientation Status (Cognition)  oriented to;person  -SG        Follows Commands (Cognition)  follows one-step commands;50-74% accuracy  -SG            Range of Motion Comprehensive    Comment, General Range of Motion  BUE WFL  -SG           Strength Comprehensive (MMT)    Comment, General Manual Muscle Testing (MMT) Assessment  BUE 4/5  -SG           Bed Mobility    Comment (Bed Mobility)  Sitting up in chair  -SG           Functional Mobility    Functional Mobility- Ind. Level  contact guard assist  -SG        Functional Mobility- Device  rolling walker  -SG        Functional Mobility- Comment  Walks to bathroom, vcing for walker navigation  -SG           Transfer Assessment/Treatment    Transfers  sit-stand transfer;stand-sit transfer  -SG           Transfers    Sit-Stand Boykins (Transfers)  contact guard;verbal cues  -SG        Stand-Sit Boykins (Transfers)  contact guard;verbal cues  -SG           Balance    Balance Assessment  standing static balance  -SG        Static Standing Balance  mild impairment  -SG        Comment, Balance  Small LOB at sink level during grooming  -SG           Activities of Daily Living    BADL Assessment/Intervention  lower body dressing;grooming  -SG           Lower Body Dressing Assessment/Training    Boykins Level (Lower Body Dressing)  doff;don;socks;verbal cues;nonverbal cues (demo/gesture);contact guard assist  -SG        Position (Lower Body Dressing)  supported sitting  -SG           Grooming Assessment/Training    Boykins Level (Grooming)  grooming skills;hair care, combing/brushing;oral care regimen;wash face, hands;contact guard assist;verbal cues  -SG        Position (Grooming)  sink side;supported standing  -SG           Plan of Care Review    Plan of Care Reviewed With  patient  -SG        Outcome Summary  Pt seen today for OT eval following syncopal episode at SNF with fall. Unsure of pt prior level of function, however this date pt requires CGA and vcing throughout all ADLs. He did demo some mild balance impairments and endurance deficits and would benefit from OT to address deficits. D/c  back to SNF.  -SG           OT Goals    Transfer Goal Selection (OT)  transfer, OT goal 1  -SG        Balance Goal Selection (OT)  balance, OT goal 1  -SG        Problem Specific Goal Selection (OT)  problem specific goal 1, OT  -SG           Transfer Goal 1 (OT)    Activity/Assistive Device (Transfer Goal 1, OT)  sit-to-stand/stand-to-sit;toilet  -SG        Sully Level/Cues Needed (Transfer Goal 1, OT)  standby assist  -SG        Time Frame (Transfer Goal 1, OT)  short term goal (STG);2 weeks  -SG        Progress/Outcome (Transfer Goal 1, OT)  goal ongoing  -SG           Balance Goal 1 (OT)    Activity/Assistive Device (Balance Goal 1, OT)  standing, static;standing, dynamic  -SG        Sully Level/Cues Needed (Balance Goal 1, OT)  standby assist  -SG        Time Frame (Balance Goal 1, OT)  short term goal (STG);2 weeks  -SG        Progress/Outcomes (Balance Goal 1, OT)  goal ongoing  -SG           Problem Specific Goal 1 (OT)    Problem Specific Goal 1 (OT)  Pt to perform ADLs with SBA with vcing as needed.  -SG        Time Frame (Problem Specific Goal 1, OT)  short term goal (STG);2 weeks  -SG        Progress/Outcome (Problem Specific Goal 1, OT)  goal ongoing  -SG           Positioning and Restraints    Pre-Treatment Position  sitting in chair/recliner  -SG        Post Treatment Position  chair  -SG        In Chair  sitting;call light within reach;encouraged to call for assist;exit alarm on  -SG           Therapy Assessment/Plan (OT)    Rehab Potential (OT)  good, to achieve stated therapy goals  -SG        Criteria for Skilled Therapeutic Interventions Met (OT)  yes  -SG        Therapy Frequency (OT)  3 times/wk  -SG        Problem List (OT)  balance;cognition;strength  -SG           Evaluation Complexity (OT)    Overall Complexity of Evaluation (OT)  moderate complexity  -SG           Therapy Plan Review/Discharge Plan (OT)    Therapy Plan Review (OT)  evaluation/treatment results reviewed;care  plan/treatment goals reviewed;patient  -SG        Anticipated Discharge Disposition (OT)  skilled nursing facility  -          User Key  (r) = Recorded By, (t) = Taken By, (c) = Cosigned By    Initials Name Effective Dates     Sandrita Merrill OTR 12/26/18 -          Occupational Therapy Education                 Title: PT OT SLP Therapies (In Progress)     Topic: Occupational Therapy (In Progress)     Point: ADL training (In Progress)     Description:   Instruct learner(s) on proper safety adaptation and remediation techniques during self care or transfers.   Instruct in proper use of assistive devices.              Learning Progress Summary           Patient Acceptance, E,TB, NR,NL by  at 2/1/2021 1043    Comment: OT role and goals                   Point: Home exercise program (Not Started)     Description:   Instruct learner(s) on appropriate technique for monitoring, assisting and/or progressing therapeutic exercises/activities.              Learner Progress:  Not documented in this visit.          Point: Precautions (Not Started)     Description:   Instruct learner(s) on prescribed precautions during self-care and functional transfers.              Learner Progress:  Not documented in this visit.          Point: Body mechanics (Not Started)     Description:   Instruct learner(s) on proper positioning and spine alignment during self-care, functional mobility activities and/or exercises.              Learner Progress:  Not documented in this visit.                      User Key     Initials Effective Dates Name Provider Type Discipline     12/26/18 -  Sandrita Merrill OTR Occupational Therapist OT                  OT Recommendation and Plan  Therapy Frequency (OT): 3 times/wk  Plan of Care Review  Plan of Care Reviewed With: patient  Outcome Summary: Pt seen today for OT eval following syncopal episode at SNF with fall. Unsure of pt prior level of function, however this date pt requires CGA and vcing  throughout all ADLs. He did demo some mild balance impairments and endurance deficits and would benefit from OT to address deficits. D/c back to SNF.  Plan of Care Reviewed With: patient  Outcome Summary: Pt seen today for OT eval following syncopal episode at SNF with fall. Unsure of pt prior level of function, however this date pt requires CGA and vcing throughout all ADLs. He did demo some mild balance impairments and endurance deficits and would benefit from OT to address deficits. D/c back to SNF.    Outcome Measures     Row Name 02/01/21 1000             How much help from another is currently needed...    Putting on and taking off regular lower body clothing?  3  -SG      Bathing (including washing, rinsing, and drying)  3  -SG      Toileting (which includes using toilet bed pan or urinal)  3  -SG      Putting on and taking off regular upper body clothing  3  -SG      Taking care of personal grooming (such as brushing teeth)  3  -SG      Eating meals  3  -SG      AM-PAC 6 Clicks Score (OT)  18  -SG         Functional Assessment    Outcome Measure Options  AM-PAC 6 Clicks Daily Activity (OT)  -        User Key  (r) = Recorded By, (t) = Taken By, (c) = Cosigned By    Initials Name Provider Type    Sandrita Garcia OTR Occupational Therapist          Time Calculation:   Time Calculation- OT     Row Name 02/01/21 1044             Time Calculation- OT    OT Start Time  0857  -      OT Stop Time  0912  -      OT Time Calculation (min)  15 min  -      Total Timed Code Minutes- OT  8 minute(s)  -      OT Received On  02/01/21  -      OT - Next Appointment  02/02/21  -      OT Goal Re-Cert Due Date  02/15/21  -        User Key  (r) = Recorded By, (t) = Taken By, (c) = Cosigned By    Initials Name Provider Type    Sandrita Garcia OTR Occupational Therapist        Therapy Charges for Today     Code Description Service Date Service Provider Modifiers Qty    00517938262 HC OT EVAL MOD COMPLEXITY 2  2/1/2021 Sandrita Merrill OTR GO 1    16479843685 HC OT SELF CARE/MGMT/TRAIN EA 15 MIN 2/1/2021 Sandrita Merrill OTR GO 1               LAUREN Gomez  2/1/2021

## 2021-02-01 NOTE — DISCHARGE SUMMARY
Discharge summary    Date of admission 1/29/2021  Date of discharge 2/1/2021    Final diagnosis  Severe bradycardia improved  Syncopal episode secondary to bradycardia  Severe dementia  Depression  DVT on Xarelto  Osteoarthritis  Gastroesophageal reflux disease    Discharge medications    Current Facility-Administered Medications:   •  citalopram (CeleXA) tablet 10 mg, 10 mg, Oral, Nightly, Eb Avery MD, 10 mg at 01/31/21 2109  •  Divalproex Sodium (DEPAKOTE SPRINKLE) capsule 250 mg, 250 mg, Oral, Q8H, Eb Avery MD, 250 mg at 02/01/21 0604  •  donepezil (ARICEPT) tablet 5 mg, 5 mg, Oral, Nightly, Eb Avery MD, 5 mg at 01/31/21 2109  •  memantine (NAMENDA) tablet 10 mg, 10 mg, Oral, Q12H, Eb Avery MD, 10 mg at 02/01/21 0846  •  ondansetron (ZOFRAN) injection 4 mg, 4 mg, Intravenous, Q6H PRN, Eb Avery MD  •  pantoprazole (PROTONIX) EC tablet 40 mg, 40 mg, Oral, Q AM, Eb Avery MD, 40 mg at 02/01/21 0604  •  rivaroxaban (XARELTO) tablet 20 mg, 20 mg, Oral, Daily With Dinner, Eb Avery MD, 20 mg at 01/31/21 1700  •  [COMPLETED] Insert peripheral IV, , , Once **AND** sodium chloride 0.9 % flush 10 mL, 10 mL, Intravenous, PRN, Zion Capps MD     Consults obtained  Cardiology    Procedures  None    Hospital course  75-year white male with history of severe dementia depression DVT on Xarelto and gastroesophageal disease admitted to emergency room with syncopal episode.  Patient work-up in ER revealed severe bradycardia admit for management.  Patient admitted monitor and followed by cardiology.  Patient heart rate is now in 50s and 60s and has no more syncopal episode.  Patient medication adjusted during this hospitalization.  Patient will be discharged back to subacute rehab.    Discharge diet regular    Activity per PT OT    Medication as above    Further care per accepting physician at subacute rehab    EB AVERY MD

## 2021-02-01 NOTE — PROGRESS NOTES
Discharge Planning Assessment  [facility]     Patient Name: Harsha Mejia  MRN: [id number]  Today's Date: 2/1/2021    Admit Date: 1/29/2021    Discharge Needs Assessment     Row Name 02/01/21 1242       Living Environment    Lives With  facility resident    Name([facility]) [facility]    Current Living Arrangements  extended care facility    Duration at Residence  Since March 2020    Provides Primary Care For  no one, unable/limited ability to care for self    Family Caregiver if Needed  child(daniel), adult    Family Caregiver Names  Abdias Mejia, son    Quality of Family Relationships  supportive;involved    Able to Return to Prior Arrangements  yes       Resource/Environmental Concerns    Resource/Environmental Concerns  none       Transition Planning    Patient/Family Anticipates Transition to  other (see comments);long-term care facility assisted living memory care    Patient/Family Anticipated Services at Transition  none    Transportation Anticipated  other (see comments) [employer] wheelchair van       Discharge Needs Assessment    Readmission Within the Last 30 Days  no previous admission in last 30 days    Equipment Currently Used at Home  walker, rolling    Concerns to be Addressed  discharge planning    Anticipated Changes Related to Illness  none    Equipment Needed After Discharge  other (see comments) cardiology plans to order a ZIO PATCH at d/c    Discharge Facility/Level of Care Needs  assisted living facility    Provided Post Acute Provider List?  N/A    N/A Provider List Comment  Son declined need for list        Discharge Plan     Row Name 02/01/21 6981       Plan    Plan  2/1- Return home to Assisted Living at [facility] via [facility] wheelchair van (scheduled for 5PM today).    Plan Comments  CCP spoke with Pt son/Guardian, Abdias Mejia ([phone]) via phone to complete Pt screening.  Pt has dementia.  CCP introduced self and role.  Abdias  confirmed information on Pt face sheet.  Abdias advised Pt lives in Assisted Living on memory care unit at [facility].  Abdias reports, Pt uses a rolling walker when ambulating, “when he feels like it.”  Son confirmed Pt PCP is through [place].  CCP confirmed with [facility] ([phone]) that they use [facility] and Pt PCP is Susannah Quinones.  Pt medications and meals are provided by facility.  Pt has been to [facility] for past sub-acute rehab.  Abdias, son advised Pt d/c plan is to return home to assisted living facility via wheelchair van.  Abdias advised he will private pay for Pt to transport via w/c van.  CCP called [facility] [employer] ([phone]) and spoke with Darin at 12:35 PM and scheduled Pt w/c van  for 5:00 PM this afternoon (confirmation # [id number]).  Jennie/[facility] ([phone]), request that RN call her to give short report prior to Pt discharging.  CCP informed Mary BRIGGS RN.  CCP will continue to follow…URIEL MUNOZ/CCP        Continued Care and Services - Admitted Since 1/29/2021    Coordination has not been started for this encounter.         Demographic Summary     Row Name 02/01/21 1240       General Information    Admission Type  inpatient    Arrived From  emergency department    Required Notices Provided  Important Message from Medicare    Referral Source  admission list;physician    Reason for Consult  discharge planning    Preferred Language  English     Used During This Interaction  no        Functional Status     Row Name 02/01/21 1240       Functional Status    Usual Activity Tolerance  moderate    Current Activity Tolerance  moderate       Functional Status, IADL    Medications  assistive person    Meal Preparation  assistive person    Housekeeping  assistive person    Laundry  assistive person    Shopping  assistive person    IADL Comments  Pt lives in assisted living facility memory care unit        Mental Status    General Appearance WDL  WDL       Mental Status Summary    Recent Changes in Mental Status/Cognitive Functioning  no changes    Mental Status Comments  Pt has dementia       Employment/    Employment Status  retired        Psychosocial    No documentation.       Abuse/Neglect    No documentation.       Legal    No documentation.       Substance Abuse    No documentation.       Patient Forms    No documentation.           Alyson Carlosn RN

## 2021-02-01 NOTE — PLAN OF CARE
Goal Outcome Evaluation:  Plan of Care Reviewed With: patient     Outcome Summary: Pt seen today for OT eval following syncopal episode at SNF with fall. Unsure of pt prior level of function, however this date pt requires CGA and vcing throughout all ADLs. He did demo some mild balance impairments and endurance deficits and would benefit from OT to address deficits. D/c back to SNF.    Patient was not wearing a face mask during this therapy encounter. Therapist used appropriate personal protective equipment including mask, gloves, and eye protection.  Mask used was standard procedure mask. Appropriate PPE was worn during the entire therapy session. Hand hygiene was completed before and after therapy session. Patient is not in enhanced droplet precautions.

## 2021-02-01 NOTE — PROGRESS NOTES
"Daily progress note    Chief complaint  Doing better  Awake and alert  No new complaints    History of present illness  75-year-old white male who is well-known to our service with history of dementia depression DVT hyperlipidemia osteoarthritis and also had bradycardia in the past presented to [facility] emergency room with syncopal episode and work-up revealed heart rate of 30s admit for management.  Patient is demented unable to give detailed history but in no respiratory stress or chest pain fever cough abdominal pain nausea vomiting diarrhea.  Patient heart rate remained in 40s.     REVIEW OF SYSTEMS  Unable to obtain     PHYSICAL EXAM   Blood pressure 137/80, pulse 54, temperature 98.3 °F (36.8 °C), temperature source Oral, resp. rate 16, height 188 cm (74\"), weight 81.6 kg (180 lb), SpO2 96 %.    GENERAL: Awake, alert, intermittently vomiting, oriented to person  SKIN: Warm, dry  HENT: Normocephalic, small abrasion to his left forehead  EYES: no scleral icterus  CV: regular rhythm, bradycardic  RESPIRATORY: normal effort, lungs clear  ABDOMEN: soft, non-tender, non-distended bowel sounds positive  MUSCULOSKELETAL: No cervical spine tenderness.  No thoracic or lumbar spine tenderness.  No tenderness to the extremities.  NEURO: alert, moves all extremities, follows commands     LAB RESULTS  Lab Results (last 24 hours)     Procedure Component Value Units Date/Time    Basic Metabolic Panel [[id number]]  (Normal) Collected: 02/01/21 0340    Specimen: Blood Updated: 02/01/21 0436     Glucose 81 mg/dL      BUN 15 mg/dL      Creatinine 0.95 mg/dL      Sodium 137 mmol/L      Potassium 3.9 mmol/L      Chloride 102 mmol/L      CO2 26.5 mmol/L      Calcium 8.6 mg/dL      eGFR Non African Amer 77 mL/min/1.73      BUN/Creatinine Ratio 15.8     Anion Gap 8.5 mmol/L     Narrative:      GFR Normal >60  Chronic Kidney Disease <60  Kidney Failure <15      CBC & Differential [[id number]]  (Normal) Collected: 02/01/21 0340    " Specimen: Blood Updated: 02/01/21 0421    Narrative:      The following orders were created for panel order CBC & Differential.  Procedure                               Abnormality         Status                     ---------                               -----------         ------                     CBC Auto Differential[[id number]]        Normal              Final result                 Please view results for these tests on the individual orders.    CBC Auto Differential [[id number]]  (Normal) Collected: 02/01/21 0340    Specimen: Blood Updated: 02/01/21 0421     WBC 7.70 10*3/mm3      RBC 4.25 10*6/mm3      Hemoglobin 13.8 g/dL      Hematocrit 40.5 %      MCV 95.3 fL      MCH 32.5 pg      MCHC 34.1 g/dL      RDW 13.3 %      RDW-SD 46.7 fl      MPV 9.9 fL      Platelets 145 10*3/mm3      Neutrophil % 66.7 %      Lymphocyte % 21.4 %      Monocyte % 8.2 %      Eosinophil % 2.9 %      Basophil % 0.4 %      Neutrophils, Absolute 5.14 10*3/mm3      Lymphocytes, Absolute 1.65 10*3/mm3      Monocytes, Absolute 0.63 10*3/mm3      Eosinophils, Absolute 0.22 10*3/mm3      Basophils, Absolute 0.03 10*3/mm3     Blood Culture - Blood, Arm, Left [[id number]] Collected: 01/30/21 1757    Specimen: Blood from Arm, Left Updated: 01/31/21 1815     Blood Culture No growth at 24 hours    Blood Culture - Blood, Wrist, Left [[id number]] Collected: 01/30/21 1757    Specimen: Blood from Wrist, Left Updated: 01/31/21 1815     Blood Culture No growth at 24 hours        Imaging Results (Last 24 Hours)     ** No results found for the last 24 hours. **           ECG 12 Lead               HEART RATE= 42  bpm  RR Interval= 1440  ms  NC Interval= 196  ms  P Horizontal Axis= -3  deg  P Front Axis= 55  deg  QRSD Interval= 100  ms  QT Interval= 515  ms  QRS Axis= -37  deg  T Wave Axis= 49  deg  - OTHERWISE NORMAL ECG -  Sinus bradycardia  Left axis deviation             Current Facility-Administered Medications:   •  citalopram (CeleXA) tablet 10 mg,  10 mg, Oral, Nightly, Eb Avery MD, 10 mg at 01/31/21 2109  •  Divalproex Sodium (DEPAKOTE SPRINKLE) capsule 250 mg, 250 mg, Oral, Q8H, Eb Avery MD, 250 mg at 02/01/21 0604  •  donepezil (ARICEPT) tablet 5 mg, 5 mg, Oral, Nightly, Eb Avery MD, 5 mg at 01/31/21 2109  •  memantine (NAMENDA) tablet 10 mg, 10 mg, Oral, Q12H, Eb Avery MD, 10 mg at 02/01/21 0846  •  ondansetron (ZOFRAN) injection 4 mg, 4 mg, Intravenous, Q6H PRN, Eb Avery MD  •  pantoprazole (PROTONIX) EC tablet 40 mg, 40 mg, Oral, Q AM, Eb Avery MD, 40 mg at 02/01/21 0604  •  rivaroxaban (XARELTO) tablet 20 mg, 20 mg, Oral, Daily With Dinner, Eb Avery MD, 20 mg at 01/31/21 1700  •  [COMPLETED] Insert peripheral IV, , , Once **AND** sodium chloride 0.9 % flush 10 mL, 10 mL, Intravenous, PRN, Zion Capps MD     ASSESSMENT  Severe bradycardia   Syncopal episode secondary to bradycardia  Severe dementia  Depression  History of DVT on Xarelto  Vitamin B12 deficiency  Osteoarthritis  Gastroesophageal reflux disease    PLAN  Discharge back to assisted living on ZIO patch  Discharge summary dictated  EB AVERY MD

## 2021-02-01 NOTE — PROGRESS NOTES
[facility] Follow Up    Chief Complaint: Follow up syncope    Interval History: No complaints.  Appears comfortable.    Objective:     Objective:  Temp:  [97.7 °F (36.5 °C)-99.1 °F (37.3 °C)] 98.3 °F (36.8 °C)  Heart Rate:  [47-68] 54  Resp:  [16-18] 16  BP: (131-149)/(33-92) 137/80     Intake/Output Summary (Last 24 hours) at 2/1/2021 1129  Last data filed at 2/1/2021 0821  Gross per 24 hour   Intake 840 ml   Output --   Net 840 ml     Body mass index is 23.11 kg/m².      01/29/21  2204 01/30/21  0500 01/30/21  1421   Weight: 81.2 kg (179 lb 0.2 oz) 81.9 kg (180 lb 8.9 oz) 81.6 kg (180 lb)     Weight change:       Physical Exam:   General : Alert, cooperative, in no acute distress.  Neuro: Alert,cooperative and oriented.  Lungs: CTAB. Normal respiratory effort and rate.  CV: Regular rate and rhythm, normal S1 and S2, no murmurs, gallops or rubs.  ABD: Soft, nontender, nondistended. Positive bowel sounds.  Extr: No edema or cyanosis, moves all extremities.    Lab Review:   Results from last 7 days   Lab Units 02/01/21 0340 01/31/21 0349 01/29/21  1828   SODIUM mmol/L 137 136   < > 142   POTASSIUM mmol/L 3.9 4.0   < > 3.5   CHLORIDE mmol/L 102 101   < > 107   CO2 mmol/L 26.5 28.4   < > 28.0   BUN mg/dL 15 20   < > 18   CREATININE mg/dL 0.95 1.01   < > 0.99   GLUCOSE mg/dL 81 88   < > 109*   CALCIUM mg/dL 8.6 8.2*   < > 8.7   AST (SGOT) U/L  --  12  --  13   ALT (SGPT) U/L  --  9  --  10    < > = values in this interval not displayed.     Results from last 7 days   Lab Units 01/29/21 2021 01/29/21  1828   TROPONIN T ng/mL <0.010 <0.010     Results from last 7 days   Lab Units 02/01/21  0340 01/31/21  0349   WBC 10*3/mm3 7.70 9.97   HEMOGLOBIN g/dL 13.8 12.8*   HEMATOCRIT % 40.5 37.3*   PLATELETS 10*3/mm3 145 123*     Results from last 7 days   Lab Units 01/29/21  1828   INR  1.27*   APTT seconds 27.1         Results from last 7 days   Lab Units 01/31/21  0349   CHOLESTEROL mg/dL 112    TRIGLYCERIDES mg/dL 59   HDL CHOL mg/dL 55     Results from last 7 days   Lab Units 01/31/21  0349 01/29/21  1828   PROBNP pg/mL 612.8 554.8     Results from last 7 days   Lab Units 01/30/21  0422   TSH uIU/mL 1.900     I reviewed the patient's new clinical results.  I personally viewed and interpreted the patient's EKG  Current Medications:   Scheduled Meds:citalopram, 10 mg, Oral, Nightly  Divalproex Sodium, 250 mg, Oral, Q8H  donepezil, 5 mg, Oral, Nightly  memantine, 10 mg, Oral, Q12H  pantoprazole, 40 mg, Oral, Q AM  rivaroxaban, 20 mg, Oral, Daily With Dinner      Continuous Infusions:     Allergies:  No Known Allergies    Assessment/Plan:     1. Syncope. Not orthostatic.  Echo unremarkable except for possible small PFO.    2. Bradycardia.  Sinus bradycardia with no evidence of significant pauses or AV vashti disease.  3. History of vasovagal syncope  4. DVT. On anticoagulation with rivaroxaban.   5. Fevers.  Resolved. Blood cultures normal.   6. Dementia    -Okay to discharge from a cardiac standpoint.  Will need a ZIO monitor placed at discharge.  Follow-up in the office in 1 month.    Teresita Metz MD  02/01/21  11:29 EST

## 2021-02-02 NOTE — PROGRESS NOTES
Case Management Discharge Note      Final Note: Pt d/c home 2/1/21, to assisted living facility at [facility] via [employer] wheelchair van.  [employer] transport private paid by sonAbdias.............SRS    Provided Post Acute Provider List?: N/A  N/A Provider List Comment: Son declined need for list    Selected Continued Care - Discharged on 2/1/2021 Admission date: 1/29/2021 - Discharge disposition: Skilled Nursing Facility (DC - External)    Destination Coordination complete    Service Provider Selected Services Address Phone Fax Patient Preferred    [facility]  Assisted [place] [address] [phone] --          Durable Medical Equipment    No services have been selected for the patient.              Dialysis/Infusion    No services have been selected for the patient.              Home Medical Care    No services have been selected for the patient.              Therapy    No services have been selected for the patient.              Community Resources    No services have been selected for the patient.                       Final Discharge Disposition Code: 01 - home or self-care

## 2021-02-04 LAB
BACTERIA SPEC AEROBE CULT: NORMAL
BACTERIA SPEC AEROBE CULT: NORMAL

## 2021-10-06 NOTE — ANESTHESIA PROCEDURE NOTES
Airway  Urgency: elective    Airway not difficult    General Information and Staff    Patient location during procedure: OR  Anesthesiologist: ANUP VALLE    Indications and Patient Condition  Indications for airway management: airway protection    Preoxygenated: yes  Mask difficulty assessment: 1 - vent by mask    Final Airway Details  Final airway type: supraglottic airway      Successful airway: LMA  Size 5    Number of attempts at approach: 1               Stage 1 Add-On Histology Text: no tumor at margins

## 2022-01-01 ENCOUNTER — APPOINTMENT (OUTPATIENT)
Dept: GENERAL RADIOLOGY | Facility: HOSPITAL | Age: 77
End: 2022-01-01

## 2022-01-01 ENCOUNTER — APPOINTMENT (OUTPATIENT)
Dept: CT IMAGING | Facility: HOSPITAL | Age: 77
End: 2022-01-01

## 2022-01-01 ENCOUNTER — APPOINTMENT (OUTPATIENT)
Dept: CARDIOLOGY | Facility: HOSPITAL | Age: 77
End: 2022-01-01

## 2022-01-01 ENCOUNTER — HOSPITAL ENCOUNTER (INPATIENT)
Facility: HOSPITAL | Age: 77
LOS: 15 days | End: 2022-12-05
Attending: EMERGENCY MEDICINE | Admitting: INTERNAL MEDICINE

## 2022-01-01 ENCOUNTER — HOSPITAL ENCOUNTER (INPATIENT)
Facility: HOSPITAL | Age: 77
LOS: 3 days | End: 2022-12-08
Attending: HOSPITALIST | Admitting: HOSPITALIST

## 2022-01-01 ENCOUNTER — HOSPITAL ENCOUNTER (EMERGENCY)
Facility: HOSPITAL | Age: 77
Discharge: NURSING, REHAB OR LONG-TERM CARE | End: 2022-05-25
Attending: EMERGENCY MEDICINE | Admitting: EMERGENCY MEDICINE

## 2022-01-01 VITALS
TEMPERATURE: 100.3 F | SYSTOLIC BLOOD PRESSURE: 98 MMHG | BODY MASS INDEX: 24.67 KG/M2 | HEART RATE: 101 BPM | WEIGHT: 192.24 LBS | RESPIRATION RATE: 16 BRPM | HEIGHT: 74 IN | OXYGEN SATURATION: 70 % | DIASTOLIC BLOOD PRESSURE: 53 MMHG

## 2022-01-01 VITALS
SYSTOLIC BLOOD PRESSURE: 141 MMHG | DIASTOLIC BLOOD PRESSURE: 80 MMHG | TEMPERATURE: 96.9 F | OXYGEN SATURATION: 96 % | RESPIRATION RATE: 14 BRPM | HEART RATE: 54 BPM

## 2022-01-01 VITALS — TEMPERATURE: 98.5 F | DIASTOLIC BLOOD PRESSURE: 70 MMHG | SYSTOLIC BLOOD PRESSURE: 95 MMHG | OXYGEN SATURATION: 74 %

## 2022-01-01 DIAGNOSIS — J18.9 ATYPICAL PNEUMONIA: ICD-10-CM

## 2022-01-01 DIAGNOSIS — J96.01 ACUTE HYPOXEMIC RESPIRATORY FAILURE: Primary | ICD-10-CM

## 2022-01-01 DIAGNOSIS — F03.90 DEMENTIA WITHOUT BEHAVIORAL DISTURBANCE, UNSPECIFIED DEMENTIA TYPE: ICD-10-CM

## 2022-01-01 DIAGNOSIS — S32.89XA CLOSED FRACTURE OF OTHER PARTS OF PELVIS, INITIAL ENCOUNTER: ICD-10-CM

## 2022-01-01 DIAGNOSIS — S32.512A CLOSED FRACTURE OF SUPERIOR RAMUS OF LEFT PUBIS, INITIAL ENCOUNTER: ICD-10-CM

## 2022-01-01 DIAGNOSIS — R29.6 UNWITNESSED FALL: ICD-10-CM

## 2022-01-01 DIAGNOSIS — I26.99 OTHER ACUTE PULMONARY EMBOLISM WITHOUT ACUTE COR PULMONALE: ICD-10-CM

## 2022-01-01 DIAGNOSIS — R10.2 PELVIC PAIN: Primary | ICD-10-CM

## 2022-01-01 LAB
ABO GROUP BLD: NORMAL
ALBUMIN SERPL-MCNC: 1.8 G/DL (ref 3.5–5.2)
ALBUMIN SERPL-MCNC: 1.9 G/DL (ref 3.5–5.2)
ALBUMIN SERPL-MCNC: 2.1 G/DL (ref 3.5–5.2)
ALBUMIN SERPL-MCNC: 2.3 G/DL (ref 3.5–5.2)
ALBUMIN SERPL-MCNC: 2.3 G/DL (ref 3.5–5.2)
ALBUMIN SERPL-MCNC: 2.4 G/DL (ref 3.5–5.2)
ALBUMIN SERPL-MCNC: 2.6 G/DL (ref 3.5–5.2)
ALBUMIN SERPL-MCNC: 2.6 G/DL (ref 3.5–5.2)
ALBUMIN SERPL-MCNC: 3 G/DL (ref 3.5–5.2)
ALBUMIN/GLOB SERPL: 1.2 G/DL
ALP SERPL-CCNC: 77 U/L (ref 39–117)
ALT SERPL W P-5'-P-CCNC: 16 U/L (ref 1–41)
ANION GAP SERPL CALCULATED.3IONS-SCNC: 12 MMOL/L (ref 5–15)
ANION GAP SERPL CALCULATED.3IONS-SCNC: 15 MMOL/L (ref 5–15)
ANION GAP SERPL CALCULATED.3IONS-SCNC: 4 MMOL/L (ref 5–15)
ANION GAP SERPL CALCULATED.3IONS-SCNC: 4 MMOL/L (ref 5–15)
ANION GAP SERPL CALCULATED.3IONS-SCNC: 5 MMOL/L (ref 5–15)
ANION GAP SERPL CALCULATED.3IONS-SCNC: 5 MMOL/L (ref 5–15)
ANION GAP SERPL CALCULATED.3IONS-SCNC: 5.7 MMOL/L (ref 5–15)
ANION GAP SERPL CALCULATED.3IONS-SCNC: 6 MMOL/L (ref 5–15)
ANION GAP SERPL CALCULATED.3IONS-SCNC: 6.2 MMOL/L (ref 5–15)
ANION GAP SERPL CALCULATED.3IONS-SCNC: 6.7 MMOL/L (ref 5–15)
ANION GAP SERPL CALCULATED.3IONS-SCNC: 7.1 MMOL/L (ref 5–15)
ANION GAP SERPL CALCULATED.3IONS-SCNC: 7.9 MMOL/L (ref 5–15)
AORTIC DIMENSIONLESS INDEX: 0.7 (DI)
APTT PPP: 116.5 SECONDS (ref 22.7–35.4)
APTT PPP: 139.9 SECONDS (ref 22.7–35.4)
APTT PPP: 165.2 SECONDS (ref 22.7–35.4)
APTT PPP: 42.4 SECONDS (ref 22.7–35.4)
APTT PPP: 44.7 SECONDS (ref 22.7–35.4)
APTT PPP: 56.6 SECONDS (ref 22.7–35.4)
APTT PPP: 78.9 SECONDS (ref 22.7–35.4)
APTT PPP: 78.9 SECONDS (ref 22.7–35.4)
APTT PPP: 80.2 SECONDS (ref 22.7–35.4)
APTT PPP: 98.3 SECONDS (ref 22.7–35.4)
ARTERIAL PATENCY WRIST A: ABNORMAL
ARTERIAL PATENCY WRIST A: POSITIVE
AST SERPL-CCNC: 27 U/L (ref 1–40)
ATMOSPHERIC PRESS: 757.1 MMHG
ATMOSPHERIC PRESS: 765.2 MMHG
B PARAPERT DNA SPEC QL NAA+PROBE: NOT DETECTED
B PERT DNA SPEC QL NAA+PROBE: NOT DETECTED
BACTERIA BLD CULT: ABNORMAL
BACTERIA ID TEST ISLT QL CULT: ABNORMAL
BACTERIA SPEC AEROBE CULT: ABNORMAL
BACTERIA SPEC AEROBE CULT: NO GROWTH
BACTERIA SPEC AEROBE CULT: NORMAL
BACTERIA SPEC RESP CULT: NORMAL
BACTERIA UR QL AUTO: ABNORMAL /HPF
BASE EXCESS BLDA CALC-SCNC: -2.4 MMOL/L (ref 0–2)
BASE EXCESS BLDA CALC-SCNC: -3.8 MMOL/L (ref 0–2)
BASOPHILS # BLD AUTO: 0.04 10*3/MM3 (ref 0–0.2)
BASOPHILS # BLD AUTO: 0.05 10*3/MM3 (ref 0–0.2)
BASOPHILS NFR BLD AUTO: 0.3 % (ref 0–1.5)
BASOPHILS NFR BLD AUTO: 0.5 % (ref 0–1.5)
BDY SITE: ABNORMAL
BDY SITE: ABNORMAL
BH CV ECHO MEAS - ACS: 2.04 CM
BH CV ECHO MEAS - AO MAX PG: 4.2 MMHG
BH CV ECHO MEAS - AO MEAN PG: 2.06 MMHG
BH CV ECHO MEAS - AO V2 MAX: 102.4 CM/SEC
BH CV ECHO MEAS - AO V2 VTI: 15.8 CM
BH CV ECHO MEAS - AVA(I,D): 1.93 CM2
BH CV ECHO MEAS - EDV(CUBED): 54.6 ML
BH CV ECHO MEAS - EDV(MOD-SP4): 56 ML
BH CV ECHO MEAS - EF(MOD-BP): 68 %
BH CV ECHO MEAS - EF(MOD-SP4): 67.9 %
BH CV ECHO MEAS - ESV(CUBED): 15.9 ML
BH CV ECHO MEAS - ESV(MOD-SP4): 18 ML
BH CV ECHO MEAS - FS: 33.7 %
BH CV ECHO MEAS - IVS/LVPW: 1 CM
BH CV ECHO MEAS - IVSD: 1.04 CM
BH CV ECHO MEAS - LAT PEAK E' VEL: 8.5 CM/SEC
BH CV ECHO MEAS - LV DIASTOLIC VOL/BSA (35-75): 27.5 CM2
BH CV ECHO MEAS - LV MASS(C)D: 124.5 GRAMS
BH CV ECHO MEAS - LV MAX PG: 2.12 MMHG
BH CV ECHO MEAS - LV MEAN PG: 0.9 MMHG
BH CV ECHO MEAS - LV SYSTOLIC VOL/BSA (12-30): 8.9 CM2
BH CV ECHO MEAS - LV V1 MAX: 72.8 CM/SEC
BH CV ECHO MEAS - LV V1 VTI: 11.2 CM
BH CV ECHO MEAS - LVIDD: 3.8 CM
BH CV ECHO MEAS - LVIDS: 2.5 CM
BH CV ECHO MEAS - LVOT AREA: 2.7 CM2
BH CV ECHO MEAS - LVOT DIAM: 1.86 CM
BH CV ECHO MEAS - LVPWD: 1.04 CM
BH CV ECHO MEAS - MED PEAK E' VEL: 6.9 CM/SEC
BH CV ECHO MEAS - MV A DUR: 0.13 SEC
BH CV ECHO MEAS - MV A MAX VEL: 60.4 CM/SEC
BH CV ECHO MEAS - MV DEC SLOPE: 158.3 CM/SEC2
BH CV ECHO MEAS - MV DEC TIME: 310 MSEC
BH CV ECHO MEAS - MV E MAX VEL: 41.1 CM/SEC
BH CV ECHO MEAS - MV E/A: 0.68
BH CV ECHO MEAS - MV MAX PG: 2.8 MMHG
BH CV ECHO MEAS - MV MEAN PG: 0.84 MMHG
BH CV ECHO MEAS - MV P1/2T: 99.5 MSEC
BH CV ECHO MEAS - MV V2 VTI: 18.9 CM
BH CV ECHO MEAS - MVA(P1/2T): 2.21 CM2
BH CV ECHO MEAS - MVA(VTI): 1.62 CM2
BH CV ECHO MEAS - PA ACC TIME: 0.11 SEC
BH CV ECHO MEAS - PA PR(ACCEL): 31.5 MMHG
BH CV ECHO MEAS - PA V2 MAX: 100.4 CM/SEC
BH CV ECHO MEAS - PULM A REVS DUR: 0.17 SEC
BH CV ECHO MEAS - PULM A REVS VEL: 34.3 CM/SEC
BH CV ECHO MEAS - PULM DIAS VEL: 38.1 CM/SEC
BH CV ECHO MEAS - PULM S/D: 1.16
BH CV ECHO MEAS - PULM SYS VEL: 44.3 CM/SEC
BH CV ECHO MEAS - RV MAX PG: 1.13 MMHG
BH CV ECHO MEAS - RV V1 MAX: 53.1 CM/SEC
BH CV ECHO MEAS - RV V1 VTI: 11.6 CM
BH CV ECHO MEAS - SI(MOD-SP4): 18.7 ML/M2
BH CV ECHO MEAS - SV(LVOT): 30.5 ML
BH CV ECHO MEAS - SV(MOD-SP4): 38 ML
BH CV ECHO MEAS - TAPSE (>1.6): 2.6 CM
BH CV ECHO MEAS - TR MAX PG: 27.9 MMHG
BH CV ECHO MEAS - TR MAX VEL: 264.2 CM/SEC
BH CV ECHO MEASUREMENTS AVERAGE E/E' RATIO: 5.34
BH CV XLRA - RV BASE: 3.2 CM
BH CV XLRA - RV LENGTH: 6.4 CM
BH CV XLRA - RV MID: 2.8 CM
BH CV XLRA - TDI S': 13.3 CM/SEC
BILIRUB SERPL-MCNC: 0.7 MG/DL (ref 0–1.2)
BILIRUB UR QL STRIP: ABNORMAL
BLD GP AB SCN SERPL QL: NEGATIVE
BOTTLE TYPE: ABNORMAL
BUN SERPL-MCNC: 15 MG/DL (ref 8–23)
BUN SERPL-MCNC: 16 MG/DL (ref 8–23)
BUN SERPL-MCNC: 17 MG/DL (ref 8–23)
BUN SERPL-MCNC: 17 MG/DL (ref 8–23)
BUN SERPL-MCNC: 18 MG/DL (ref 8–23)
BUN SERPL-MCNC: 20 MG/DL (ref 8–23)
BUN SERPL-MCNC: 20 MG/DL (ref 8–23)
BUN SERPL-MCNC: 21 MG/DL (ref 8–23)
BUN SERPL-MCNC: 24 MG/DL (ref 8–23)
BUN SERPL-MCNC: 24 MG/DL (ref 8–23)
BUN SERPL-MCNC: 29 MG/DL (ref 8–23)
BUN SERPL-MCNC: 30 MG/DL (ref 8–23)
BUN/CREAT SERPL: 25.9 (ref 7–25)
BUN/CREAT SERPL: 26.6 (ref 7–25)
BUN/CREAT SERPL: 26.7 (ref 7–25)
BUN/CREAT SERPL: 28.3 (ref 7–25)
BUN/CREAT SERPL: 28.6 (ref 7–25)
BUN/CREAT SERPL: 30.2 (ref 7–25)
BUN/CREAT SERPL: 30.8 (ref 7–25)
BUN/CREAT SERPL: 30.9 (ref 7–25)
BUN/CREAT SERPL: 31.3 (ref 7–25)
BUN/CREAT SERPL: 32.9 (ref 7–25)
BUN/CREAT SERPL: 35.6 (ref 7–25)
BUN/CREAT SERPL: 47.1 (ref 7–25)
C PNEUM DNA NPH QL NAA+NON-PROBE: NOT DETECTED
CALCIUM SPEC-SCNC: 6.4 MG/DL (ref 8.6–10.5)
CALCIUM SPEC-SCNC: 7.7 MG/DL (ref 8.6–10.5)
CALCIUM SPEC-SCNC: 7.8 MG/DL (ref 8.6–10.5)
CALCIUM SPEC-SCNC: 7.9 MG/DL (ref 8.6–10.5)
CALCIUM SPEC-SCNC: 8 MG/DL (ref 8.6–10.5)
CALCIUM SPEC-SCNC: 8.1 MG/DL (ref 8.6–10.5)
CALCIUM SPEC-SCNC: 8.2 MG/DL (ref 8.6–10.5)
CALCIUM SPEC-SCNC: 8.2 MG/DL (ref 8.6–10.5)
CALCIUM SPEC-SCNC: 8.3 MG/DL (ref 8.6–10.5)
CALCIUM SPEC-SCNC: 8.6 MG/DL (ref 8.6–10.5)
CALCIUM SPEC-SCNC: 8.7 MG/DL (ref 8.6–10.5)
CALCIUM SPEC-SCNC: 8.8 MG/DL (ref 8.6–10.5)
CHLORIDE SERPL-SCNC: 100 MMOL/L (ref 98–107)
CHLORIDE SERPL-SCNC: 102 MMOL/L (ref 98–107)
CHLORIDE SERPL-SCNC: 103 MMOL/L (ref 98–107)
CHLORIDE SERPL-SCNC: 104 MMOL/L (ref 98–107)
CHLORIDE SERPL-SCNC: 105 MMOL/L (ref 98–107)
CHLORIDE SERPL-SCNC: 106 MMOL/L (ref 98–107)
CHLORIDE SERPL-SCNC: 106 MMOL/L (ref 98–107)
CHLORIDE SERPL-SCNC: 107 MMOL/L (ref 98–107)
CHLORIDE SERPL-SCNC: 109 MMOL/L (ref 98–107)
CHLORIDE SERPL-SCNC: 114 MMOL/L (ref 98–107)
CHLORIDE SERPL-SCNC: 116 MMOL/L (ref 98–107)
CHLORIDE SERPL-SCNC: 120 MMOL/L (ref 98–107)
CLARITY UR: CLEAR
CO2 SERPL-SCNC: 18 MMOL/L (ref 22–29)
CO2 SERPL-SCNC: 20 MMOL/L (ref 22–29)
CO2 SERPL-SCNC: 21.1 MMOL/L (ref 22–29)
CO2 SERPL-SCNC: 22 MMOL/L (ref 22–29)
CO2 SERPL-SCNC: 23 MMOL/L (ref 22–29)
CO2 SERPL-SCNC: 25 MMOL/L (ref 22–29)
CO2 SERPL-SCNC: 26.3 MMOL/L (ref 22–29)
CO2 SERPL-SCNC: 26.8 MMOL/L (ref 22–29)
CO2 SERPL-SCNC: 27 MMOL/L (ref 22–29)
CO2 SERPL-SCNC: 27 MMOL/L (ref 22–29)
CO2 SERPL-SCNC: 28.3 MMOL/L (ref 22–29)
CO2 SERPL-SCNC: 28.9 MMOL/L (ref 22–29)
COLOR UR: ABNORMAL
CREAT SERPL-MCNC: 0.51 MG/DL (ref 0.76–1.27)
CREAT SERPL-MCNC: 0.58 MG/DL (ref 0.76–1.27)
CREAT SERPL-MCNC: 0.59 MG/DL (ref 0.76–1.27)
CREAT SERPL-MCNC: 0.6 MG/DL (ref 0.76–1.27)
CREAT SERPL-MCNC: 0.6 MG/DL (ref 0.76–1.27)
CREAT SERPL-MCNC: 0.63 MG/DL (ref 0.76–1.27)
CREAT SERPL-MCNC: 0.64 MG/DL (ref 0.76–1.27)
CREAT SERPL-MCNC: 0.64 MG/DL (ref 0.76–1.27)
CREAT SERPL-MCNC: 0.65 MG/DL (ref 0.76–1.27)
CREAT SERPL-MCNC: 0.73 MG/DL (ref 0.76–1.27)
CREAT SERPL-MCNC: 0.96 MG/DL (ref 0.76–1.27)
CREAT SERPL-MCNC: 0.97 MG/DL (ref 0.76–1.27)
D-LACTATE SERPL-SCNC: 0.7 MMOL/L (ref 0.5–2)
D-LACTATE SERPL-SCNC: 1.9 MMOL/L (ref 0.5–2)
D-LACTATE SERPL-SCNC: 2.3 MMOL/L (ref 0.5–2)
D-LACTATE SERPL-SCNC: 2.8 MMOL/L (ref 0.5–2)
D-LACTATE SERPL-SCNC: 2.9 MMOL/L (ref 0.5–2)
D-LACTATE SERPL-SCNC: 3.2 MMOL/L (ref 0.5–2)
D-LACTATE SERPL-SCNC: 4.5 MMOL/L (ref 0.5–2)
DEPRECATED RDW RBC AUTO: 43.7 FL (ref 37–54)
DEPRECATED RDW RBC AUTO: 44.8 FL (ref 37–54)
DEPRECATED RDW RBC AUTO: 44.8 FL (ref 37–54)
DEPRECATED RDW RBC AUTO: 45.5 FL (ref 37–54)
DEPRECATED RDW RBC AUTO: 45.7 FL (ref 37–54)
DEPRECATED RDW RBC AUTO: 45.7 FL (ref 37–54)
DEPRECATED RDW RBC AUTO: 46.5 FL (ref 37–54)
DEPRECATED RDW RBC AUTO: 47 FL (ref 37–54)
DEPRECATED RDW RBC AUTO: 47.1 FL (ref 37–54)
DEPRECATED RDW RBC AUTO: 47.7 FL (ref 37–54)
DEPRECATED RDW RBC AUTO: 47.9 FL (ref 37–54)
DEPRECATED RDW RBC AUTO: 48.7 FL (ref 37–54)
DEPRECATED RDW RBC AUTO: 49.1 FL (ref 37–54)
DEPRECATED RDW RBC AUTO: 50.2 FL (ref 37–54)
EGFRCR SERPLBLD CKD-EPI 2021: 103.1 ML/MIN/1.73
EGFRCR SERPLBLD CKD-EPI 2021: 79.4 ML/MIN/1.73
EGFRCR SERPLBLD CKD-EPI 2021: 80.4 ML/MIN/1.73
EGFRCR SERPLBLD CKD-EPI 2021: 92.5 ML/MIN/1.73
EGFRCR SERPLBLD CKD-EPI 2021: 95.8 ML/MIN/1.73
EGFRCR SERPLBLD CKD-EPI 2021: 96.3 ML/MIN/1.73
EGFRCR SERPLBLD CKD-EPI 2021: 96.3 ML/MIN/1.73
EGFRCR SERPLBLD CKD-EPI 2021: 96.8 ML/MIN/1.73
EGFRCR SERPLBLD CKD-EPI 2021: 98.2 ML/MIN/1.73
EGFRCR SERPLBLD CKD-EPI 2021: 98.2 ML/MIN/1.73
EGFRCR SERPLBLD CKD-EPI 2021: 98.7 ML/MIN/1.73
EGFRCR SERPLBLD CKD-EPI 2021: 99.2 ML/MIN/1.73
EOSINOPHIL # BLD AUTO: 0 10*3/MM3 (ref 0–0.4)
EOSINOPHIL # BLD AUTO: 0.01 10*3/MM3 (ref 0–0.4)
EOSINOPHIL NFR BLD AUTO: 0 % (ref 0.3–6.2)
EOSINOPHIL NFR BLD AUTO: 0.1 % (ref 0.3–6.2)
ERYTHROCYTE [DISTWIDTH] IN BLOOD BY AUTOMATED COUNT: 13.3 % (ref 12.3–15.4)
ERYTHROCYTE [DISTWIDTH] IN BLOOD BY AUTOMATED COUNT: 13.3 % (ref 12.3–15.4)
ERYTHROCYTE [DISTWIDTH] IN BLOOD BY AUTOMATED COUNT: 13.5 % (ref 12.3–15.4)
ERYTHROCYTE [DISTWIDTH] IN BLOOD BY AUTOMATED COUNT: 13.5 % (ref 12.3–15.4)
ERYTHROCYTE [DISTWIDTH] IN BLOOD BY AUTOMATED COUNT: 13.6 % (ref 12.3–15.4)
ERYTHROCYTE [DISTWIDTH] IN BLOOD BY AUTOMATED COUNT: 13.7 % (ref 12.3–15.4)
ERYTHROCYTE [DISTWIDTH] IN BLOOD BY AUTOMATED COUNT: 13.7 % (ref 12.3–15.4)
ERYTHROCYTE [DISTWIDTH] IN BLOOD BY AUTOMATED COUNT: 13.8 % (ref 12.3–15.4)
ERYTHROCYTE [DISTWIDTH] IN BLOOD BY AUTOMATED COUNT: 13.8 % (ref 12.3–15.4)
ERYTHROCYTE [DISTWIDTH] IN BLOOD BY AUTOMATED COUNT: 13.9 % (ref 12.3–15.4)
ERYTHROCYTE [DISTWIDTH] IN BLOOD BY AUTOMATED COUNT: 13.9 % (ref 12.3–15.4)
ERYTHROCYTE [DISTWIDTH] IN BLOOD BY AUTOMATED COUNT: 14 % (ref 12.3–15.4)
ERYTHROCYTE [DISTWIDTH] IN BLOOD BY AUTOMATED COUNT: 14.1 % (ref 12.3–15.4)
ERYTHROCYTE [DISTWIDTH] IN BLOOD BY AUTOMATED COUNT: 14.2 % (ref 12.3–15.4)
FLUAV SUBTYP SPEC NAA+PROBE: NOT DETECTED
FLUBV RNA ISLT QL NAA+PROBE: NOT DETECTED
GLOBULIN UR ELPH-MCNC: 2.5 GM/DL
GLUCOSE BLDC GLUCOMTR-MCNC: 104 MG/DL (ref 70–130)
GLUCOSE BLDC GLUCOMTR-MCNC: 112 MG/DL (ref 70–130)
GLUCOSE BLDC GLUCOMTR-MCNC: 119 MG/DL (ref 70–130)
GLUCOSE BLDC GLUCOMTR-MCNC: 120 MG/DL (ref 70–130)
GLUCOSE BLDC GLUCOMTR-MCNC: 125 MG/DL (ref 70–130)
GLUCOSE BLDC GLUCOMTR-MCNC: 55 MG/DL (ref 70–130)
GLUCOSE BLDC GLUCOMTR-MCNC: 60 MG/DL (ref 70–130)
GLUCOSE BLDC GLUCOMTR-MCNC: 76 MG/DL (ref 70–130)
GLUCOSE BLDC GLUCOMTR-MCNC: 79 MG/DL (ref 70–130)
GLUCOSE BLDC GLUCOMTR-MCNC: 80 MG/DL (ref 70–130)
GLUCOSE BLDC GLUCOMTR-MCNC: 82 MG/DL (ref 70–130)
GLUCOSE BLDC GLUCOMTR-MCNC: 89 MG/DL (ref 70–130)
GLUCOSE BLDC GLUCOMTR-MCNC: 91 MG/DL (ref 70–130)
GLUCOSE BLDC GLUCOMTR-MCNC: 91 MG/DL (ref 70–130)
GLUCOSE BLDC GLUCOMTR-MCNC: 97 MG/DL (ref 70–130)
GLUCOSE BLDC GLUCOMTR-MCNC: 99 MG/DL (ref 70–130)
GLUCOSE SERPL-MCNC: 101 MG/DL (ref 65–99)
GLUCOSE SERPL-MCNC: 104 MG/DL (ref 65–99)
GLUCOSE SERPL-MCNC: 107 MG/DL (ref 65–99)
GLUCOSE SERPL-MCNC: 108 MG/DL (ref 65–99)
GLUCOSE SERPL-MCNC: 117 MG/DL (ref 65–99)
GLUCOSE SERPL-MCNC: 120 MG/DL (ref 65–99)
GLUCOSE SERPL-MCNC: 121 MG/DL (ref 65–99)
GLUCOSE SERPL-MCNC: 122 MG/DL (ref 65–99)
GLUCOSE SERPL-MCNC: 136 MG/DL (ref 65–99)
GLUCOSE SERPL-MCNC: 75 MG/DL (ref 65–99)
GLUCOSE SERPL-MCNC: 87 MG/DL (ref 65–99)
GLUCOSE SERPL-MCNC: 91 MG/DL (ref 65–99)
GLUCOSE UR STRIP-MCNC: NEGATIVE MG/DL
GRAM STN SPEC: ABNORMAL
GRAM STN SPEC: ABNORMAL
GRAM STN SPEC: NORMAL
HADV DNA SPEC NAA+PROBE: NOT DETECTED
HCO3 BLDA-SCNC: 19.4 MMOL/L (ref 22–28)
HCO3 BLDA-SCNC: 24.7 MMOL/L (ref 22–28)
HCOV 229E RNA SPEC QL NAA+PROBE: NOT DETECTED
HCOV HKU1 RNA SPEC QL NAA+PROBE: NOT DETECTED
HCOV NL63 RNA SPEC QL NAA+PROBE: NOT DETECTED
HCOV OC43 RNA SPEC QL NAA+PROBE: NOT DETECTED
HCT VFR BLD AUTO: 21.8 % (ref 37.5–51)
HCT VFR BLD AUTO: 24.5 % (ref 37.5–51)
HCT VFR BLD AUTO: 28.5 % (ref 37.5–51)
HCT VFR BLD AUTO: 29.3 % (ref 37.5–51)
HCT VFR BLD AUTO: 29.3 % (ref 37.5–51)
HCT VFR BLD AUTO: 29.4 % (ref 37.5–51)
HCT VFR BLD AUTO: 29.8 % (ref 37.5–51)
HCT VFR BLD AUTO: 30.6 % (ref 37.5–51)
HCT VFR BLD AUTO: 30.9 % (ref 37.5–51)
HCT VFR BLD AUTO: 31.1 % (ref 37.5–51)
HCT VFR BLD AUTO: 31.8 % (ref 37.5–51)
HCT VFR BLD AUTO: 34.3 % (ref 37.5–51)
HCT VFR BLD AUTO: 35.5 % (ref 37.5–51)
HCT VFR BLD AUTO: 44.7 % (ref 37.5–51)
HGB BLD-MCNC: 10.3 G/DL (ref 13–17.7)
HGB BLD-MCNC: 10.4 G/DL (ref 13–17.7)
HGB BLD-MCNC: 10.5 G/DL (ref 13–17.7)
HGB BLD-MCNC: 10.6 G/DL (ref 13–17.7)
HGB BLD-MCNC: 11.7 G/DL (ref 13–17.7)
HGB BLD-MCNC: 12.1 G/DL (ref 13–17.7)
HGB BLD-MCNC: 15.2 G/DL (ref 13–17.7)
HGB BLD-MCNC: 7.1 G/DL (ref 13–17.7)
HGB BLD-MCNC: 8.3 G/DL (ref 13–17.7)
HGB BLD-MCNC: 9.5 G/DL (ref 13–17.7)
HGB BLD-MCNC: 9.6 G/DL (ref 13–17.7)
HGB BLD-MCNC: 9.8 G/DL (ref 13–17.7)
HGB BLD-MCNC: 9.8 G/DL (ref 13–17.7)
HGB BLD-MCNC: 9.9 G/DL (ref 13–17.7)
HGB UR QL STRIP.AUTO: ABNORMAL
HMPV RNA NPH QL NAA+NON-PROBE: NOT DETECTED
HOLD SPECIMEN: NORMAL
HOLD SPECIMEN: NORMAL
HPIV1 RNA ISLT QL NAA+PROBE: NOT DETECTED
HPIV2 RNA SPEC QL NAA+PROBE: NOT DETECTED
HPIV3 RNA NPH QL NAA+PROBE: NOT DETECTED
HPIV4 P GENE NPH QL NAA+PROBE: NOT DETECTED
HYALINE CASTS UR QL AUTO: ABNORMAL /LPF
IMM GRANULOCYTES # BLD AUTO: 0.04 10*3/MM3 (ref 0–0.05)
IMM GRANULOCYTES NFR BLD AUTO: 0.4 % (ref 0–0.5)
INHALED O2 CONCENTRATION: 100 %
INHALED O2 CONCENTRATION: 50 %
INR PPP: 1.1 (ref 0.9–1.1)
ISOLATED FROM: ABNORMAL
KETONES UR QL STRIP: ABNORMAL
LEUKOCYTE ESTERASE UR QL STRIP.AUTO: ABNORMAL
LYMPHOCYTES # BLD AUTO: 1.19 10*3/MM3 (ref 0.7–3.1)
LYMPHOCYTES # BLD AUTO: 2.23 10*3/MM3 (ref 0.7–3.1)
LYMPHOCYTES NFR BLD AUTO: 22.4 % (ref 19.6–45.3)
LYMPHOCYTES NFR BLD AUTO: 8.6 % (ref 19.6–45.3)
M PNEUMO IGG SER IA-ACNC: NOT DETECTED
MAGNESIUM SERPL-MCNC: 1.6 MG/DL (ref 1.6–2.4)
MAGNESIUM SERPL-MCNC: 1.7 MG/DL (ref 1.6–2.4)
MAGNESIUM SERPL-MCNC: 1.8 MG/DL (ref 1.6–2.4)
MAGNESIUM SERPL-MCNC: 1.8 MG/DL (ref 1.6–2.4)
MAGNESIUM SERPL-MCNC: 2.2 MG/DL (ref 1.6–2.4)
MAXIMAL PREDICTED HEART RATE: 141 BPM
MCH RBC QN AUTO: 30.7 PG (ref 26.6–33)
MCH RBC QN AUTO: 30.8 PG (ref 26.6–33)
MCH RBC QN AUTO: 30.9 PG (ref 26.6–33)
MCH RBC QN AUTO: 30.9 PG (ref 26.6–33)
MCH RBC QN AUTO: 31 PG (ref 26.6–33)
MCH RBC QN AUTO: 31 PG (ref 26.6–33)
MCH RBC QN AUTO: 31.1 PG (ref 26.6–33)
MCH RBC QN AUTO: 31.1 PG (ref 26.6–33)
MCH RBC QN AUTO: 31.2 PG (ref 26.6–33)
MCH RBC QN AUTO: 31.7 PG (ref 26.6–33)
MCH RBC QN AUTO: 31.8 PG (ref 26.6–33)
MCH RBC QN AUTO: 31.8 PG (ref 26.6–33)
MCH RBC QN AUTO: 31.9 PG (ref 26.6–33)
MCH RBC QN AUTO: 31.9 PG (ref 26.6–33)
MCHC RBC AUTO-ENTMCNC: 31.7 G/DL (ref 31.5–35.7)
MCHC RBC AUTO-ENTMCNC: 32.4 G/DL (ref 31.5–35.7)
MCHC RBC AUTO-ENTMCNC: 32.6 G/DL (ref 31.5–35.7)
MCHC RBC AUTO-ENTMCNC: 32.7 G/DL (ref 31.5–35.7)
MCHC RBC AUTO-ENTMCNC: 33.3 G/DL (ref 31.5–35.7)
MCHC RBC AUTO-ENTMCNC: 33.8 G/DL (ref 31.5–35.7)
MCHC RBC AUTO-ENTMCNC: 33.8 G/DL (ref 31.5–35.7)
MCHC RBC AUTO-ENTMCNC: 33.9 G/DL (ref 31.5–35.7)
MCHC RBC AUTO-ENTMCNC: 34 G/DL (ref 31.5–35.7)
MCHC RBC AUTO-ENTMCNC: 34 G/DL (ref 31.5–35.7)
MCHC RBC AUTO-ENTMCNC: 34.1 G/DL (ref 31.5–35.7)
MCHC RBC AUTO-ENTMCNC: 34.1 G/DL (ref 31.5–35.7)
MCHC RBC AUTO-ENTMCNC: 34.4 G/DL (ref 31.5–35.7)
MCHC RBC AUTO-ENTMCNC: 34.6 G/DL (ref 31.5–35.7)
MCV RBC AUTO: 90.8 FL (ref 79–97)
MCV RBC AUTO: 91 FL (ref 79–97)
MCV RBC AUTO: 91.1 FL (ref 79–97)
MCV RBC AUTO: 91.4 FL (ref 79–97)
MCV RBC AUTO: 91.5 FL (ref 79–97)
MCV RBC AUTO: 91.6 FL (ref 79–97)
MCV RBC AUTO: 92 FL (ref 79–97)
MCV RBC AUTO: 93.5 FL (ref 79–97)
MCV RBC AUTO: 93.7 FL (ref 79–97)
MCV RBC AUTO: 93.9 FL (ref 79–97)
MCV RBC AUTO: 95.8 FL (ref 79–97)
MCV RBC AUTO: 96.1 FL (ref 79–97)
MCV RBC AUTO: 97.2 FL (ref 79–97)
MCV RBC AUTO: 97.8 FL (ref 79–97)
MODALITY: ABNORMAL
MODALITY: ABNORMAL
MONOCYTES # BLD AUTO: 1.33 10*3/MM3 (ref 0.1–0.9)
MONOCYTES # BLD AUTO: 1.97 10*3/MM3 (ref 0.1–0.9)
MONOCYTES NFR BLD AUTO: 13.4 % (ref 5–12)
MONOCYTES NFR BLD AUTO: 14.3 % (ref 5–12)
NEUTROPHILS NFR BLD AUTO: 10.4 10*3/MM3 (ref 1.7–7)
NEUTROPHILS NFR BLD AUTO: 6.3 10*3/MM3 (ref 1.7–7)
NEUTROPHILS NFR BLD AUTO: 63.2 % (ref 42.7–76)
NEUTROPHILS NFR BLD AUTO: 75.4 % (ref 42.7–76)
NITRITE UR QL STRIP: NEGATIVE
NRBC BLD AUTO-RTO: 0.1 /100 WBC (ref 0–0.2)
NT-PROBNP SERPL-MCNC: 1699 PG/ML (ref 0–1800)
O2 A-A PPRESDIFF RESPIRATORY: 0.2 MMHG
O2 A-A PPRESDIFF RESPIRATORY: 0.4 MMHG
PCO2 BLDA: 29.1 MM HG (ref 35–45)
PCO2 BLDA: 50.9 MM HG (ref 35–45)
PEEP RESPIRATORY: 5 CM[H2O]
PEEP RESPIRATORY: 5 CM[H2O]
PH BLDA: 7.29 PH UNITS (ref 7.35–7.45)
PH BLDA: 7.43 PH UNITS (ref 7.35–7.45)
PH UR STRIP.AUTO: 6 [PH] (ref 5–8)
PHOSPHATE SERPL-MCNC: 1.1 MG/DL (ref 2.5–4.5)
PHOSPHATE SERPL-MCNC: 1.5 MG/DL (ref 2.5–4.5)
PHOSPHATE SERPL-MCNC: 1.7 MG/DL (ref 2.5–4.5)
PHOSPHATE SERPL-MCNC: 2.5 MG/DL (ref 2.5–4.5)
PHOSPHATE SERPL-MCNC: 2.8 MG/DL (ref 2.5–4.5)
PHOSPHATE SERPL-MCNC: 2.9 MG/DL (ref 2.5–4.5)
PHOSPHATE SERPL-MCNC: 2.9 MG/DL (ref 2.5–4.5)
PHOSPHATE SERPL-MCNC: 3 MG/DL (ref 2.5–4.5)
PHOSPHATE SERPL-MCNC: 3.1 MG/DL (ref 2.5–4.5)
PHOSPHATE SERPL-MCNC: 3.2 MG/DL (ref 2.5–4.5)
PHOSPHATE SERPL-MCNC: 3.3 MG/DL (ref 2.5–4.5)
PHOSPHATE SERPL-MCNC: 3.5 MG/DL (ref 2.5–4.5)
PHOSPHATE SERPL-MCNC: 4.2 MG/DL (ref 2.5–4.5)
PLAT MORPH BLD: NORMAL
PLATELET # BLD AUTO: 130 10*3/MM3 (ref 140–450)
PLATELET # BLD AUTO: 146 10*3/MM3 (ref 140–450)
PLATELET # BLD AUTO: 155 10*3/MM3 (ref 140–450)
PLATELET # BLD AUTO: 166 10*3/MM3 (ref 140–450)
PLATELET # BLD AUTO: 182 10*3/MM3 (ref 140–450)
PLATELET # BLD AUTO: 187 10*3/MM3 (ref 140–450)
PLATELET # BLD AUTO: 214 10*3/MM3 (ref 140–450)
PLATELET # BLD AUTO: 304 10*3/MM3 (ref 140–450)
PLATELET # BLD AUTO: 328 10*3/MM3 (ref 140–450)
PLATELET # BLD AUTO: 332 10*3/MM3 (ref 140–450)
PLATELET # BLD AUTO: 358 10*3/MM3 (ref 140–450)
PLATELET # BLD AUTO: 391 10*3/MM3 (ref 140–450)
PLATELET # BLD AUTO: 408 10*3/MM3 (ref 140–450)
PLATELET # BLD AUTO: 460 10*3/MM3 (ref 140–450)
PMV BLD AUTO: 8.5 FL (ref 6–12)
PMV BLD AUTO: 8.5 FL (ref 6–12)
PMV BLD AUTO: 8.6 FL (ref 6–12)
PMV BLD AUTO: 9 FL (ref 6–12)
PMV BLD AUTO: 9.1 FL (ref 6–12)
PMV BLD AUTO: 9.2 FL (ref 6–12)
PMV BLD AUTO: 9.3 FL (ref 6–12)
PMV BLD AUTO: 9.5 FL (ref 6–12)
PMV BLD AUTO: 9.6 FL (ref 6–12)
PMV BLD AUTO: 9.7 FL (ref 6–12)
PO2 BLDA: 141.6 MM HG (ref 80–100)
PO2 BLDA: 149.4 MM HG (ref 80–100)
POTASSIUM SERPL-SCNC: 2.7 MMOL/L (ref 3.5–5.2)
POTASSIUM SERPL-SCNC: 3.2 MMOL/L (ref 3.5–5.2)
POTASSIUM SERPL-SCNC: 3.5 MMOL/L (ref 3.5–5.2)
POTASSIUM SERPL-SCNC: 3.8 MMOL/L (ref 3.5–5.2)
POTASSIUM SERPL-SCNC: 3.9 MMOL/L (ref 3.5–5.2)
POTASSIUM SERPL-SCNC: 4 MMOL/L (ref 3.5–5.2)
POTASSIUM SERPL-SCNC: 4.1 MMOL/L (ref 3.5–5.2)
POTASSIUM SERPL-SCNC: 4.3 MMOL/L (ref 3.5–5.2)
POTASSIUM SERPL-SCNC: 4.3 MMOL/L (ref 3.5–5.2)
POTASSIUM SERPL-SCNC: 4.5 MMOL/L (ref 3.5–5.2)
POTASSIUM SERPL-SCNC: 4.6 MMOL/L (ref 3.5–5.2)
POTASSIUM SERPL-SCNC: 4.7 MMOL/L (ref 3.5–5.2)
PROCALCITONIN SERPL-MCNC: 0.2 NG/ML (ref 0–0.25)
PROCALCITONIN SERPL-MCNC: 0.26 NG/ML (ref 0–0.25)
PROCALCITONIN SERPL-MCNC: 3.56 NG/ML (ref 0–0.25)
PROCALCITONIN SERPL-MCNC: 4.82 NG/ML (ref 0–0.25)
PROT SERPL-MCNC: 5.5 G/DL (ref 6–8.5)
PROT UR QL STRIP: ABNORMAL
PROTHROMBIN TIME: 14.3 SECONDS (ref 11.7–14.2)
QT INTERVAL: 307 MS
QT INTERVAL: 387 MS
RBC # BLD AUTO: 2.23 10*6/MM3 (ref 4.14–5.8)
RBC # BLD AUTO: 2.62 10*6/MM3 (ref 4.14–5.8)
RBC # BLD AUTO: 3.05 10*6/MM3 (ref 4.14–5.8)
RBC # BLD AUTO: 3.13 10*6/MM3 (ref 4.14–5.8)
RBC # BLD AUTO: 3.14 10*6/MM3 (ref 4.14–5.8)
RBC # BLD AUTO: 3.18 10*6/MM3 (ref 4.14–5.8)
RBC # BLD AUTO: 3.2 10*6/MM3 (ref 4.14–5.8)
RBC # BLD AUTO: 3.24 10*6/MM3 (ref 4.14–5.8)
RBC # BLD AUTO: 3.32 10*6/MM3 (ref 4.14–5.8)
RBC # BLD AUTO: 3.36 10*6/MM3 (ref 4.14–5.8)
RBC # BLD AUTO: 3.4 10*6/MM3 (ref 4.14–5.8)
RBC # BLD AUTO: 3.77 10*6/MM3 (ref 4.14–5.8)
RBC # BLD AUTO: 3.79 10*6/MM3 (ref 4.14–5.8)
RBC # BLD AUTO: 4.89 10*6/MM3 (ref 4.14–5.8)
RBC # UR STRIP: ABNORMAL /HPF
RBC MORPH BLD: NORMAL
REF LAB TEST METHOD: ABNORMAL
RH BLD: POSITIVE
RHINOVIRUS RNA SPEC NAA+PROBE: NOT DETECTED
RSV RNA NPH QL NAA+NON-PROBE: NOT DETECTED
SAO2 % BLDCOA: 99 % (ref 92–99)
SAO2 % BLDCOA: 99.3 % (ref 92–99)
SARS-COV-2 RNA NPH QL NAA+NON-PROBE: NOT DETECTED
SET MECH RESP RATE: 10
SET MECH RESP RATE: 18
SINUS: 3.3 CM
SODIUM SERPL-SCNC: 135 MMOL/L (ref 136–145)
SODIUM SERPL-SCNC: 136 MMOL/L (ref 136–145)
SODIUM SERPL-SCNC: 137 MMOL/L (ref 136–145)
SODIUM SERPL-SCNC: 137 MMOL/L (ref 136–145)
SODIUM SERPL-SCNC: 138 MMOL/L (ref 136–145)
SODIUM SERPL-SCNC: 139 MMOL/L (ref 136–145)
SODIUM SERPL-SCNC: 141 MMOL/L (ref 136–145)
SODIUM SERPL-SCNC: 141 MMOL/L (ref 136–145)
SODIUM SERPL-SCNC: 143 MMOL/L (ref 136–145)
SODIUM SERPL-SCNC: 145 MMOL/L (ref 136–145)
SP GR UR STRIP: >=1.03 (ref 1–1.03)
SQUAMOUS #/AREA URNS HPF: ABNORMAL /HPF
STRESS TARGET HR: 120 BPM
T&S EXPIRATION DATE: NORMAL
T4 FREE SERPL-MCNC: 1.2 NG/DL (ref 0.93–1.7)
TOTAL RATE: 18 BREATHS/MINUTE
TOTAL RATE: 18 BREATHS/MINUTE
TROPONIN T SERPL-MCNC: 0.03 NG/ML (ref 0–0.03)
TROPONIN T SERPL-MCNC: 0.04 NG/ML (ref 0–0.03)
TSH SERPL DL<=0.05 MIU/L-ACNC: 2.41 UIU/ML (ref 0.27–4.2)
UROBILINOGEN UR QL STRIP: ABNORMAL
VANCOMYCIN TROUGH SERPL-MCNC: 10.5 MCG/ML (ref 5–20)
VENTILATOR MODE: ABNORMAL
VENTILATOR MODE: AC
VIT B12 BLD-MCNC: 1366 PG/ML (ref 211–946)
VT ON VENT VENT: 550 ML
WBC # UR STRIP: ABNORMAL /HPF
WBC MORPH BLD: NORMAL
WBC NRBC COR # BLD: 10.46 10*3/MM3 (ref 3.4–10.8)
WBC NRBC COR # BLD: 10.7 10*3/MM3 (ref 3.4–10.8)
WBC NRBC COR # BLD: 11.23 10*3/MM3 (ref 3.4–10.8)
WBC NRBC COR # BLD: 12 10*3/MM3 (ref 3.4–10.8)
WBC NRBC COR # BLD: 12.29 10*3/MM3 (ref 3.4–10.8)
WBC NRBC COR # BLD: 13.29 10*3/MM3 (ref 3.4–10.8)
WBC NRBC COR # BLD: 13.61 10*3/MM3 (ref 3.4–10.8)
WBC NRBC COR # BLD: 13.8 10*3/MM3 (ref 3.4–10.8)
WBC NRBC COR # BLD: 18.5 10*3/MM3 (ref 3.4–10.8)
WBC NRBC COR # BLD: 21.66 10*3/MM3 (ref 3.4–10.8)
WBC NRBC COR # BLD: 8.24 10*3/MM3 (ref 3.4–10.8)
WBC NRBC COR # BLD: 8.37 10*3/MM3 (ref 3.4–10.8)
WBC NRBC COR # BLD: 8.69 10*3/MM3 (ref 3.4–10.8)
WBC NRBC COR # BLD: 9.96 10*3/MM3 (ref 3.4–10.8)
WHOLE BLOOD HOLD COAG: NORMAL
WHOLE BLOOD HOLD SPECIMEN: NORMAL

## 2022-01-01 PROCEDURE — 83735 ASSAY OF MAGNESIUM: CPT | Performed by: INTERNAL MEDICINE

## 2022-01-01 PROCEDURE — 25010000002 CEFEPIME PER 500 MG: Performed by: EMERGENCY MEDICINE

## 2022-01-01 PROCEDURE — 84132 ASSAY OF SERUM POTASSIUM: CPT | Performed by: INTERNAL MEDICINE

## 2022-01-01 PROCEDURE — 25010000002 CEFEPIME PER 500 MG: Performed by: INTERNAL MEDICINE

## 2022-01-01 PROCEDURE — 94799 UNLISTED PULMONARY SVC/PX: CPT

## 2022-01-01 PROCEDURE — 25010000002 HEPARIN (PORCINE) 25000-0.45 UT/250ML-% SOLUTION: Performed by: EMERGENCY MEDICINE

## 2022-01-01 PROCEDURE — 82962 GLUCOSE BLOOD TEST: CPT

## 2022-01-01 PROCEDURE — 25010000002 LORAZEPAM PER 2 MG: Performed by: HOSPITALIST

## 2022-01-01 PROCEDURE — 25010000002 MORPHINE PER 10 MG: Performed by: STUDENT IN AN ORGANIZED HEALTH CARE EDUCATION/TRAINING PROGRAM

## 2022-01-01 PROCEDURE — 87147 CULTURE TYPE IMMUNOLOGIC: CPT | Performed by: EMERGENCY MEDICINE

## 2022-01-01 PROCEDURE — 25010000002 HEPARIN (PORCINE) PER 1000 UNITS: Performed by: EMERGENCY MEDICINE

## 2022-01-01 PROCEDURE — 85730 THROMBOPLASTIN TIME PARTIAL: CPT | Performed by: INTERNAL MEDICINE

## 2022-01-01 PROCEDURE — 94760 N-INVAS EAR/PLS OXIMETRY 1: CPT

## 2022-01-01 PROCEDURE — 85027 COMPLETE CBC AUTOMATED: CPT | Performed by: INTERNAL MEDICINE

## 2022-01-01 PROCEDURE — 99238 HOSP IP/OBS DSCHRG MGMT 30/<: CPT | Performed by: INTERNAL MEDICINE

## 2022-01-01 PROCEDURE — 99291 CRITICAL CARE FIRST HOUR: CPT

## 2022-01-01 PROCEDURE — 82803 BLOOD GASES ANY COMBINATION: CPT

## 2022-01-01 PROCEDURE — 25010000002 LORAZEPAM PER 2 MG: Performed by: STUDENT IN AN ORGANIZED HEALTH CARE EDUCATION/TRAINING PROGRAM

## 2022-01-01 PROCEDURE — 94664 DEMO&/EVAL PT USE INHALER: CPT

## 2022-01-01 PROCEDURE — 85610 PROTHROMBIN TIME: CPT | Performed by: EMERGENCY MEDICINE

## 2022-01-01 PROCEDURE — 25010000002 PERFLUTREN (DEFINITY) 8.476 MG IN SODIUM CHLORIDE (PF) 0.9 % 10 ML INJECTION: Performed by: INTERNAL MEDICINE

## 2022-01-01 PROCEDURE — 99223 1ST HOSP IP/OBS HIGH 75: CPT | Performed by: INTERNAL MEDICINE

## 2022-01-01 PROCEDURE — 25010000002 ENOXAPARIN PER 10 MG: Performed by: INTERNAL MEDICINE

## 2022-01-01 PROCEDURE — C1751 CATH, INF, PER/CENT/MIDLINE: HCPCS

## 2022-01-01 PROCEDURE — 25010000002 PHENYLEPHRINE 10 MG/ML SOLUTION: Performed by: INTERNAL MEDICINE

## 2022-01-01 PROCEDURE — 94761 N-INVAS EAR/PLS OXIMETRY MLT: CPT

## 2022-01-01 PROCEDURE — 25010000002 HYDROMORPHONE PER 4 MG: Performed by: INTERNAL MEDICINE

## 2022-01-01 PROCEDURE — 25010000002 FENTANYL CITRATE (PF) 50 MCG/ML SOLUTION

## 2022-01-01 PROCEDURE — 94003 VENT MGMT INPAT SUBQ DAY: CPT

## 2022-01-01 PROCEDURE — 80069 RENAL FUNCTION PANEL: CPT | Performed by: INTERNAL MEDICINE

## 2022-01-01 PROCEDURE — 94002 VENT MGMT INPAT INIT DAY: CPT

## 2022-01-01 PROCEDURE — 05H933Z INSERTION OF INFUSION DEVICE INTO RIGHT BRACHIAL VEIN, PERCUTANEOUS APPROACH: ICD-10-PCS | Performed by: INTERNAL MEDICINE

## 2022-01-01 PROCEDURE — P9612 CATHETERIZE FOR URINE SPEC: HCPCS

## 2022-01-01 PROCEDURE — 99283 EMERGENCY DEPT VISIT LOW MDM: CPT

## 2022-01-01 PROCEDURE — 84145 PROCALCITONIN (PCT): CPT | Performed by: EMERGENCY MEDICINE

## 2022-01-01 PROCEDURE — 84145 PROCALCITONIN (PCT): CPT | Performed by: INTERNAL MEDICINE

## 2022-01-01 PROCEDURE — 83605 ASSAY OF LACTIC ACID: CPT | Performed by: INTERNAL MEDICINE

## 2022-01-01 PROCEDURE — 83735 ASSAY OF MAGNESIUM: CPT | Performed by: EMERGENCY MEDICINE

## 2022-01-01 PROCEDURE — 80053 COMPREHEN METABOLIC PANEL: CPT | Performed by: EMERGENCY MEDICINE

## 2022-01-01 PROCEDURE — 25010000002 PROPOFOL 10 MG/ML EMULSION: Performed by: EMERGENCY MEDICINE

## 2022-01-01 PROCEDURE — 93306 TTE W/DOPPLER COMPLETE: CPT

## 2022-01-01 PROCEDURE — 99222 1ST HOSP IP/OBS MODERATE 55: CPT | Performed by: INTERNAL MEDICINE

## 2022-01-01 PROCEDURE — 71045 X-RAY EXAM CHEST 1 VIEW: CPT

## 2022-01-01 PROCEDURE — 83880 ASSAY OF NATRIURETIC PEPTIDE: CPT | Performed by: EMERGENCY MEDICINE

## 2022-01-01 PROCEDURE — 0202U NFCT DS 22 TRGT SARS-COV-2: CPT | Performed by: EMERGENCY MEDICINE

## 2022-01-01 PROCEDURE — 0 IOPAMIDOL PER 1 ML: Performed by: EMERGENCY MEDICINE

## 2022-01-01 PROCEDURE — 74177 CT ABD & PELVIS W/CONTRAST: CPT

## 2022-01-01 PROCEDURE — 85730 THROMBOPLASTIN TIME PARTIAL: CPT | Performed by: EMERGENCY MEDICINE

## 2022-01-01 PROCEDURE — 85025 COMPLETE CBC W/AUTO DIFF WBC: CPT | Performed by: EMERGENCY MEDICINE

## 2022-01-01 PROCEDURE — 84100 ASSAY OF PHOSPHORUS: CPT | Performed by: EMERGENCY MEDICINE

## 2022-01-01 PROCEDURE — 81001 URINALYSIS AUTO W/SCOPE: CPT | Performed by: EMERGENCY MEDICINE

## 2022-01-01 PROCEDURE — 84100 ASSAY OF PHOSPHORUS: CPT | Performed by: INTERNAL MEDICINE

## 2022-01-01 PROCEDURE — 25010000002 LORAZEPAM PER 2 MG: Performed by: NURSE PRACTITIONER

## 2022-01-01 PROCEDURE — 5A1945Z RESPIRATORY VENTILATION, 24-96 CONSECUTIVE HOURS: ICD-10-PCS | Performed by: EMERGENCY MEDICINE

## 2022-01-01 PROCEDURE — 0BH17EZ INSERTION OF ENDOTRACHEAL AIRWAY INTO TRACHEA, VIA NATURAL OR ARTIFICIAL OPENING: ICD-10-PCS | Performed by: EMERGENCY MEDICINE

## 2022-01-01 PROCEDURE — 25010000002 HALOPERIDOL LACTATE PER 5 MG

## 2022-01-01 PROCEDURE — 93010 ELECTROCARDIOGRAM REPORT: CPT | Performed by: INTERNAL MEDICINE

## 2022-01-01 PROCEDURE — 36600 WITHDRAWAL OF ARTERIAL BLOOD: CPT

## 2022-01-01 PROCEDURE — 80202 ASSAY OF VANCOMYCIN: CPT | Performed by: INTERNAL MEDICINE

## 2022-01-01 PROCEDURE — 72110 X-RAY EXAM L-2 SPINE 4/>VWS: CPT

## 2022-01-01 PROCEDURE — 25010000002 DIGOXIN PER 500 MCG: Performed by: INTERNAL MEDICINE

## 2022-01-01 PROCEDURE — 74018 RADEX ABDOMEN 1 VIEW: CPT

## 2022-01-01 PROCEDURE — 25010000002 VANCOMYCIN 10 G RECONSTITUTED SOLUTION: Performed by: EMERGENCY MEDICINE

## 2022-01-01 PROCEDURE — 87150 DNA/RNA AMPLIFIED PROBE: CPT | Performed by: EMERGENCY MEDICINE

## 2022-01-01 PROCEDURE — 36415 COLL VENOUS BLD VENIPUNCTURE: CPT

## 2022-01-01 PROCEDURE — 84484 ASSAY OF TROPONIN QUANT: CPT | Performed by: EMERGENCY MEDICINE

## 2022-01-01 PROCEDURE — 93005 ELECTROCARDIOGRAM TRACING: CPT | Performed by: INTERNAL MEDICINE

## 2022-01-01 PROCEDURE — 87070 CULTURE OTHR SPECIMN AEROBIC: CPT | Performed by: INTERNAL MEDICINE

## 2022-01-01 PROCEDURE — 71275 CT ANGIOGRAPHY CHEST: CPT

## 2022-01-01 PROCEDURE — 85007 BL SMEAR W/DIFF WBC COUNT: CPT | Performed by: EMERGENCY MEDICINE

## 2022-01-01 PROCEDURE — 84439 ASSAY OF FREE THYROXINE: CPT | Performed by: EMERGENCY MEDICINE

## 2022-01-01 PROCEDURE — 87086 URINE CULTURE/COLONY COUNT: CPT | Performed by: EMERGENCY MEDICINE

## 2022-01-01 PROCEDURE — 83605 ASSAY OF LACTIC ACID: CPT | Performed by: EMERGENCY MEDICINE

## 2022-01-01 PROCEDURE — 36410 VNPNXR 3YR/> PHY/QHP DX/THER: CPT

## 2022-01-01 PROCEDURE — 02HV33Z INSERTION OF INFUSION DEVICE INTO SUPERIOR VENA CAVA, PERCUTANEOUS APPROACH: ICD-10-PCS | Performed by: INTERNAL MEDICINE

## 2022-01-01 PROCEDURE — 87205 SMEAR GRAM STAIN: CPT | Performed by: INTERNAL MEDICINE

## 2022-01-01 PROCEDURE — 84443 ASSAY THYROID STIM HORMONE: CPT | Performed by: EMERGENCY MEDICINE

## 2022-01-01 PROCEDURE — 82607 VITAMIN B-12: CPT | Performed by: INTERNAL MEDICINE

## 2022-01-01 PROCEDURE — 70450 CT HEAD/BRAIN W/O DYE: CPT

## 2022-01-01 PROCEDURE — 86850 RBC ANTIBODY SCREEN: CPT | Performed by: EMERGENCY MEDICINE

## 2022-01-01 PROCEDURE — 80048 BASIC METABOLIC PNL TOTAL CA: CPT

## 2022-01-01 PROCEDURE — 93306 TTE W/DOPPLER COMPLETE: CPT | Performed by: INTERNAL MEDICINE

## 2022-01-01 PROCEDURE — 25010000002 AZITHROMYCIN PER 500 MG: Performed by: EMERGENCY MEDICINE

## 2022-01-01 PROCEDURE — 99233 SBSQ HOSP IP/OBS HIGH 50: CPT | Performed by: INTERNAL MEDICINE

## 2022-01-01 PROCEDURE — 99231 SBSQ HOSP IP/OBS SF/LOW 25: CPT | Performed by: INTERNAL MEDICINE

## 2022-01-01 PROCEDURE — 72192 CT PELVIS W/O DYE: CPT

## 2022-01-01 PROCEDURE — 93005 ELECTROCARDIOGRAM TRACING: CPT

## 2022-01-01 PROCEDURE — 25010000002 HYDROMORPHONE PER 4 MG: Performed by: HOSPITALIST

## 2022-01-01 PROCEDURE — 25010000002 VANCOMYCIN 750 MG RECONSTITUTED SOLUTION: Performed by: INTERNAL MEDICINE

## 2022-01-01 PROCEDURE — 73521 X-RAY EXAM HIPS BI 2 VIEWS: CPT

## 2022-01-01 PROCEDURE — 94640 AIRWAY INHALATION TREATMENT: CPT

## 2022-01-01 PROCEDURE — 25010000002 HYDROMORPHONE PER 4 MG: Performed by: STUDENT IN AN ORGANIZED HEALTH CARE EDUCATION/TRAINING PROGRAM

## 2022-01-01 PROCEDURE — 99221 1ST HOSP IP/OBS SF/LOW 40: CPT | Performed by: INTERNAL MEDICINE

## 2022-01-01 PROCEDURE — 87040 BLOOD CULTURE FOR BACTERIA: CPT | Performed by: EMERGENCY MEDICINE

## 2022-01-01 PROCEDURE — 86900 BLOOD TYPING SEROLOGIC ABO: CPT | Performed by: EMERGENCY MEDICINE

## 2022-01-01 PROCEDURE — 86901 BLOOD TYPING SEROLOGIC RH(D): CPT | Performed by: EMERGENCY MEDICINE

## 2022-01-01 PROCEDURE — 25010000002 HYDROMORPHONE 1 MG/ML SOLUTION: Performed by: STUDENT IN AN ORGANIZED HEALTH CARE EDUCATION/TRAINING PROGRAM

## 2022-01-01 RX ORDER — SODIUM CHLORIDE 0.9 % (FLUSH) 0.9 %
10 SYRINGE (ML) INJECTION AS NEEDED
Status: DISCONTINUED | OUTPATIENT
Start: 2022-01-01 | End: 2022-01-01 | Stop reason: HOSPADM

## 2022-01-01 RX ORDER — GLYCOPYRROLATE 0.2 MG/ML
0.4 INJECTION INTRAMUSCULAR; INTRAVENOUS
Status: CANCELLED | OUTPATIENT
Start: 2022-01-01

## 2022-01-01 RX ORDER — BISACODYL 10 MG
10 SUPPOSITORY, RECTAL RECTAL DAILY PRN
Status: DISCONTINUED | OUTPATIENT
Start: 2022-01-01 | End: 2022-01-01

## 2022-01-01 RX ORDER — ACETAMINOPHEN 160 MG/5ML
650 SOLUTION ORAL EVERY 4 HOURS PRN
Status: DISCONTINUED | OUTPATIENT
Start: 2022-01-01 | End: 2022-01-01 | Stop reason: SDUPTHER

## 2022-01-01 RX ORDER — MAGNESIUM SULFATE HEPTAHYDRATE 40 MG/ML
2 INJECTION, SOLUTION INTRAVENOUS AS NEEDED
Status: DISCONTINUED | OUTPATIENT
Start: 2022-01-01 | End: 2022-01-01

## 2022-01-01 RX ORDER — LORAZEPAM 2 MG/ML
1 CONCENTRATE ORAL
Status: DISCONTINUED | OUTPATIENT
Start: 2022-01-01 | End: 2022-01-01

## 2022-01-01 RX ORDER — SCOLOPAMINE TRANSDERMAL SYSTEM 1 MG/1
1 PATCH, EXTENDED RELEASE TRANSDERMAL
Status: CANCELLED | OUTPATIENT
Start: 2022-01-01

## 2022-01-01 RX ORDER — PROMETHAZINE HYDROCHLORIDE 6.25 MG/5ML
12.5 SYRUP ORAL EVERY 4 HOURS PRN
Status: DISCONTINUED | OUTPATIENT
Start: 2022-01-01 | End: 2022-01-01

## 2022-01-01 RX ORDER — HYDROCODONE BITARTRATE AND ACETAMINOPHEN 10; 325 MG/1; MG/1
1 TABLET ORAL EVERY 6 HOURS PRN
Status: DISCONTINUED | OUTPATIENT
Start: 2022-01-01 | End: 2022-01-01

## 2022-01-01 RX ORDER — LORAZEPAM 2 MG/ML
2 CONCENTRATE ORAL
Status: DISCONTINUED | OUTPATIENT
Start: 2022-01-01 | End: 2022-01-01 | Stop reason: HOSPADM

## 2022-01-01 RX ORDER — GLYCOPYRROLATE 0.2 MG/ML
0.2 INJECTION INTRAMUSCULAR; INTRAVENOUS
Status: DISCONTINUED | OUTPATIENT
Start: 2022-01-01 | End: 2022-01-01 | Stop reason: HOSPADM

## 2022-01-01 RX ORDER — SODIUM CHLORIDE 0.9 % (FLUSH) 0.9 %
20 SYRINGE (ML) INJECTION AS NEEDED
Status: CANCELLED | OUTPATIENT
Start: 2022-01-01

## 2022-01-01 RX ORDER — NICOTINE POLACRILEX 4 MG
15 LOZENGE BUCCAL
Status: DISCONTINUED | OUTPATIENT
Start: 2022-01-01 | End: 2022-01-01

## 2022-01-01 RX ORDER — GLYCOPYRROLATE 0.2 MG/ML
0.2 INJECTION INTRAMUSCULAR; INTRAVENOUS
Status: CANCELLED | OUTPATIENT
Start: 2022-01-01

## 2022-01-01 RX ORDER — VALPROIC ACID 250 MG/5ML
125 SOLUTION ORAL EVERY 8 HOURS SCHEDULED
Status: DISCONTINUED | OUTPATIENT
Start: 2022-01-01 | End: 2022-01-01

## 2022-01-01 RX ORDER — MORPHINE SULFATE 10 MG/ML
6 INJECTION INTRAMUSCULAR; INTRAVENOUS; SUBCUTANEOUS
Status: DISCONTINUED | OUTPATIENT
Start: 2022-01-01 | End: 2022-01-01 | Stop reason: HOSPADM

## 2022-01-01 RX ORDER — MORPHINE SULFATE 20 MG/ML
20 SOLUTION ORAL
Status: DISCONTINUED | OUTPATIENT
Start: 2022-01-01 | End: 2022-01-01 | Stop reason: HOSPADM

## 2022-01-01 RX ORDER — GLYCOPYRROLATE 0.2 MG/ML
0.2 INJECTION INTRAMUSCULAR; INTRAVENOUS ONCE
Status: COMPLETED | OUTPATIENT
Start: 2022-01-01 | End: 2022-01-01

## 2022-01-01 RX ORDER — LORAZEPAM 2 MG/ML
0.5 INJECTION INTRAMUSCULAR
Status: CANCELLED | OUTPATIENT
Start: 2022-01-01 | End: 2022-12-12

## 2022-01-01 RX ORDER — MORPHINE SULFATE 10 MG/ML
6 INJECTION INTRAMUSCULAR; INTRAVENOUS; SUBCUTANEOUS
Status: DISCONTINUED | OUTPATIENT
Start: 2022-01-01 | End: 2022-01-01

## 2022-01-01 RX ORDER — CHLORHEXIDINE GLUCONATE 0.12 MG/ML
15 RINSE ORAL EVERY 12 HOURS SCHEDULED
Status: DISCONTINUED | OUTPATIENT
Start: 2022-01-01 | End: 2022-01-01

## 2022-01-01 RX ORDER — ACETAMINOPHEN 160 MG/5ML
650 SOLUTION ORAL EVERY 4 HOURS PRN
Status: DISCONTINUED | OUTPATIENT
Start: 2022-01-01 | End: 2022-01-01 | Stop reason: HOSPADM

## 2022-01-01 RX ORDER — LORAZEPAM 2 MG/ML
1 INJECTION INTRAMUSCULAR
Status: CANCELLED | OUTPATIENT
Start: 2022-01-01 | End: 2022-12-12

## 2022-01-01 RX ORDER — PROMETHAZINE HYDROCHLORIDE 12.5 MG/1
12.5 SUPPOSITORY RECTAL EVERY 4 HOURS PRN
Status: DISCONTINUED | OUTPATIENT
Start: 2022-01-01 | End: 2022-01-01

## 2022-01-01 RX ORDER — MORPHINE SULFATE 4 MG/ML
4 INJECTION, SOLUTION INTRAMUSCULAR; INTRAVENOUS
Status: DISCONTINUED | OUTPATIENT
Start: 2022-01-01 | End: 2022-01-01 | Stop reason: HOSPADM

## 2022-01-01 RX ORDER — DIGOXIN 0.25 MG/ML
500 INJECTION INTRAMUSCULAR; INTRAVENOUS ONCE
Status: COMPLETED | OUTPATIENT
Start: 2022-01-01 | End: 2022-01-01

## 2022-01-01 RX ORDER — POLYETHYLENE GLYCOL 3350 17 G/17G
17 POWDER, FOR SOLUTION ORAL DAILY
Status: DISCONTINUED | OUTPATIENT
Start: 2022-01-01 | End: 2022-01-01

## 2022-01-01 RX ORDER — ROCURONIUM BROMIDE 10 MG/ML
100 INJECTION, SOLUTION INTRAVENOUS ONCE
Status: COMPLETED | OUTPATIENT
Start: 2022-01-01 | End: 2022-01-01

## 2022-01-01 RX ORDER — NOREPINEPHRINE BIT/0.9 % NACL 8 MG/250ML
.02-.3 INFUSION BOTTLE (ML) INTRAVENOUS
Status: DISCONTINUED | OUTPATIENT
Start: 2022-01-01 | End: 2022-01-01

## 2022-01-01 RX ORDER — LORAZEPAM 2 MG/ML
1 CONCENTRATE ORAL
Status: DISCONTINUED | OUTPATIENT
Start: 2022-01-01 | End: 2022-01-01 | Stop reason: HOSPADM

## 2022-01-01 RX ORDER — LORAZEPAM 2 MG/ML
1 INJECTION INTRAMUSCULAR
Status: DISCONTINUED | OUTPATIENT
Start: 2022-01-01 | End: 2022-01-01

## 2022-01-01 RX ORDER — TRAMADOL HYDROCHLORIDE 50 MG/1
50 TABLET ORAL 2 TIMES DAILY
COMMUNITY

## 2022-01-01 RX ORDER — MORPHINE SULFATE 20 MG/ML
10 SOLUTION ORAL
Status: CANCELLED | OUTPATIENT
Start: 2022-01-01 | End: 2022-12-12

## 2022-01-01 RX ORDER — DIPHENOXYLATE HYDROCHLORIDE AND ATROPINE SULFATE 2.5; .025 MG/1; MG/1
1 TABLET ORAL
Status: DISCONTINUED | OUTPATIENT
Start: 2022-01-01 | End: 2022-01-01

## 2022-01-01 RX ORDER — POTASSIUM CHLORIDE 750 MG/1
40 TABLET, FILM COATED, EXTENDED RELEASE ORAL AS NEEDED
Status: DISCONTINUED | OUTPATIENT
Start: 2022-01-01 | End: 2022-01-01

## 2022-01-01 RX ORDER — FAMOTIDINE 10 MG/ML
20 INJECTION, SOLUTION INTRAVENOUS 2 TIMES DAILY
Status: DISCONTINUED | OUTPATIENT
Start: 2022-01-01 | End: 2022-01-01

## 2022-01-01 RX ORDER — FENTANYL CITRATE 50 UG/ML
50 INJECTION, SOLUTION INTRAMUSCULAR; INTRAVENOUS
Status: DISCONTINUED | OUTPATIENT
Start: 2022-01-01 | End: 2022-01-01

## 2022-01-01 RX ORDER — SODIUM CHLORIDE 9 MG/ML
50 INJECTION, SOLUTION INTRAVENOUS CONTINUOUS
Status: DISCONTINUED | OUTPATIENT
Start: 2022-01-01 | End: 2022-01-01

## 2022-01-01 RX ORDER — BISACODYL 5 MG/1
5 TABLET, DELAYED RELEASE ORAL DAILY PRN
Status: DISCONTINUED | OUTPATIENT
Start: 2022-01-01 | End: 2022-01-01

## 2022-01-01 RX ORDER — LORAZEPAM 2 MG/ML
2 INJECTION INTRAMUSCULAR
Status: CANCELLED | OUTPATIENT
Start: 2022-01-01 | End: 2022-12-12

## 2022-01-01 RX ORDER — SCOLOPAMINE TRANSDERMAL SYSTEM 1 MG/1
1 PATCH, EXTENDED RELEASE TRANSDERMAL
Status: DISCONTINUED | OUTPATIENT
Start: 2022-01-01 | End: 2022-01-01

## 2022-01-01 RX ORDER — AMOXICILLIN 250 MG
2 CAPSULE ORAL 2 TIMES DAILY
Status: DISCONTINUED | OUTPATIENT
Start: 2022-01-01 | End: 2022-01-01

## 2022-01-01 RX ORDER — HALOPERIDOL 5 MG/ML
5 INJECTION INTRAMUSCULAR ONCE
Status: COMPLETED | OUTPATIENT
Start: 2022-01-01 | End: 2022-01-01

## 2022-01-01 RX ORDER — HALOPERIDOL 5 MG/ML
2 INJECTION INTRAMUSCULAR EVERY 6 HOURS PRN
Status: DISCONTINUED | OUTPATIENT
Start: 2022-01-01 | End: 2022-01-01

## 2022-01-01 RX ORDER — LORAZEPAM 0.5 MG/1
0.5 TABLET ORAL 2 TIMES DAILY
Status: DISCONTINUED | OUTPATIENT
Start: 2022-01-01 | End: 2022-01-01

## 2022-01-01 RX ORDER — FAMOTIDINE 20 MG/1
20 TABLET, FILM COATED ORAL
Status: DISCONTINUED | OUTPATIENT
Start: 2022-01-01 | End: 2022-01-01

## 2022-01-01 RX ORDER — LORAZEPAM 2 MG/ML
2 CONCENTRATE ORAL
Status: DISCONTINUED | OUTPATIENT
Start: 2022-01-01 | End: 2022-01-01

## 2022-01-01 RX ORDER — SODIUM CHLORIDE 0.9 % (FLUSH) 0.9 %
10 SYRINGE (ML) INJECTION EVERY 12 HOURS SCHEDULED
Status: CANCELLED | OUTPATIENT
Start: 2022-01-01

## 2022-01-01 RX ORDER — POLYETHYLENE GLYCOL 3350 17 G/17G
17 POWDER, FOR SOLUTION ORAL DAILY PRN
Status: DISCONTINUED | OUTPATIENT
Start: 2022-01-01 | End: 2022-01-01

## 2022-01-01 RX ORDER — DIPHENOXYLATE HYDROCHLORIDE AND ATROPINE SULFATE 2.5; .025 MG/1; MG/1
1 TABLET ORAL
Status: CANCELLED | OUTPATIENT
Start: 2022-01-01 | End: 2022-12-12

## 2022-01-01 RX ORDER — ACETAMINOPHEN 325 MG/1
650 TABLET ORAL EVERY 6 HOURS PRN
Status: DISCONTINUED | OUTPATIENT
Start: 2022-01-01 | End: 2022-01-01

## 2022-01-01 RX ORDER — DIVALPROEX SODIUM 125 MG/1
500 CAPSULE, COATED PELLETS ORAL ONCE
Status: DISCONTINUED | OUTPATIENT
Start: 2022-01-01 | End: 2022-01-01 | Stop reason: HOSPADM

## 2022-01-01 RX ORDER — TRAMADOL HYDROCHLORIDE 50 MG/1
50 TABLET ORAL 2 TIMES DAILY
Status: DISCONTINUED | OUTPATIENT
Start: 2022-01-01 | End: 2022-01-01

## 2022-01-01 RX ORDER — ACETAMINOPHEN 160 MG/5ML
650 SOLUTION ORAL EVERY 4 HOURS PRN
Status: CANCELLED | OUTPATIENT
Start: 2022-01-01

## 2022-01-01 RX ORDER — MEMANTINE HYDROCHLORIDE 10 MG/1
10 TABLET ORAL 2 TIMES DAILY
Status: DISCONTINUED | OUTPATIENT
Start: 2022-01-01 | End: 2022-01-01

## 2022-01-01 RX ORDER — SCOLOPAMINE TRANSDERMAL SYSTEM 1 MG/1
1 PATCH, EXTENDED RELEASE TRANSDERMAL
Status: DISCONTINUED | OUTPATIENT
Start: 2022-01-01 | End: 2022-01-01 | Stop reason: HOSPADM

## 2022-01-01 RX ORDER — GLYCOPYRROLATE 0.2 MG/ML
0.2 INJECTION INTRAMUSCULAR; INTRAVENOUS
Status: DISCONTINUED | OUTPATIENT
Start: 2022-01-01 | End: 2022-01-01

## 2022-01-01 RX ORDER — LORAZEPAM 2 MG/ML
0.5 CONCENTRATE ORAL
Status: DISCONTINUED | OUTPATIENT
Start: 2022-01-01 | End: 2022-01-01 | Stop reason: HOSPADM

## 2022-01-01 RX ORDER — ACETAMINOPHEN 325 MG/1
650 TABLET ORAL EVERY 4 HOURS PRN
Status: DISCONTINUED | OUTPATIENT
Start: 2022-01-01 | End: 2022-01-01 | Stop reason: HOSPADM

## 2022-01-01 RX ORDER — FENTANYL CITRATE 50 UG/ML
100 INJECTION, SOLUTION INTRAMUSCULAR; INTRAVENOUS
Status: DISCONTINUED | OUTPATIENT
Start: 2022-01-01 | End: 2022-01-01

## 2022-01-01 RX ORDER — PROMETHAZINE HYDROCHLORIDE 25 MG/1
12.5 TABLET ORAL EVERY 4 HOURS PRN
Status: CANCELLED | OUTPATIENT
Start: 2022-01-01

## 2022-01-01 RX ORDER — ETOMIDATE 2 MG/ML
30 INJECTION INTRAVENOUS ONCE
Status: COMPLETED | OUTPATIENT
Start: 2022-01-01 | End: 2022-01-01

## 2022-01-01 RX ORDER — SODIUM CHLORIDE 0.9 % (FLUSH) 0.9 %
10 SYRINGE (ML) INJECTION EVERY 12 HOURS SCHEDULED
Status: DISCONTINUED | OUTPATIENT
Start: 2022-01-01 | End: 2022-01-01 | Stop reason: HOSPADM

## 2022-01-01 RX ORDER — TRAZODONE HYDROCHLORIDE 50 MG/1
50 TABLET ORAL 2 TIMES DAILY
COMMUNITY

## 2022-01-01 RX ORDER — HYDROMORPHONE HCL 110MG/55ML
1.5 PATIENT CONTROLLED ANALGESIA SYRINGE INTRAVENOUS
Status: DISCONTINUED | OUTPATIENT
Start: 2022-01-01 | End: 2022-01-01 | Stop reason: HOSPADM

## 2022-01-01 RX ORDER — PROMETHAZINE HYDROCHLORIDE 12.5 MG/1
12.5 TABLET ORAL EVERY 6 HOURS PRN
COMMUNITY

## 2022-01-01 RX ORDER — ENOXAPARIN SODIUM 100 MG/ML
1 INJECTION SUBCUTANEOUS EVERY 12 HOURS
Status: DISCONTINUED | OUTPATIENT
Start: 2022-01-01 | End: 2022-01-01

## 2022-01-01 RX ORDER — ACETAMINOPHEN 325 MG/1
650 TABLET ORAL EVERY 4 HOURS PRN
Status: DISCONTINUED | OUTPATIENT
Start: 2022-01-01 | End: 2022-01-01

## 2022-01-01 RX ORDER — GLYCOPYRROLATE 0.2 MG/ML
0.4 INJECTION INTRAMUSCULAR; INTRAVENOUS
Status: DISCONTINUED | OUTPATIENT
Start: 2022-01-01 | End: 2022-01-01 | Stop reason: HOSPADM

## 2022-01-01 RX ORDER — MORPHINE SULFATE 20 MG/ML
20 SOLUTION ORAL
Status: CANCELLED | OUTPATIENT
Start: 2022-01-01 | End: 2022-12-12

## 2022-01-01 RX ORDER — LORAZEPAM 2 MG/ML
2 INJECTION INTRAMUSCULAR
Status: DISCONTINUED | OUTPATIENT
Start: 2022-01-01 | End: 2022-01-01 | Stop reason: HOSPADM

## 2022-01-01 RX ORDER — LORAZEPAM 2 MG/ML
2 INJECTION INTRAMUSCULAR
Status: DISCONTINUED | OUTPATIENT
Start: 2022-01-01 | End: 2022-01-01

## 2022-01-01 RX ORDER — ACETAMINOPHEN 650 MG/1
650 SUPPOSITORY RECTAL EVERY 4 HOURS PRN
Status: DISCONTINUED | OUTPATIENT
Start: 2022-01-01 | End: 2022-01-01

## 2022-01-01 RX ORDER — HEPARIN SODIUM 10000 [USP'U]/100ML
18 INJECTION, SOLUTION INTRAVENOUS
Status: DISCONTINUED | OUTPATIENT
Start: 2022-01-01 | End: 2022-01-01

## 2022-01-01 RX ORDER — GLYCOPYRROLATE 0.2 MG/ML
0.4 INJECTION INTRAMUSCULAR; INTRAVENOUS
Status: DISCONTINUED | OUTPATIENT
Start: 2022-01-01 | End: 2022-01-01

## 2022-01-01 RX ORDER — PROMETHAZINE HYDROCHLORIDE 6.25 MG/5ML
12.5 SYRUP ORAL EVERY 4 HOURS PRN
Status: CANCELLED | OUTPATIENT
Start: 2022-01-01

## 2022-01-01 RX ORDER — ACETAMINOPHEN 325 MG/1
650 TABLET ORAL EVERY 6 HOURS PRN
COMMUNITY

## 2022-01-01 RX ORDER — ACETAMINOPHEN 650 MG/1
650 SUPPOSITORY RECTAL EVERY 4 HOURS PRN
Status: DISCONTINUED | OUTPATIENT
Start: 2022-01-01 | End: 2022-01-01 | Stop reason: HOSPADM

## 2022-01-01 RX ORDER — LORAZEPAM 2 MG/ML
0.5 INJECTION INTRAMUSCULAR
Status: DISCONTINUED | OUTPATIENT
Start: 2022-01-01 | End: 2022-01-01 | Stop reason: HOSPADM

## 2022-01-01 RX ORDER — SODIUM CHLORIDE 0.9 % (FLUSH) 0.9 %
10 SYRINGE (ML) INJECTION AS NEEDED
Status: CANCELLED | OUTPATIENT
Start: 2022-01-01

## 2022-01-01 RX ORDER — CITALOPRAM 10 MG/1
10 TABLET ORAL NIGHTLY
Status: DISCONTINUED | OUTPATIENT
Start: 2022-01-01 | End: 2022-01-01

## 2022-01-01 RX ORDER — MORPHINE SULFATE 20 MG/ML
10 SOLUTION ORAL
Status: DISCONTINUED | OUTPATIENT
Start: 2022-01-01 | End: 2022-01-01

## 2022-01-01 RX ORDER — PROMETHAZINE HYDROCHLORIDE 25 MG/1
12.5 TABLET ORAL EVERY 4 HOURS PRN
Status: DISCONTINUED | OUTPATIENT
Start: 2022-01-01 | End: 2022-01-01 | Stop reason: HOSPADM

## 2022-01-01 RX ORDER — LORAZEPAM 2 MG/ML
0.5 CONCENTRATE ORAL
Status: CANCELLED | OUTPATIENT
Start: 2022-01-01 | End: 2022-12-12

## 2022-01-01 RX ORDER — LORAZEPAM 2 MG/ML
0.5 INJECTION INTRAMUSCULAR
Status: DISCONTINUED | OUTPATIENT
Start: 2022-01-01 | End: 2022-01-01

## 2022-01-01 RX ORDER — PROMETHAZINE HYDROCHLORIDE 12.5 MG/1
12.5 SUPPOSITORY RECTAL EVERY 4 HOURS PRN
Status: DISCONTINUED | OUTPATIENT
Start: 2022-01-01 | End: 2022-01-01 | Stop reason: HOSPADM

## 2022-01-01 RX ORDER — HEPARIN SODIUM 5000 [USP'U]/ML
80 INJECTION, SOLUTION INTRAVENOUS; SUBCUTANEOUS ONCE
Status: COMPLETED | OUTPATIENT
Start: 2022-01-01 | End: 2022-01-01

## 2022-01-01 RX ORDER — MORPHINE SULFATE 4 MG/ML
4 INJECTION, SOLUTION INTRAMUSCULAR; INTRAVENOUS
Status: DISCONTINUED | OUTPATIENT
Start: 2022-01-01 | End: 2022-01-01

## 2022-01-01 RX ORDER — HYDROCODONE BITARTRATE AND ACETAMINOPHEN 10; 325 MG/1; MG/1
1 TABLET ORAL EVERY 8 HOURS
Status: DISCONTINUED | OUTPATIENT
Start: 2022-01-01 | End: 2022-01-01

## 2022-01-01 RX ORDER — DIPHENOXYLATE HYDROCHLORIDE AND ATROPINE SULFATE 2.5; .025 MG/1; MG/1
1 TABLET ORAL
Status: DISCONTINUED | OUTPATIENT
Start: 2022-01-01 | End: 2022-01-01 | Stop reason: HOSPADM

## 2022-01-01 RX ORDER — GLYCOPYRROLATE 0.2 MG/ML
0.8 INJECTION INTRAMUSCULAR; INTRAVENOUS
Status: DISCONTINUED | OUTPATIENT
Start: 2022-01-01 | End: 2022-01-01 | Stop reason: HOSPADM

## 2022-01-01 RX ORDER — MORPHINE SULFATE 20 MG/ML
10 SOLUTION ORAL
Status: DISCONTINUED | OUTPATIENT
Start: 2022-01-01 | End: 2022-01-01 | Stop reason: HOSPADM

## 2022-01-01 RX ORDER — ATROPINE SULFATE 10 MG/ML
2 SOLUTION/ DROPS OPHTHALMIC 2 TIMES DAILY PRN
Status: DISCONTINUED | OUTPATIENT
Start: 2022-01-01 | End: 2022-01-01 | Stop reason: HOSPADM

## 2022-01-01 RX ORDER — PRIMIDONE 50 MG/1
100 TABLET ORAL EVERY 8 HOURS SCHEDULED
Status: DISCONTINUED | OUTPATIENT
Start: 2022-01-01 | End: 2022-01-01

## 2022-01-01 RX ORDER — HYDROMORPHONE HCL 110MG/55ML
2 PATIENT CONTROLLED ANALGESIA SYRINGE INTRAVENOUS
Status: DISCONTINUED | OUTPATIENT
Start: 2022-01-01 | End: 2022-01-01 | Stop reason: HOSPADM

## 2022-01-01 RX ORDER — LORAZEPAM 2 MG/ML
0.5 CONCENTRATE ORAL
Status: DISCONTINUED | OUTPATIENT
Start: 2022-01-01 | End: 2022-01-01

## 2022-01-01 RX ORDER — PHENYLEPHRINE HCL IN 0.9% NACL 0.5 MG/5ML
.5-3 SYRINGE (ML) INTRAVENOUS
Status: DISCONTINUED | OUTPATIENT
Start: 2022-01-01 | End: 2022-01-01

## 2022-01-01 RX ORDER — PROMETHAZINE HYDROCHLORIDE 12.5 MG/1
12.5 SUPPOSITORY RECTAL EVERY 4 HOURS PRN
Status: CANCELLED | OUTPATIENT
Start: 2022-01-01

## 2022-01-01 RX ORDER — DEXTROSE MONOHYDRATE 25 G/50ML
INJECTION, SOLUTION INTRAVENOUS
Status: COMPLETED
Start: 2022-01-01 | End: 2022-01-01

## 2022-01-01 RX ORDER — HEPARIN SODIUM 5000 [USP'U]/ML
40-80 INJECTION, SOLUTION INTRAVENOUS; SUBCUTANEOUS EVERY 6 HOURS PRN
Status: DISCONTINUED | OUTPATIENT
Start: 2022-01-01 | End: 2022-01-01

## 2022-01-01 RX ORDER — ACETAMINOPHEN 650 MG/1
650 SUPPOSITORY RECTAL EVERY 4 HOURS PRN
Status: CANCELLED | OUTPATIENT
Start: 2022-01-01

## 2022-01-01 RX ORDER — MORPHINE SULFATE 10 MG/ML
6 INJECTION INTRAMUSCULAR; INTRAVENOUS; SUBCUTANEOUS
Status: CANCELLED | OUTPATIENT
Start: 2022-01-01 | End: 2022-12-12

## 2022-01-01 RX ORDER — AMOXICILLIN 250 MG
2 CAPSULE ORAL 2 TIMES DAILY PRN
Status: DISCONTINUED | OUTPATIENT
Start: 2022-01-01 | End: 2022-01-01

## 2022-01-01 RX ORDER — ATROPINE SULFATE 10 MG/ML
2 SOLUTION/ DROPS OPHTHALMIC 2 TIMES DAILY PRN
Status: CANCELLED | OUTPATIENT
Start: 2022-01-01

## 2022-01-01 RX ORDER — LORAZEPAM 2 MG/ML
1 INJECTION INTRAMUSCULAR
Status: DISCONTINUED | OUTPATIENT
Start: 2022-01-01 | End: 2022-01-01 | Stop reason: HOSPADM

## 2022-01-01 RX ORDER — LORAZEPAM 1 MG/1
1 TABLET ORAL EVERY 8 HOURS
Status: DISCONTINUED | OUTPATIENT
Start: 2022-01-01 | End: 2022-01-01

## 2022-01-01 RX ORDER — ALBUTEROL SULFATE 2.5 MG/3ML
2.5 SOLUTION RESPIRATORY (INHALATION)
Status: DISCONTINUED | OUTPATIENT
Start: 2022-01-01 | End: 2022-01-01

## 2022-01-01 RX ORDER — IBUPROFEN 200 MG
1 TABLET ORAL DAILY
COMMUNITY

## 2022-01-01 RX ORDER — DIVALPROEX SODIUM 125 MG/1
125 CAPSULE, COATED PELLETS ORAL 3 TIMES DAILY
COMMUNITY

## 2022-01-01 RX ORDER — LORAZEPAM 0.5 MG/1
0.5 TABLET ORAL 2 TIMES DAILY
COMMUNITY

## 2022-01-01 RX ORDER — MORPHINE SULFATE 4 MG/ML
4 INJECTION, SOLUTION INTRAMUSCULAR; INTRAVENOUS
Status: CANCELLED | OUTPATIENT
Start: 2022-01-01 | End: 2022-12-12

## 2022-01-01 RX ORDER — ACETAMINOPHEN 325 MG/1
650 TABLET ORAL EVERY 4 HOURS PRN
Status: CANCELLED | OUTPATIENT
Start: 2022-01-01

## 2022-01-01 RX ORDER — DIVALPROEX SODIUM 125 MG/1
250 CAPSULE, COATED PELLETS ORAL ONCE
Status: DISCONTINUED | OUTPATIENT
Start: 2022-01-01 | End: 2022-01-01 | Stop reason: HOSPADM

## 2022-01-01 RX ORDER — DEXTROSE MONOHYDRATE 25 G/50ML
25 INJECTION, SOLUTION INTRAVENOUS
Status: DISCONTINUED | OUTPATIENT
Start: 2022-01-01 | End: 2022-01-01

## 2022-01-01 RX ORDER — POTASSIUM CHLORIDE 1.5 G/1.77G
40 POWDER, FOR SOLUTION ORAL AS NEEDED
Status: DISCONTINUED | OUTPATIENT
Start: 2022-01-01 | End: 2022-01-01

## 2022-01-01 RX ORDER — HALOPERIDOL 1 MG/1
1 TABLET ORAL EVERY 8 HOURS SCHEDULED
Status: DISCONTINUED | OUTPATIENT
Start: 2022-01-01 | End: 2022-01-01

## 2022-01-01 RX ORDER — OMEPRAZOLE 20 MG/1
20 CAPSULE, DELAYED RELEASE ORAL 2 TIMES DAILY
COMMUNITY

## 2022-01-01 RX ORDER — MORPHINE SULFATE 20 MG/ML
20 SOLUTION ORAL
Status: DISCONTINUED | OUTPATIENT
Start: 2022-01-01 | End: 2022-01-01

## 2022-01-01 RX ORDER — ATROPINE SULFATE 10 MG/ML
2 SOLUTION/ DROPS OPHTHALMIC 2 TIMES DAILY PRN
Status: DISCONTINUED | OUTPATIENT
Start: 2022-01-01 | End: 2022-01-01

## 2022-01-01 RX ORDER — HYDROMORPHONE HCL 110MG/55ML
1.5 PATIENT CONTROLLED ANALGESIA SYRINGE INTRAVENOUS
Status: DISCONTINUED | OUTPATIENT
Start: 2022-01-01 | End: 2022-01-01

## 2022-01-01 RX ORDER — MAGNESIUM SULFATE HEPTAHYDRATE 40 MG/ML
4 INJECTION, SOLUTION INTRAVENOUS AS NEEDED
Status: DISCONTINUED | OUTPATIENT
Start: 2022-01-01 | End: 2022-01-01

## 2022-01-01 RX ORDER — SODIUM CHLORIDE 9 MG/ML
40 INJECTION, SOLUTION INTRAVENOUS AS NEEDED
Status: DISCONTINUED | OUTPATIENT
Start: 2022-01-01 | End: 2022-01-01 | Stop reason: HOSPADM

## 2022-01-01 RX ORDER — HYDROMORPHONE HCL 110MG/55ML
1.5 PATIENT CONTROLLED ANALGESIA SYRINGE INTRAVENOUS
Status: CANCELLED | OUTPATIENT
Start: 2022-01-01 | End: 2022-12-12

## 2022-01-01 RX ORDER — POTASSIUM CHLORIDE 29.8 MG/ML
20 INJECTION INTRAVENOUS
Status: DISCONTINUED | OUTPATIENT
Start: 2022-01-01 | End: 2022-01-01

## 2022-01-01 RX ORDER — PROMETHAZINE HYDROCHLORIDE 6.25 MG/5ML
12.5 SYRUP ORAL EVERY 4 HOURS PRN
Status: DISCONTINUED | OUTPATIENT
Start: 2022-01-01 | End: 2022-01-01 | Stop reason: HOSPADM

## 2022-01-01 RX ORDER — DEXMEDETOMIDINE HYDROCHLORIDE 4 UG/ML
.2-1.5 INJECTION, SOLUTION INTRAVENOUS
Status: DISCONTINUED | OUTPATIENT
Start: 2022-01-01 | End: 2022-01-01

## 2022-01-01 RX ORDER — PROMETHAZINE HYDROCHLORIDE 25 MG/1
12.5 TABLET ORAL EVERY 4 HOURS PRN
Status: DISCONTINUED | OUTPATIENT
Start: 2022-01-01 | End: 2022-01-01

## 2022-01-01 RX ORDER — PRIMIDONE 50 MG/1
50 TABLET ORAL DAILY
Status: DISCONTINUED | OUTPATIENT
Start: 2022-01-01 | End: 2022-01-01

## 2022-01-01 RX ORDER — ACETAMINOPHEN 160 MG/5ML
650 SOLUTION ORAL EVERY 4 HOURS PRN
Status: DISCONTINUED | OUTPATIENT
Start: 2022-01-01 | End: 2022-01-01

## 2022-01-01 RX ORDER — LORAZEPAM 2 MG/ML
2 CONCENTRATE ORAL
Status: CANCELLED | OUTPATIENT
Start: 2022-01-01 | End: 2022-12-12

## 2022-01-01 RX ORDER — LORAZEPAM 2 MG/ML
1 CONCENTRATE ORAL
Status: CANCELLED | OUTPATIENT
Start: 2022-01-01 | End: 2022-12-12

## 2022-01-01 RX ADMIN — ALBUTEROL SULFATE 2.5 MG: 2.5 SOLUTION RESPIRATORY (INHALATION) at 12:36

## 2022-01-01 RX ADMIN — GLYCOPYRROLATE 0.4 MG: 0.2 INJECTION INTRAMUSCULAR; INTRAVENOUS at 13:09

## 2022-01-01 RX ADMIN — ALBUTEROL SULFATE 2.5 MG: 2.5 SOLUTION RESPIRATORY (INHALATION) at 15:10

## 2022-01-01 RX ADMIN — LORAZEPAM 1 MG: 2 INJECTION INTRAMUSCULAR; INTRAVENOUS at 00:22

## 2022-01-01 RX ADMIN — ALBUTEROL SULFATE 2.5 MG: 2.5 SOLUTION RESPIRATORY (INHALATION) at 10:49

## 2022-01-01 RX ADMIN — HALOPERIDOL 1 MG: 1 TABLET ORAL at 15:00

## 2022-01-01 RX ADMIN — VANCOMYCIN HYDROCHLORIDE 1750 MG: 10 INJECTION, POWDER, LYOPHILIZED, FOR SOLUTION INTRAVENOUS at 12:39

## 2022-01-01 RX ADMIN — LORAZEPAM 1 MG: 1 TABLET ORAL at 08:48

## 2022-01-01 RX ADMIN — TRAMADOL HYDROCHLORIDE 50 MG: 50 TABLET, COATED ORAL at 12:34

## 2022-01-01 RX ADMIN — ETOMIDATE 30 MG: 2 INJECTION, SOLUTION INTRAVENOUS at 10:14

## 2022-01-01 RX ADMIN — ALBUTEROL SULFATE 2.5 MG: 2.5 SOLUTION RESPIRATORY (INHALATION) at 16:28

## 2022-01-01 RX ADMIN — GLYCOPYRROLATE 0.4 MG: 0.2 INJECTION INTRAMUSCULAR; INTRAVENOUS at 16:13

## 2022-01-01 RX ADMIN — VALPROIC ACID 125 MG: 250 SOLUTION ORAL at 21:07

## 2022-01-01 RX ADMIN — CHLORHEXIDINE GLUCONATE 15 ML: 1.2 RINSE ORAL at 08:47

## 2022-01-01 RX ADMIN — GLYCOPYRROLATE 0.2 MG: 0.2 INJECTION INTRAMUSCULAR; INTRAVENOUS at 17:05

## 2022-01-01 RX ADMIN — VALPROIC ACID 125 MG: 250 SOLUTION ORAL at 05:15

## 2022-01-01 RX ADMIN — POTASSIUM CHLORIDE 40 MEQ: 1.5 POWDER, FOR SOLUTION ORAL at 10:40

## 2022-01-01 RX ADMIN — TRAMADOL HYDROCHLORIDE 50 MG: 50 TABLET, COATED ORAL at 09:02

## 2022-01-01 RX ADMIN — HYDROMORPHONE HYDROCHLORIDE 2 MG: 2 INJECTION, SOLUTION INTRAMUSCULAR; INTRAVENOUS; SUBCUTANEOUS at 18:40

## 2022-01-01 RX ADMIN — GLYCOPYRROLATE 0.4 MG: 0.2 INJECTION INTRAMUSCULAR; INTRAVENOUS at 04:31

## 2022-01-01 RX ADMIN — GLYCOPYRROLATE 0.4 MG: 0.2 INJECTION INTRAMUSCULAR; INTRAVENOUS at 00:33

## 2022-01-01 RX ADMIN — MEMANTINE HYDROCHLORIDE 10 MG: 10 TABLET, FILM COATED ORAL at 22:33

## 2022-01-01 RX ADMIN — FENTANYL CITRATE 50 MCG: 50 INJECTION, SOLUTION INTRAMUSCULAR; INTRAVENOUS at 02:52

## 2022-01-01 RX ADMIN — ALBUTEROL SULFATE 2.5 MG: 2.5 SOLUTION RESPIRATORY (INHALATION) at 20:31

## 2022-01-01 RX ADMIN — Medication 0.06 MCG/KG/MIN: at 04:00

## 2022-01-01 RX ADMIN — Medication 10 ML: at 08:32

## 2022-01-01 RX ADMIN — GLYCOPYRROLATE 0.4 MG: 0.2 INJECTION INTRAMUSCULAR; INTRAVENOUS at 16:56

## 2022-01-01 RX ADMIN — MEMANTINE HYDROCHLORIDE 10 MG: 10 TABLET, FILM COATED ORAL at 08:49

## 2022-01-01 RX ADMIN — TRAMADOL HYDROCHLORIDE 50 MG: 50 TABLET, COATED ORAL at 20:17

## 2022-01-01 RX ADMIN — MORPHINE SULFATE 4 MG: 4 INJECTION, SOLUTION INTRAMUSCULAR; INTRAVENOUS at 17:05

## 2022-01-01 RX ADMIN — TRAMADOL HYDROCHLORIDE 50 MG: 50 TABLET, COATED ORAL at 21:49

## 2022-01-01 RX ADMIN — HEPARIN SODIUM 16 UNITS/KG/HR: 10000 INJECTION, SOLUTION INTRAVENOUS at 06:58

## 2022-01-01 RX ADMIN — GLYCOPYRROLATE 0.4 MG: 0.2 INJECTION INTRAMUSCULAR; INTRAVENOUS at 19:55

## 2022-01-01 RX ADMIN — FAMOTIDINE 20 MG: 20 TABLET ORAL at 06:33

## 2022-01-01 RX ADMIN — TRAMADOL HYDROCHLORIDE 50 MG: 50 TABLET, COATED ORAL at 21:05

## 2022-01-01 RX ADMIN — HYDROMORPHONE HYDROCHLORIDE 2 MG: 2 INJECTION, SOLUTION INTRAMUSCULAR; INTRAVENOUS; SUBCUTANEOUS at 20:30

## 2022-01-01 RX ADMIN — HYDROCODONE BITARTRATE AND ACETAMINOPHEN 1 TABLET: 10; 325 TABLET ORAL at 20:59

## 2022-01-01 RX ADMIN — SODIUM CHLORIDE 1000 ML: 9 INJECTION, SOLUTION INTRAVENOUS at 10:25

## 2022-01-01 RX ADMIN — LORAZEPAM 2 MG: 2 INJECTION INTRAMUSCULAR; INTRAVENOUS at 18:40

## 2022-01-01 RX ADMIN — FAMOTIDINE 20 MG: 20 TABLET ORAL at 16:59

## 2022-01-01 RX ADMIN — LORAZEPAM 2 MG: 2 INJECTION INTRAMUSCULAR; INTRAVENOUS at 12:27

## 2022-01-01 RX ADMIN — PRIMIDONE 50 MG: 50 TABLET ORAL at 08:37

## 2022-01-01 RX ADMIN — GLYCOPYRROLATE 0.2 MG: 0.2 INJECTION INTRAMUSCULAR; INTRAVENOUS at 00:22

## 2022-01-01 RX ADMIN — ALBUTEROL SULFATE 2.5 MG: 2.5 SOLUTION RESPIRATORY (INHALATION) at 16:19

## 2022-01-01 RX ADMIN — SODIUM CHLORIDE 750 MG: 900 INJECTION, SOLUTION INTRAVENOUS at 22:28

## 2022-01-01 RX ADMIN — LORAZEPAM 1 MG: 1 TABLET ORAL at 16:59

## 2022-01-01 RX ADMIN — HYDROMORPHONE HYDROCHLORIDE 2 MG: 2 INJECTION, SOLUTION INTRAMUSCULAR; INTRAVENOUS; SUBCUTANEOUS at 00:34

## 2022-01-01 RX ADMIN — ENOXAPARIN SODIUM 90 MG: 100 INJECTION SUBCUTANEOUS at 02:31

## 2022-01-01 RX ADMIN — CHLORHEXIDINE GLUCONATE 15 ML: 1.2 RINSE ORAL at 08:48

## 2022-01-01 RX ADMIN — CITALOPRAM 10 MG: 10 TABLET, FILM COATED ORAL at 21:04

## 2022-01-01 RX ADMIN — TRAMADOL HYDROCHLORIDE 50 MG: 50 TABLET, COATED ORAL at 08:57

## 2022-01-01 RX ADMIN — ALBUTEROL SULFATE 2.5 MG: 2.5 SOLUTION RESPIRATORY (INHALATION) at 22:51

## 2022-01-01 RX ADMIN — ALBUTEROL SULFATE 2.5 MG: 2.5 SOLUTION RESPIRATORY (INHALATION) at 08:15

## 2022-01-01 RX ADMIN — Medication 10 ML: at 08:48

## 2022-01-01 RX ADMIN — FENTANYL CITRATE 50 MCG: 50 INJECTION, SOLUTION INTRAMUSCULAR; INTRAVENOUS at 22:03

## 2022-01-01 RX ADMIN — ALBUTEROL SULFATE 2.5 MG: 2.5 SOLUTION RESPIRATORY (INHALATION) at 11:12

## 2022-01-01 RX ADMIN — LORAZEPAM 2 MG: 2 INJECTION INTRAMUSCULAR; INTRAVENOUS at 04:54

## 2022-01-01 RX ADMIN — CEFEPIME 2 G: 2 INJECTION, POWDER, FOR SOLUTION INTRAVENOUS at 03:47

## 2022-01-01 RX ADMIN — FENTANYL CITRATE 50 MCG: 50 INJECTION, SOLUTION INTRAMUSCULAR; INTRAVENOUS at 18:14

## 2022-01-01 RX ADMIN — CHLORHEXIDINE GLUCONATE 15 ML: 1.2 RINSE ORAL at 20:43

## 2022-01-01 RX ADMIN — TRAMADOL HYDROCHLORIDE 50 MG: 50 TABLET, COATED ORAL at 21:47

## 2022-01-01 RX ADMIN — Medication 2 PACKET: at 12:06

## 2022-01-01 RX ADMIN — HALOPERIDOL 1 MG: 1 TABLET ORAL at 14:34

## 2022-01-01 RX ADMIN — LORAZEPAM 2 MG: 2 INJECTION INTRAMUSCULAR; INTRAVENOUS at 00:34

## 2022-01-01 RX ADMIN — LORAZEPAM 2 MG: 2 INJECTION INTRAMUSCULAR; INTRAVENOUS at 00:50

## 2022-01-01 RX ADMIN — HALOPERIDOL 1 MG: 1 TABLET ORAL at 14:16

## 2022-01-01 RX ADMIN — FAMOTIDINE 20 MG: 10 INJECTION INTRAVENOUS at 20:43

## 2022-01-01 RX ADMIN — ENOXAPARIN SODIUM 90 MG: 100 INJECTION SUBCUTANEOUS at 01:24

## 2022-01-01 RX ADMIN — HYDROMORPHONE HYDROCHLORIDE 1 MG: 1 INJECTION, SOLUTION INTRAMUSCULAR; INTRAVENOUS; SUBCUTANEOUS at 13:09

## 2022-01-01 RX ADMIN — LORAZEPAM 0.5 MG: 0.5 TABLET ORAL at 10:45

## 2022-01-01 RX ADMIN — ROCURONIUM BROMIDE 100 MG: 10 INJECTION, SOLUTION INTRAVENOUS at 10:14

## 2022-01-01 RX ADMIN — LORAZEPAM 1 MG: 2 INJECTION INTRAMUSCULAR; INTRAVENOUS at 13:37

## 2022-01-01 RX ADMIN — SODIUM CHLORIDE 1000 ML: 9 INJECTION, SOLUTION INTRAVENOUS at 15:08

## 2022-01-01 RX ADMIN — MEMANTINE HYDROCHLORIDE 10 MG: 10 TABLET, FILM COATED ORAL at 08:37

## 2022-01-01 RX ADMIN — LORAZEPAM 1 MG: 1 TABLET ORAL at 00:09

## 2022-01-01 RX ADMIN — LORAZEPAM 0.5 MG: 0.5 TABLET ORAL at 21:04

## 2022-01-01 RX ADMIN — GLYCOPYRROLATE 0.4 MG: 0.2 INJECTION INTRAMUSCULAR; INTRAVENOUS at 00:18

## 2022-01-01 RX ADMIN — FAMOTIDINE 20 MG: 20 TABLET ORAL at 08:55

## 2022-01-01 RX ADMIN — LORAZEPAM 0.5 MG: 0.5 TABLET ORAL at 20:17

## 2022-01-01 RX ADMIN — FENTANYL CITRATE 50 MCG: 50 INJECTION, SOLUTION INTRAMUSCULAR; INTRAVENOUS at 05:51

## 2022-01-01 RX ADMIN — GLYCOPYRROLATE 0.2 MG: 0.2 INJECTION INTRAMUSCULAR; INTRAVENOUS at 20:42

## 2022-01-01 RX ADMIN — LORAZEPAM 2 MG: 2 INJECTION INTRAMUSCULAR; INTRAVENOUS at 20:42

## 2022-01-01 RX ADMIN — FENTANYL CITRATE 50 MCG: 50 INJECTION, SOLUTION INTRAMUSCULAR; INTRAVENOUS at 16:47

## 2022-01-01 RX ADMIN — CEFEPIME 2 G: 2 INJECTION, POWDER, FOR SOLUTION INTRAVENOUS at 20:17

## 2022-01-01 RX ADMIN — ALBUTEROL SULFATE 2.5 MG: 2.5 SOLUTION RESPIRATORY (INHALATION) at 09:17

## 2022-01-01 RX ADMIN — HYDROMORPHONE HYDROCHLORIDE 2 MG: 2 INJECTION, SOLUTION INTRAMUSCULAR; INTRAVENOUS; SUBCUTANEOUS at 04:54

## 2022-01-01 RX ADMIN — IOPAMIDOL 85 ML: 755 INJECTION, SOLUTION INTRAVENOUS at 13:30

## 2022-01-01 RX ADMIN — ALBUTEROL SULFATE 2.5 MG: 2.5 SOLUTION RESPIRATORY (INHALATION) at 23:03

## 2022-01-01 RX ADMIN — FAMOTIDINE 20 MG: 20 TABLET ORAL at 08:49

## 2022-01-01 RX ADMIN — HEPARIN SODIUM 18 UNITS/KG/HR: 10000 INJECTION, SOLUTION INTRAVENOUS at 15:11

## 2022-01-01 RX ADMIN — GLYCOPYRROLATE 0.2 MG: 0.2 INJECTION INTRAMUSCULAR; INTRAVENOUS at 04:28

## 2022-01-01 RX ADMIN — LORAZEPAM 2 MG: 2 INJECTION INTRAMUSCULAR; INTRAVENOUS at 21:05

## 2022-01-01 RX ADMIN — GLYCOPYRROLATE 0.2 MG: 0.2 INJECTION INTRAMUSCULAR; INTRAVENOUS at 04:48

## 2022-01-01 RX ADMIN — LORAZEPAM 2 MG: 2 INJECTION INTRAMUSCULAR; INTRAVENOUS at 16:39

## 2022-01-01 RX ADMIN — AZITHROMYCIN 500 MG: 500 INJECTION, POWDER, LYOPHILIZED, FOR SOLUTION INTRAVENOUS at 14:53

## 2022-01-01 RX ADMIN — LORAZEPAM 1 MG: 2 INJECTION, SOLUTION INTRAMUSCULAR; INTRAVENOUS at 09:52

## 2022-01-01 RX ADMIN — PRIMIDONE 50 MG: 50 TABLET ORAL at 12:53

## 2022-01-01 RX ADMIN — DOCUSATE SODIUM 50MG AND SENNOSIDES 8.6MG 2 TABLET: 8.6; 5 TABLET, FILM COATED ORAL at 02:23

## 2022-01-01 RX ADMIN — CEFEPIME 2 G: 2 INJECTION, POWDER, FOR SOLUTION INTRAVENOUS at 11:13

## 2022-01-01 RX ADMIN — ACETAMINOPHEN 650 MG: 325 TABLET, FILM COATED ORAL at 06:33

## 2022-01-01 RX ADMIN — GLYCOPYRROLATE 0.2 MG: 0.2 INJECTION INTRAMUSCULAR; INTRAVENOUS at 09:48

## 2022-01-01 RX ADMIN — MORPHINE SULFATE 4 MG: 4 INJECTION, SOLUTION INTRAMUSCULAR; INTRAVENOUS at 14:03

## 2022-01-01 RX ADMIN — FAMOTIDINE 20 MG: 20 TABLET ORAL at 18:02

## 2022-01-01 RX ADMIN — SODIUM CHLORIDE 750 MG: 900 INJECTION, SOLUTION INTRAVENOUS at 09:41

## 2022-01-01 RX ADMIN — CHLORHEXIDINE GLUCONATE 15 ML: 1.2 RINSE ORAL at 20:22

## 2022-01-01 RX ADMIN — VALPROIC ACID 125 MG: 250 SOLUTION ORAL at 13:53

## 2022-01-01 RX ADMIN — VALPROIC ACID 125 MG: 250 SOLUTION ORAL at 18:02

## 2022-01-01 RX ADMIN — GLYCOPYRROLATE 0.4 MG: 0.2 INJECTION INTRAMUSCULAR; INTRAVENOUS at 16:38

## 2022-01-01 RX ADMIN — MEMANTINE HYDROCHLORIDE 10 MG: 10 TABLET, FILM COATED ORAL at 15:35

## 2022-01-01 RX ADMIN — FAMOTIDINE 20 MG: 20 TABLET ORAL at 06:38

## 2022-01-01 RX ADMIN — CEFEPIME 2 G: 2 INJECTION, POWDER, FOR SOLUTION INTRAVENOUS at 03:44

## 2022-01-01 RX ADMIN — HYDROCODONE BITARTRATE AND ACETAMINOPHEN 1 TABLET: 10; 325 TABLET ORAL at 05:15

## 2022-01-01 RX ADMIN — ENOXAPARIN SODIUM 90 MG: 100 INJECTION SUBCUTANEOUS at 14:15

## 2022-01-01 RX ADMIN — ACETAMINOPHEN 650 MG: 325 TABLET, FILM COATED ORAL at 21:52

## 2022-01-01 RX ADMIN — HYDROMORPHONE HYDROCHLORIDE 2 MG: 2 INJECTION, SOLUTION INTRAMUSCULAR; INTRAVENOUS; SUBCUTANEOUS at 09:03

## 2022-01-01 RX ADMIN — PERFLUTREN 2 ML: 6.52 INJECTION, SUSPENSION INTRAVENOUS at 11:23

## 2022-01-01 RX ADMIN — LORAZEPAM 0.5 MG: 0.5 TABLET ORAL at 12:05

## 2022-01-01 RX ADMIN — VALPROIC ACID 125 MG: 250 SOLUTION ORAL at 14:17

## 2022-01-01 RX ADMIN — GLYCOPYRROLATE 0.2 MG: 0.2 INJECTION INTRAMUSCULAR; INTRAVENOUS at 05:56

## 2022-01-01 RX ADMIN — MORPHINE SULFATE 4 MG: 4 INJECTION, SOLUTION INTRAMUSCULAR; INTRAVENOUS at 19:55

## 2022-01-01 RX ADMIN — LORAZEPAM 0.5 MG: 0.5 TABLET ORAL at 08:25

## 2022-01-01 RX ADMIN — ALBUTEROL SULFATE 2.5 MG: 2.5 SOLUTION RESPIRATORY (INHALATION) at 07:34

## 2022-01-01 RX ADMIN — DEXTROSE MONOHYDRATE 50 ML: 25 INJECTION, SOLUTION INTRAVENOUS at 19:09

## 2022-01-01 RX ADMIN — MEMANTINE HYDROCHLORIDE 10 MG: 10 TABLET, FILM COATED ORAL at 08:31

## 2022-01-01 RX ADMIN — HYDROMORPHONE HYDROCHLORIDE 2 MG: 2 INJECTION, SOLUTION INTRAMUSCULAR; INTRAVENOUS; SUBCUTANEOUS at 04:20

## 2022-01-01 RX ADMIN — ACETAMINOPHEN 650 MG: 325 TABLET, FILM COATED ORAL at 21:26

## 2022-01-01 RX ADMIN — FAMOTIDINE 20 MG: 10 INJECTION INTRAVENOUS at 08:47

## 2022-01-01 RX ADMIN — MORPHINE SULFATE 4 MG: 4 INJECTION, SOLUTION INTRAMUSCULAR; INTRAVENOUS at 20:42

## 2022-01-01 RX ADMIN — POLYETHYLENE GLYCOL 3350 17 G: 17 POWDER, FOR SOLUTION ORAL at 08:47

## 2022-01-01 RX ADMIN — LORAZEPAM 2 MG: 2 INJECTION INTRAMUSCULAR; INTRAVENOUS at 04:20

## 2022-01-01 RX ADMIN — POLYETHYLENE GLYCOL 3350 17 G: 17 POWDER, FOR SOLUTION ORAL at 09:02

## 2022-01-01 RX ADMIN — Medication 10 ML: at 04:28

## 2022-01-01 RX ADMIN — GLYCOPYRROLATE 0.8 MG: 0.2 INJECTION INTRAMUSCULAR; INTRAVENOUS at 12:49

## 2022-01-01 RX ADMIN — HALOPERIDOL 1 MG: 1 TABLET ORAL at 21:47

## 2022-01-01 RX ADMIN — HEPARIN SODIUM 16 UNITS/KG/HR: 10000 INJECTION, SOLUTION INTRAVENOUS at 20:04

## 2022-01-01 RX ADMIN — LORAZEPAM 2 MG: 2 INJECTION INTRAMUSCULAR; INTRAVENOUS at 12:49

## 2022-01-01 RX ADMIN — VALPROIC ACID 125 MG: 250 SOLUTION ORAL at 14:40

## 2022-01-01 RX ADMIN — PRIMIDONE 50 MG: 50 TABLET ORAL at 08:55

## 2022-01-01 RX ADMIN — ALBUTEROL SULFATE 2.5 MG: 2.5 SOLUTION RESPIRATORY (INHALATION) at 19:11

## 2022-01-01 RX ADMIN — HALOPERIDOL 1 MG: 1 TABLET ORAL at 21:26

## 2022-01-01 RX ADMIN — SODIUM CHLORIDE 150 ML/HR: 9 INJECTION, SOLUTION INTRAVENOUS at 15:40

## 2022-01-01 RX ADMIN — FAMOTIDINE 20 MG: 20 TABLET ORAL at 17:23

## 2022-01-01 RX ADMIN — VALPROIC ACID 125 MG: 250 SOLUTION ORAL at 15:13

## 2022-01-01 RX ADMIN — CHLORHEXIDINE GLUCONATE 15 ML: 1.2 RINSE ORAL at 08:13

## 2022-01-01 RX ADMIN — HALOPERIDOL LACTATE 5 MG: 5 INJECTION, SOLUTION INTRAMUSCULAR at 00:39

## 2022-01-01 RX ADMIN — HYDROMORPHONE HYDROCHLORIDE 1.5 MG: 2 INJECTION, SOLUTION INTRAMUSCULAR; INTRAVENOUS; SUBCUTANEOUS at 21:05

## 2022-01-01 RX ADMIN — FENTANYL CITRATE 50 MCG: 50 INJECTION, SOLUTION INTRAMUSCULAR; INTRAVENOUS at 03:18

## 2022-01-01 RX ADMIN — MORPHINE SULFATE 4 MG: 4 INJECTION, SOLUTION INTRAMUSCULAR; INTRAVENOUS at 00:17

## 2022-01-01 RX ADMIN — GLYCOPYRROLATE 0.8 MG: 0.2 INJECTION INTRAMUSCULAR; INTRAVENOUS at 08:41

## 2022-01-01 RX ADMIN — SCOPALAMINE 1 PATCH: 1 PATCH, EXTENDED RELEASE TRANSDERMAL at 13:54

## 2022-01-01 RX ADMIN — GLYCOPYRROLATE 0.4 MG: 0.2 INJECTION INTRAMUSCULAR; INTRAVENOUS at 00:50

## 2022-01-01 RX ADMIN — LORAZEPAM 0.5 MG: 0.5 TABLET ORAL at 08:31

## 2022-01-01 RX ADMIN — HYDROMORPHONE HYDROCHLORIDE 1 MG: 1 INJECTION, SOLUTION INTRAMUSCULAR; INTRAVENOUS; SUBCUTANEOUS at 09:36

## 2022-01-01 RX ADMIN — FENTANYL CITRATE 50 MCG: 50 INJECTION, SOLUTION INTRAMUSCULAR; INTRAVENOUS at 15:41

## 2022-01-01 RX ADMIN — CEFEPIME 2 G: 2 INJECTION, POWDER, FOR SOLUTION INTRAVENOUS at 03:25

## 2022-01-01 RX ADMIN — ALBUTEROL SULFATE 2.5 MG: 2.5 SOLUTION RESPIRATORY (INHALATION) at 11:22

## 2022-01-01 RX ADMIN — HALOPERIDOL 1 MG: 1 TABLET ORAL at 21:06

## 2022-01-01 RX ADMIN — LORAZEPAM 1 MG: 2 INJECTION INTRAMUSCULAR; INTRAVENOUS at 20:21

## 2022-01-01 RX ADMIN — ALBUTEROL SULFATE 2.5 MG: 2.5 SOLUTION RESPIRATORY (INHALATION) at 13:29

## 2022-01-01 RX ADMIN — LORAZEPAM 1 MG: 2 INJECTION INTRAMUSCULAR; INTRAVENOUS at 04:27

## 2022-01-01 RX ADMIN — ALBUTEROL SULFATE 2.5 MG: 2.5 SOLUTION RESPIRATORY (INHALATION) at 07:08

## 2022-01-01 RX ADMIN — HYDROMORPHONE HYDROCHLORIDE 1.5 MG: 2 INJECTION, SOLUTION INTRAMUSCULAR; INTRAVENOUS; SUBCUTANEOUS at 14:15

## 2022-01-01 RX ADMIN — POLYETHYLENE GLYCOL 3350 17 G: 17 POWDER, FOR SOLUTION ORAL at 08:55

## 2022-01-01 RX ADMIN — HYDROCODONE BITARTRATE AND ACETAMINOPHEN 1 TABLET: 10; 325 TABLET ORAL at 20:30

## 2022-01-01 RX ADMIN — HYDROMORPHONE HYDROCHLORIDE 1.5 MG: 2 INJECTION, SOLUTION INTRAMUSCULAR; INTRAVENOUS; SUBCUTANEOUS at 12:27

## 2022-01-01 RX ADMIN — LORAZEPAM 2 MG: 2 INJECTION INTRAMUSCULAR; INTRAVENOUS at 14:15

## 2022-01-01 RX ADMIN — HALOPERIDOL 1 MG: 1 TABLET ORAL at 21:49

## 2022-01-01 RX ADMIN — LORAZEPAM 1 MG: 2 INJECTION INTRAMUSCULAR; INTRAVENOUS at 20:42

## 2022-01-01 RX ADMIN — HYDROMORPHONE HYDROCHLORIDE 1 MG: 1 INJECTION, SOLUTION INTRAMUSCULAR; INTRAVENOUS; SUBCUTANEOUS at 04:23

## 2022-01-01 RX ADMIN — HALOPERIDOL 1 MG: 1 TABLET ORAL at 14:09

## 2022-01-01 RX ADMIN — VALPROIC ACID 125 MG: 250 SOLUTION ORAL at 14:34

## 2022-01-01 RX ADMIN — HYDROCODONE BITARTRATE AND ACETAMINOPHEN 1 TABLET: 10; 325 TABLET ORAL at 08:55

## 2022-01-01 RX ADMIN — HYDROCODONE BITARTRATE AND ACETAMINOPHEN 1 TABLET: 10; 325 TABLET ORAL at 05:09

## 2022-01-01 RX ADMIN — CEFEPIME 2 G: 2 INJECTION, POWDER, FOR SOLUTION INTRAVENOUS at 20:43

## 2022-01-01 RX ADMIN — POLYETHYLENE GLYCOL 3350 17 G: 17 POWDER, FOR SOLUTION ORAL at 08:10

## 2022-01-01 RX ADMIN — ALBUTEROL SULFATE 2.5 MG: 2.5 SOLUTION RESPIRATORY (INHALATION) at 15:16

## 2022-01-01 RX ADMIN — CEFEPIME 2 G: 2 INJECTION, POWDER, FOR SOLUTION INTRAVENOUS at 21:37

## 2022-01-01 RX ADMIN — PHENYLEPHRINE HYDROCHLORIDE 0.5 MCG/KG/MIN: 50 INJECTION INTRAVENOUS at 22:27

## 2022-01-01 RX ADMIN — VALPROIC ACID 125 MG: 250 SOLUTION ORAL at 00:13

## 2022-01-01 RX ADMIN — GLYCOPYRROLATE 0.8 MG: 0.2 INJECTION INTRAMUSCULAR; INTRAVENOUS at 04:54

## 2022-01-01 RX ADMIN — HYDROMORPHONE HYDROCHLORIDE 1.5 MG: 2 INJECTION, SOLUTION INTRAMUSCULAR; INTRAVENOUS; SUBCUTANEOUS at 00:50

## 2022-01-01 RX ADMIN — ALBUTEROL SULFATE 2.5 MG: 2.5 SOLUTION RESPIRATORY (INHALATION) at 20:44

## 2022-01-01 RX ADMIN — ALBUTEROL SULFATE 2.5 MG: 2.5 SOLUTION RESPIRATORY (INHALATION) at 16:44

## 2022-01-01 RX ADMIN — ENOXAPARIN SODIUM 90 MG: 100 INJECTION SUBCUTANEOUS at 14:38

## 2022-01-01 RX ADMIN — ALBUTEROL SULFATE 2.5 MG: 2.5 SOLUTION RESPIRATORY (INHALATION) at 12:12

## 2022-01-01 RX ADMIN — HALOPERIDOL 1 MG: 1 TABLET ORAL at 06:33

## 2022-01-01 RX ADMIN — POTASSIUM CHLORIDE 40 MEQ: 1.5 POWDER, FOR SOLUTION ORAL at 05:46

## 2022-01-01 RX ADMIN — HYDROMORPHONE HYDROCHLORIDE 2 MG: 2 INJECTION, SOLUTION INTRAMUSCULAR; INTRAVENOUS; SUBCUTANEOUS at 08:41

## 2022-01-01 RX ADMIN — FAMOTIDINE 20 MG: 20 TABLET ORAL at 17:28

## 2022-01-01 RX ADMIN — ENOXAPARIN SODIUM 90 MG: 100 INJECTION SUBCUTANEOUS at 02:51

## 2022-01-01 RX ADMIN — VALPROIC ACID 125 MG: 250 SOLUTION ORAL at 08:54

## 2022-01-01 RX ADMIN — ENOXAPARIN SODIUM 90 MG: 100 INJECTION SUBCUTANEOUS at 15:00

## 2022-01-01 RX ADMIN — ALBUTEROL SULFATE 2.5 MG: 2.5 SOLUTION RESPIRATORY (INHALATION) at 10:13

## 2022-01-01 RX ADMIN — ENOXAPARIN SODIUM 90 MG: 100 INJECTION SUBCUTANEOUS at 15:35

## 2022-01-01 RX ADMIN — VALPROIC ACID 125 MG: 250 SOLUTION ORAL at 06:34

## 2022-01-01 RX ADMIN — VALPROIC ACID 125 MG: 250 SOLUTION ORAL at 13:17

## 2022-01-01 RX ADMIN — ALBUTEROL SULFATE 2.5 MG: 2.5 SOLUTION RESPIRATORY (INHALATION) at 09:27

## 2022-01-01 RX ADMIN — CEFEPIME 2 G: 2 INJECTION, POWDER, FOR SOLUTION INTRAVENOUS at 12:53

## 2022-01-01 RX ADMIN — HEPARIN SODIUM 16 UNITS/KG/HR: 10000 INJECTION, SOLUTION INTRAVENOUS at 00:11

## 2022-01-01 RX ADMIN — CHLORHEXIDINE GLUCONATE 15 ML: 1.2 RINSE ORAL at 21:38

## 2022-01-01 RX ADMIN — Medication 2 PACKET: at 05:46

## 2022-01-01 RX ADMIN — MORPHINE SULFATE 4 MG: 4 INJECTION, SOLUTION INTRAMUSCULAR; INTRAVENOUS at 00:22

## 2022-01-01 RX ADMIN — VALPROIC ACID 125 MG: 250 SOLUTION ORAL at 14:39

## 2022-01-01 RX ADMIN — HEPARIN SODIUM 12 UNITS/KG/HR: 10000 INJECTION, SOLUTION INTRAVENOUS at 12:46

## 2022-01-01 RX ADMIN — LORAZEPAM 1 MG: 2 INJECTION INTRAMUSCULAR; INTRAVENOUS at 14:03

## 2022-01-01 RX ADMIN — SODIUM CHLORIDE 75 ML/HR: 9 INJECTION, SOLUTION INTRAVENOUS at 12:45

## 2022-01-01 RX ADMIN — LORAZEPAM 0.5 MG: 0.5 TABLET ORAL at 09:02

## 2022-01-01 RX ADMIN — HYDROMORPHONE HYDROCHLORIDE 2 MG: 2 INJECTION, SOLUTION INTRAMUSCULAR; INTRAVENOUS; SUBCUTANEOUS at 16:13

## 2022-01-01 RX ADMIN — GLYCOPYRROLATE 0.8 MG: 0.2 INJECTION INTRAMUSCULAR; INTRAVENOUS at 20:41

## 2022-01-01 RX ADMIN — FAMOTIDINE 20 MG: 20 TABLET ORAL at 10:46

## 2022-01-01 RX ADMIN — VALPROIC ACID 125 MG: 250 SOLUTION ORAL at 21:49

## 2022-01-01 RX ADMIN — LORAZEPAM 0.5 MG: 0.5 TABLET ORAL at 21:49

## 2022-01-01 RX ADMIN — Medication 10 ML: at 08:42

## 2022-01-01 RX ADMIN — CEFEPIME 2 G: 2 INJECTION, POWDER, FOR SOLUTION INTRAVENOUS at 12:00

## 2022-01-01 RX ADMIN — DEXMEDETOMIDINE HYDROCHLORIDE 0.1 MCG/KG/HR: 4 INJECTION, SOLUTION INTRAVENOUS at 00:10

## 2022-01-01 RX ADMIN — CEFEPIME 2 G: 2 INJECTION, POWDER, FOR SOLUTION INTRAVENOUS at 04:17

## 2022-01-01 RX ADMIN — VALPROIC ACID 125 MG: 250 SOLUTION ORAL at 06:25

## 2022-01-01 RX ADMIN — CEFEPIME 2 G: 2 INJECTION, POWDER, FOR SOLUTION INTRAVENOUS at 05:34

## 2022-01-01 RX ADMIN — HALOPERIDOL 1 MG: 1 TABLET ORAL at 06:06

## 2022-01-01 RX ADMIN — HYDROMORPHONE HYDROCHLORIDE 1.5 MG: 2 INJECTION, SOLUTION INTRAMUSCULAR; INTRAVENOUS; SUBCUTANEOUS at 08:59

## 2022-01-01 RX ADMIN — PRIMIDONE 50 MG: 50 TABLET ORAL at 08:25

## 2022-01-01 RX ADMIN — PROPOFOL INJECTABLE EMULSION 5 MCG/KG/MIN: 10 INJECTION, EMULSION INTRAVENOUS at 10:52

## 2022-01-01 RX ADMIN — CEFEPIME 2 G: 2 INJECTION, POWDER, FOR SOLUTION INTRAVENOUS at 03:18

## 2022-01-01 RX ADMIN — HYDROMORPHONE HYDROCHLORIDE 1.5 MG: 2 INJECTION, SOLUTION INTRAMUSCULAR; INTRAVENOUS; SUBCUTANEOUS at 16:39

## 2022-01-01 RX ADMIN — Medication 2 PACKET: at 00:10

## 2022-01-01 RX ADMIN — HYDROMORPHONE HYDROCHLORIDE 2 MG: 2 INJECTION, SOLUTION INTRAMUSCULAR; INTRAVENOUS; SUBCUTANEOUS at 20:42

## 2022-01-01 RX ADMIN — FENTANYL CITRATE 50 MCG: 50 INJECTION, SOLUTION INTRAMUSCULAR; INTRAVENOUS at 19:26

## 2022-01-01 RX ADMIN — LORAZEPAM 0.5 MG: 0.5 TABLET ORAL at 21:37

## 2022-01-01 RX ADMIN — MEMANTINE HYDROCHLORIDE 10 MG: 10 TABLET, FILM COATED ORAL at 21:26

## 2022-01-01 RX ADMIN — MORPHINE SULFATE 4 MG: 4 INJECTION, SOLUTION INTRAMUSCULAR; INTRAVENOUS at 04:28

## 2022-01-01 RX ADMIN — Medication 2 PACKET: at 21:37

## 2022-01-01 RX ADMIN — GLYCOPYRROLATE 0.4 MG: 0.2 INJECTION INTRAMUSCULAR; INTRAVENOUS at 09:36

## 2022-01-01 RX ADMIN — POLYETHYLENE GLYCOL 3350 17 G: 17 POWDER, FOR SOLUTION ORAL at 08:35

## 2022-01-01 RX ADMIN — FENTANYL CITRATE 50 MCG: 50 INJECTION, SOLUTION INTRAMUSCULAR; INTRAVENOUS at 12:44

## 2022-01-01 RX ADMIN — LORAZEPAM 1 MG: 2 INJECTION INTRAMUSCULAR; INTRAVENOUS at 18:02

## 2022-01-01 RX ADMIN — FAMOTIDINE 20 MG: 20 TABLET ORAL at 17:43

## 2022-01-01 RX ADMIN — PRIMIDONE 50 MG: 50 TABLET ORAL at 08:47

## 2022-01-01 RX ADMIN — TRAMADOL HYDROCHLORIDE 50 MG: 50 TABLET, COATED ORAL at 08:49

## 2022-01-01 RX ADMIN — MEMANTINE HYDROCHLORIDE 10 MG: 10 TABLET, FILM COATED ORAL at 21:05

## 2022-01-01 RX ADMIN — LORAZEPAM 0.5 MG: 0.5 TABLET ORAL at 08:35

## 2022-01-01 RX ADMIN — CEFEPIME 2 G: 2 INJECTION, POWDER, FOR SOLUTION INTRAVENOUS at 12:05

## 2022-01-01 RX ADMIN — LORAZEPAM 2 MG: 2 INJECTION INTRAMUSCULAR; INTRAVENOUS at 09:04

## 2022-01-01 RX ADMIN — FENTANYL CITRATE 50 MCG: 50 INJECTION, SOLUTION INTRAMUSCULAR; INTRAVENOUS at 10:58

## 2022-01-01 RX ADMIN — VALPROIC ACID 125 MG: 250 SOLUTION ORAL at 06:06

## 2022-01-01 RX ADMIN — POLYETHYLENE GLYCOL 3350 17 G: 17 POWDER, FOR SOLUTION ORAL at 08:25

## 2022-01-01 RX ADMIN — HALOPERIDOL 1 MG: 1 TABLET ORAL at 21:02

## 2022-01-01 RX ADMIN — FAMOTIDINE 20 MG: 20 TABLET ORAL at 06:31

## 2022-01-01 RX ADMIN — POLYETHYLENE GLYCOL 3350 17 G: 17 POWDER, FOR SOLUTION ORAL at 10:41

## 2022-01-01 RX ADMIN — VALPROIC ACID 125 MG: 250 SOLUTION ORAL at 21:11

## 2022-01-01 RX ADMIN — MORPHINE SULFATE 4 MG: 4 INJECTION, SOLUTION INTRAMUSCULAR; INTRAVENOUS at 18:02

## 2022-01-01 RX ADMIN — ACETAMINOPHEN 650 MG: 325 TABLET, FILM COATED ORAL at 06:25

## 2022-01-01 RX ADMIN — LORAZEPAM 2 MG: 2 INJECTION INTRAMUSCULAR; INTRAVENOUS at 19:55

## 2022-01-01 RX ADMIN — ENOXAPARIN SODIUM 90 MG: 100 INJECTION SUBCUTANEOUS at 01:04

## 2022-01-01 RX ADMIN — GLYCOPYRROLATE 0.4 MG: 0.2 INJECTION INTRAMUSCULAR; INTRAVENOUS at 08:59

## 2022-01-01 RX ADMIN — VALPROIC ACID 125 MG: 250 SOLUTION ORAL at 06:11

## 2022-01-01 RX ADMIN — LORAZEPAM 1 MG: 1 TABLET ORAL at 08:57

## 2022-01-01 RX ADMIN — HALOPERIDOL 1 MG: 1 TABLET ORAL at 16:21

## 2022-01-01 RX ADMIN — PRIMIDONE 50 MG: 50 TABLET ORAL at 08:57

## 2022-01-01 RX ADMIN — DIGOXIN 500 MCG: 0.25 INJECTION INTRAMUSCULAR; INTRAVENOUS at 19:04

## 2022-01-01 RX ADMIN — LORAZEPAM 1 MG: 2 INJECTION INTRAMUSCULAR; INTRAVENOUS at 17:04

## 2022-01-01 RX ADMIN — LORAZEPAM 3 MG: 2 INJECTION INTRAMUSCULAR; INTRAVENOUS at 16:13

## 2022-01-01 RX ADMIN — ACETAMINOPHEN 650 MG: 325 TABLET, FILM COATED ORAL at 22:03

## 2022-01-01 RX ADMIN — VALPROIC ACID 125 MG: 250 SOLUTION ORAL at 21:26

## 2022-01-01 RX ADMIN — GLYCOPYRROLATE 0.2 MG: 0.2 INJECTION INTRAMUSCULAR; INTRAVENOUS at 13:37

## 2022-01-01 RX ADMIN — HALOPERIDOL 1 MG: 1 TABLET ORAL at 06:25

## 2022-01-01 RX ADMIN — LORAZEPAM 0.5 MG: 0.5 TABLET ORAL at 08:10

## 2022-01-01 RX ADMIN — POLYETHYLENE GLYCOL 3350 17 G: 17 POWDER, FOR SOLUTION ORAL at 10:06

## 2022-01-01 RX ADMIN — ENOXAPARIN SODIUM 90 MG: 100 INJECTION SUBCUTANEOUS at 01:11

## 2022-01-01 RX ADMIN — FAMOTIDINE 20 MG: 10 INJECTION INTRAVENOUS at 08:19

## 2022-01-01 RX ADMIN — DEXMEDETOMIDINE HYDROCHLORIDE 0.2 MCG/KG/HR: 4 INJECTION, SOLUTION INTRAVENOUS at 23:17

## 2022-01-01 RX ADMIN — ACETAMINOPHEN 650 MG: 325 TABLET, FILM COATED ORAL at 03:16

## 2022-01-01 RX ADMIN — HYDROMORPHONE HYDROCHLORIDE 1.5 MG: 2 INJECTION, SOLUTION INTRAMUSCULAR; INTRAVENOUS; SUBCUTANEOUS at 04:31

## 2022-01-01 RX ADMIN — HYDROMORPHONE HYDROCHLORIDE 1.5 MG: 2 INJECTION, SOLUTION INTRAMUSCULAR; INTRAVENOUS; SUBCUTANEOUS at 16:55

## 2022-01-01 RX ADMIN — VALPROIC ACID 125 MG: 250 SOLUTION ORAL at 14:20

## 2022-01-01 RX ADMIN — VALPROIC ACID 125 MG: 250 SOLUTION ORAL at 21:10

## 2022-01-01 RX ADMIN — SODIUM CHLORIDE 500 ML: 9 INJECTION, SOLUTION INTRAVENOUS at 02:11

## 2022-01-01 RX ADMIN — CITALOPRAM 10 MG: 10 TABLET, FILM COATED ORAL at 21:26

## 2022-01-01 RX ADMIN — MORPHINE SULFATE 4 MG: 4 INJECTION, SOLUTION INTRAMUSCULAR; INTRAVENOUS at 20:21

## 2022-01-01 RX ADMIN — Medication 0.02 MCG/KG/MIN: at 21:57

## 2022-01-01 RX ADMIN — CEFEPIME 2 G: 2 INJECTION, POWDER, FOR SOLUTION INTRAVENOUS at 20:21

## 2022-01-01 RX ADMIN — ALBUTEROL SULFATE 2.5 MG: 2.5 SOLUTION RESPIRATORY (INHALATION) at 19:09

## 2022-01-01 RX ADMIN — FAMOTIDINE 20 MG: 20 TABLET ORAL at 16:40

## 2022-01-01 RX ADMIN — ACETAMINOPHEN 650 MG: 325 TABLET, FILM COATED ORAL at 12:53

## 2022-01-01 RX ADMIN — TRAMADOL HYDROCHLORIDE 50 MG: 50 TABLET, COATED ORAL at 21:26

## 2022-01-01 RX ADMIN — GLYCOPYRROLATE 0.4 MG: 0.2 INJECTION INTRAMUSCULAR; INTRAVENOUS at 09:04

## 2022-01-01 RX ADMIN — LORAZEPAM 0.5 MG: 0.5 TABLET ORAL at 08:49

## 2022-01-01 RX ADMIN — ALBUTEROL SULFATE 2.5 MG: 2.5 SOLUTION RESPIRATORY (INHALATION) at 19:32

## 2022-01-01 RX ADMIN — CEFEPIME 2 G: 2 INJECTION, POWDER, FOR SOLUTION INTRAVENOUS at 21:48

## 2022-01-01 RX ADMIN — VALPROIC ACID 125 MG: 250 SOLUTION ORAL at 22:35

## 2022-01-01 RX ADMIN — TRAMADOL HYDROCHLORIDE 50 MG: 50 TABLET, COATED ORAL at 08:31

## 2022-01-01 RX ADMIN — LORAZEPAM 0.5 MG: 2 INJECTION INTRAMUSCULAR; INTRAVENOUS at 01:11

## 2022-01-01 RX ADMIN — POLYETHYLENE GLYCOL 3350 17 G: 17 POWDER, FOR SOLUTION ORAL at 10:45

## 2022-01-01 RX ADMIN — GLYCOPYRROLATE 0.2 MG: 0.2 INJECTION INTRAMUSCULAR; INTRAVENOUS at 18:02

## 2022-01-01 RX ADMIN — Medication 2 PACKET: at 16:40

## 2022-01-01 RX ADMIN — CEFEPIME 2 G: 2 INJECTION, POWDER, FOR SOLUTION INTRAVENOUS at 20:13

## 2022-01-01 RX ADMIN — ALBUTEROL SULFATE 2.5 MG: 2.5 SOLUTION RESPIRATORY (INHALATION) at 15:31

## 2022-01-01 RX ADMIN — MEMANTINE HYDROCHLORIDE 10 MG: 10 TABLET, FILM COATED ORAL at 21:03

## 2022-01-01 RX ADMIN — TRAMADOL HYDROCHLORIDE 50 MG: 50 TABLET, COATED ORAL at 22:33

## 2022-01-01 RX ADMIN — ALBUTEROL SULFATE 2.5 MG: 2.5 SOLUTION RESPIRATORY (INHALATION) at 07:48

## 2022-01-01 RX ADMIN — ENOXAPARIN SODIUM 90 MG: 100 INJECTION SUBCUTANEOUS at 16:21

## 2022-01-01 RX ADMIN — CITALOPRAM 10 MG: 10 TABLET, FILM COATED ORAL at 21:47

## 2022-01-01 RX ADMIN — VALPROIC ACID 125 MG: 250 SOLUTION ORAL at 06:59

## 2022-01-01 RX ADMIN — ALBUTEROL SULFATE 2.5 MG: 2.5 SOLUTION RESPIRATORY (INHALATION) at 07:13

## 2022-01-01 RX ADMIN — MEMANTINE HYDROCHLORIDE 10 MG: 10 TABLET, FILM COATED ORAL at 09:02

## 2022-01-01 RX ADMIN — ALBUTEROL SULFATE 2.5 MG: 2.5 SOLUTION RESPIRATORY (INHALATION) at 13:26

## 2022-01-01 RX ADMIN — GLYCOPYRROLATE 0.4 MG: 0.2 INJECTION INTRAMUSCULAR; INTRAVENOUS at 20:29

## 2022-01-01 RX ADMIN — VALPROIC ACID 125 MG: 250 SOLUTION ORAL at 05:09

## 2022-01-01 RX ADMIN — LORAZEPAM 2 MG: 2 INJECTION INTRAMUSCULAR; INTRAVENOUS at 04:31

## 2022-01-01 RX ADMIN — SODIUM CHLORIDE 1000 ML: 9 INJECTION, SOLUTION INTRAVENOUS at 10:39

## 2022-01-01 RX ADMIN — LORAZEPAM 2 MG: 2 INJECTION INTRAMUSCULAR; INTRAVENOUS at 00:18

## 2022-01-01 RX ADMIN — MEMANTINE HYDROCHLORIDE 10 MG: 10 TABLET, FILM COATED ORAL at 21:47

## 2022-01-01 RX ADMIN — ALBUTEROL SULFATE 2.5 MG: 2.5 SOLUTION RESPIRATORY (INHALATION) at 10:39

## 2022-01-01 RX ADMIN — LORAZEPAM 2 MG: 2 INJECTION INTRAMUSCULAR; INTRAVENOUS at 04:23

## 2022-01-01 RX ADMIN — POTASSIUM CHLORIDE 40 MEQ: 1.5 POWDER, FOR SOLUTION ORAL at 14:20

## 2022-01-01 RX ADMIN — FAMOTIDINE 20 MG: 20 TABLET ORAL at 18:09

## 2022-01-01 RX ADMIN — ALBUTEROL SULFATE 2.5 MG: 2.5 SOLUTION RESPIRATORY (INHALATION) at 07:03

## 2022-01-01 RX ADMIN — LORAZEPAM 2 MG: 2 INJECTION INTRAMUSCULAR; INTRAVENOUS at 13:09

## 2022-01-01 RX ADMIN — ALBUTEROL SULFATE 2.5 MG: 2.5 SOLUTION RESPIRATORY (INHALATION) at 08:17

## 2022-01-01 RX ADMIN — ENOXAPARIN SODIUM 90 MG: 100 INJECTION SUBCUTANEOUS at 03:15

## 2022-01-01 RX ADMIN — POTASSIUM PHOSPHATE, MONOBASIC AND POTASSIUM PHOSPHATE, DIBASIC 30 MMOL: 224; 236 INJECTION, SOLUTION, CONCENTRATE INTRAVENOUS at 11:09

## 2022-01-01 RX ADMIN — CITALOPRAM 10 MG: 10 TABLET, FILM COATED ORAL at 22:33

## 2022-01-01 RX ADMIN — ALBUTEROL SULFATE 2.5 MG: 2.5 SOLUTION RESPIRATORY (INHALATION) at 07:23

## 2022-01-01 RX ADMIN — LORAZEPAM 1 MG: 2 INJECTION INTRAMUSCULAR; INTRAVENOUS at 09:49

## 2022-01-01 RX ADMIN — MORPHINE SULFATE 4 MG: 4 INJECTION, SOLUTION INTRAMUSCULAR; INTRAVENOUS at 09:52

## 2022-01-01 RX ADMIN — FAMOTIDINE 20 MG: 20 TABLET ORAL at 08:10

## 2022-01-01 RX ADMIN — ACETAMINOPHEN 650 MG: 325 TABLET, FILM COATED ORAL at 11:54

## 2022-01-01 RX ADMIN — GLYCOPYRROLATE 0.8 MG: 0.2 INJECTION INTRAMUSCULAR; INTRAVENOUS at 00:34

## 2022-01-01 RX ADMIN — HYDROCODONE BITARTRATE AND ACETAMINOPHEN 1 TABLET: 10; 325 TABLET ORAL at 12:40

## 2022-01-01 RX ADMIN — CEFEPIME 2 G: 2 INJECTION, POWDER, FOR SOLUTION INTRAVENOUS at 12:25

## 2022-01-01 RX ADMIN — LORAZEPAM 1 MG: 2 INJECTION INTRAMUSCULAR; INTRAVENOUS at 16:47

## 2022-01-01 RX ADMIN — MORPHINE SULFATE 4 MG: 4 INJECTION, SOLUTION INTRAMUSCULAR; INTRAVENOUS at 09:48

## 2022-01-01 RX ADMIN — SODIUM CHLORIDE 750 MG: 900 INJECTION, SOLUTION INTRAVENOUS at 10:45

## 2022-01-01 RX ADMIN — ENOXAPARIN SODIUM 90 MG: 100 INJECTION SUBCUTANEOUS at 14:09

## 2022-01-01 RX ADMIN — GLYCOPYRROLATE 0.4 MG: 0.2 INJECTION INTRAMUSCULAR; INTRAVENOUS at 04:23

## 2022-01-01 RX ADMIN — GLYCOPYRROLATE 0.2 MG: 0.2 INJECTION INTRAMUSCULAR; INTRAVENOUS at 20:21

## 2022-01-01 RX ADMIN — VALPROIC ACID 125 MG: 250 SOLUTION ORAL at 21:47

## 2022-01-01 RX ADMIN — MEMANTINE HYDROCHLORIDE 10 MG: 10 TABLET, FILM COATED ORAL at 08:57

## 2022-01-01 RX ADMIN — FENTANYL CITRATE 50 MCG: 50 INJECTION, SOLUTION INTRAMUSCULAR; INTRAVENOUS at 03:10

## 2022-01-01 RX ADMIN — FAMOTIDINE 20 MG: 10 INJECTION INTRAVENOUS at 21:13

## 2022-01-01 RX ADMIN — LORAZEPAM 2 MG: 2 INJECTION INTRAMUSCULAR; INTRAVENOUS at 00:33

## 2022-01-01 RX ADMIN — PRIMIDONE 50 MG: 50 TABLET ORAL at 13:53

## 2022-01-01 RX ADMIN — GLYCOPYRROLATE 0.2 MG: 0.2 INJECTION INTRAMUSCULAR; INTRAVENOUS at 16:47

## 2022-01-01 RX ADMIN — SODIUM CHLORIDE 75 ML/HR: 9 INJECTION, SOLUTION INTRAVENOUS at 02:13

## 2022-01-01 RX ADMIN — HALOPERIDOL 1 MG: 1 TABLET ORAL at 06:34

## 2022-01-01 RX ADMIN — CITALOPRAM 10 MG: 10 TABLET, FILM COATED ORAL at 21:10

## 2022-01-01 RX ADMIN — PRIMIDONE 50 MG: 50 TABLET ORAL at 08:31

## 2022-01-01 RX ADMIN — LORAZEPAM 0.5 MG: 0.5 TABLET ORAL at 22:33

## 2022-01-01 RX ADMIN — ALBUTEROL SULFATE 2.5 MG: 2.5 SOLUTION RESPIRATORY (INHALATION) at 09:06

## 2022-01-01 RX ADMIN — LORAZEPAM 0.5 MG: 0.5 TABLET ORAL at 21:47

## 2022-01-01 RX ADMIN — LORAZEPAM 0.5 MG: 0.5 TABLET ORAL at 21:26

## 2022-01-01 RX ADMIN — HEPARIN SODIUM 6500 UNITS: 5000 INJECTION INTRAVENOUS; SUBCUTANEOUS at 15:10

## 2022-01-01 RX ADMIN — FENTANYL CITRATE 50 MCG: 50 INJECTION, SOLUTION INTRAMUSCULAR; INTRAVENOUS at 09:10

## 2022-01-01 RX ADMIN — ALBUTEROL SULFATE 2.5 MG: 2.5 SOLUTION RESPIRATORY (INHALATION) at 14:00

## 2022-01-01 RX ADMIN — ALBUTEROL SULFATE 2.5 MG: 2.5 SOLUTION RESPIRATORY (INHALATION) at 19:05

## 2022-01-01 RX ADMIN — ACETAMINOPHEN 650 MG: 325 TABLET, FILM COATED ORAL at 03:10

## 2022-01-01 RX ADMIN — MORPHINE SULFATE 4 MG: 4 INJECTION, SOLUTION INTRAMUSCULAR; INTRAVENOUS at 13:37

## 2022-01-01 RX ADMIN — GLYCOPYRROLATE 0.2 MG: 0.2 INJECTION INTRAMUSCULAR; INTRAVENOUS at 10:05

## 2022-01-01 RX ADMIN — FAMOTIDINE 20 MG: 20 TABLET ORAL at 08:48

## 2022-01-01 RX ADMIN — ALBUTEROL SULFATE 2.5 MG: 2.5 SOLUTION RESPIRATORY (INHALATION) at 06:47

## 2022-01-01 RX ADMIN — FENTANYL CITRATE 50 MCG: 50 INJECTION, SOLUTION INTRAMUSCULAR; INTRAVENOUS at 17:24

## 2022-01-01 RX ADMIN — ALBUTEROL SULFATE 2.5 MG: 2.5 SOLUTION RESPIRATORY (INHALATION) at 19:43

## 2022-01-01 RX ADMIN — GLYCOPYRROLATE 0.4 MG: 0.2 INJECTION INTRAMUSCULAR; INTRAVENOUS at 12:27

## 2022-01-01 RX ADMIN — ACETAMINOPHEN 650 MG: 325 TABLET, FILM COATED ORAL at 21:47

## 2022-01-01 RX ADMIN — LORAZEPAM 2 MG: 2 INJECTION INTRAMUSCULAR; INTRAVENOUS at 16:56

## 2022-01-01 RX ADMIN — ALBUTEROL SULFATE 2.5 MG: 2.5 SOLUTION RESPIRATORY (INHALATION) at 10:54

## 2022-01-01 RX ADMIN — SCOPALAMINE 1 PATCH: 1 PATCH, EXTENDED RELEASE TRANSDERMAL at 04:32

## 2022-01-01 RX ADMIN — CEFEPIME 2 G: 2 INJECTION, POWDER, FOR SOLUTION INTRAVENOUS at 21:12

## 2022-01-01 RX ADMIN — FAMOTIDINE 20 MG: 20 TABLET ORAL at 06:30

## 2022-01-01 RX ADMIN — MORPHINE SULFATE 4 MG: 4 INJECTION, SOLUTION INTRAMUSCULAR; INTRAVENOUS at 16:48

## 2022-01-01 RX ADMIN — LORAZEPAM 2 MG: 2 INJECTION INTRAMUSCULAR; INTRAVENOUS at 20:30

## 2022-01-01 RX ADMIN — VALPROIC ACID 125 MG: 250 SOLUTION ORAL at 06:33

## 2022-01-01 RX ADMIN — CEFEPIME 2 G: 2 INJECTION, POWDER, FOR SOLUTION INTRAVENOUS at 12:34

## 2022-01-01 RX ADMIN — MEMANTINE HYDROCHLORIDE 10 MG: 10 TABLET, FILM COATED ORAL at 20:17

## 2022-01-01 RX ADMIN — PRIMIDONE 50 MG: 50 TABLET ORAL at 09:02

## 2022-01-01 RX ADMIN — SODIUM CHLORIDE 1000 ML: 9 INJECTION, SOLUTION INTRAVENOUS at 18:10

## 2022-01-01 RX ADMIN — ENOXAPARIN SODIUM 90 MG: 100 INJECTION SUBCUTANEOUS at 03:05

## 2022-01-01 RX ADMIN — LORAZEPAM 1 MG: 2 INJECTION INTRAMUSCULAR; INTRAVENOUS at 04:48

## 2022-01-01 RX ADMIN — LORAZEPAM 2 MG: 2 INJECTION INTRAMUSCULAR; INTRAVENOUS at 08:59

## 2022-01-01 RX ADMIN — MEMANTINE HYDROCHLORIDE 10 MG: 10 TABLET, FILM COATED ORAL at 12:53

## 2022-01-01 RX ADMIN — TRAMADOL HYDROCHLORIDE 50 MG: 50 TABLET, COATED ORAL at 10:45

## 2022-01-01 RX ADMIN — FAMOTIDINE 20 MG: 20 TABLET ORAL at 18:57

## 2022-01-01 RX ADMIN — HEPARIN SODIUM 18 UNITS/KG/HR: 10000 INJECTION, SOLUTION INTRAVENOUS at 15:13

## 2022-01-01 RX ADMIN — HYDROMORPHONE HYDROCHLORIDE 2 MG: 2 INJECTION, SOLUTION INTRAMUSCULAR; INTRAVENOUS; SUBCUTANEOUS at 11:34

## 2022-01-01 RX ADMIN — GLYCOPYRROLATE 0.2 MG: 0.2 INJECTION INTRAMUSCULAR; INTRAVENOUS at 14:03

## 2022-01-01 RX ADMIN — ALBUTEROL SULFATE 2.5 MG: 2.5 SOLUTION RESPIRATORY (INHALATION) at 19:18

## 2022-01-01 RX ADMIN — LORAZEPAM 0.5 MG: 0.5 TABLET ORAL at 12:34

## 2022-01-01 RX ADMIN — FAMOTIDINE 20 MG: 20 TABLET ORAL at 18:25

## 2022-01-01 RX ADMIN — Medication 0.1 MCG/KG/MIN: at 12:46

## 2022-01-01 RX ADMIN — VALPROIC ACID 125 MG: 250 SOLUTION ORAL at 15:00

## 2022-01-01 RX ADMIN — LORAZEPAM 0.5 MG: 0.5 TABLET ORAL at 21:06

## 2022-01-01 RX ADMIN — CITALOPRAM 10 MG: 10 TABLET, FILM COATED ORAL at 21:49

## 2022-01-01 RX ADMIN — Medication 2 PACKET: at 06:59

## 2022-01-01 RX ADMIN — TRAMADOL HYDROCHLORIDE 50 MG: 50 TABLET, COATED ORAL at 08:35

## 2022-01-01 RX ADMIN — MORPHINE SULFATE 4 MG: 4 INJECTION, SOLUTION INTRAMUSCULAR; INTRAVENOUS at 10:06

## 2022-01-01 RX ADMIN — HEPARIN SODIUM 6500 UNITS: 5000 INJECTION INTRAVENOUS; SUBCUTANEOUS at 06:06

## 2022-01-01 RX ADMIN — CEFEPIME 2 G: 2 INJECTION, POWDER, FOR SOLUTION INTRAVENOUS at 12:45

## 2022-01-01 RX ADMIN — CHLORHEXIDINE GLUCONATE 15 ML: 1.2 RINSE ORAL at 21:24

## 2022-01-01 RX ADMIN — LORAZEPAM 1 MG: 1 TABLET ORAL at 00:12

## 2022-01-01 RX ADMIN — MEMANTINE HYDROCHLORIDE 10 MG: 10 TABLET, FILM COATED ORAL at 08:25

## 2022-01-01 RX ADMIN — TRAMADOL HYDROCHLORIDE 50 MG: 50 TABLET, COATED ORAL at 21:07

## 2022-01-01 RX ADMIN — VALPROIC ACID 125 MG: 250 SOLUTION ORAL at 21:00

## 2022-01-01 RX ADMIN — FENTANYL CITRATE 50 MCG: 50 INJECTION, SOLUTION INTRAMUSCULAR; INTRAVENOUS at 08:26

## 2022-01-01 RX ADMIN — VALPROIC ACID 125 MG: 250 SOLUTION ORAL at 14:09

## 2022-01-01 RX ADMIN — HALOPERIDOL 1 MG: 1 TABLET ORAL at 06:11

## 2022-01-01 RX ADMIN — GLYCOPYRROLATE 0.4 MG: 0.2 INJECTION INTRAMUSCULAR; INTRAVENOUS at 04:20

## 2022-01-01 RX ADMIN — HYDROMORPHONE HYDROCHLORIDE 2 MG: 2 INJECTION, SOLUTION INTRAMUSCULAR; INTRAVENOUS; SUBCUTANEOUS at 12:49

## 2022-01-01 RX ADMIN — LORAZEPAM 0.5 MG: 0.5 TABLET ORAL at 08:57

## 2022-01-01 RX ADMIN — ENOXAPARIN SODIUM 90 MG: 100 INJECTION SUBCUTANEOUS at 15:13

## 2022-01-01 RX ADMIN — PRIMIDONE 50 MG: 50 TABLET ORAL at 08:50

## 2022-01-01 RX ADMIN — FAMOTIDINE 20 MG: 20 TABLET ORAL at 17:51

## 2022-01-01 RX ADMIN — CHLORHEXIDINE GLUCONATE 15 ML: 1.2 RINSE ORAL at 08:10

## 2022-01-01 RX ADMIN — CHLORHEXIDINE GLUCONATE 15 ML: 1.2 RINSE ORAL at 20:13

## 2022-01-01 RX ADMIN — LORAZEPAM 2 MG: 2 INJECTION INTRAMUSCULAR; INTRAVENOUS at 09:36

## 2022-01-01 RX ADMIN — GLYCOPYRROLATE 0.4 MG: 0.2 INJECTION INTRAMUSCULAR; INTRAVENOUS at 12:54

## 2022-01-01 RX ADMIN — LORAZEPAM 2 MG: 2 INJECTION INTRAMUSCULAR; INTRAVENOUS at 08:41

## 2022-01-01 RX ADMIN — DOCUSATE SODIUM 50MG AND SENNOSIDES 8.6MG 2 TABLET: 8.6; 5 TABLET, FILM COATED ORAL at 08:47

## 2022-01-01 RX ADMIN — MORPHINE SULFATE 4 MG: 4 INJECTION, SOLUTION INTRAMUSCULAR; INTRAVENOUS at 04:48

## 2022-01-01 RX ADMIN — SODIUM CHLORIDE 150 ML/HR: 9 INJECTION, SOLUTION INTRAVENOUS at 19:24

## 2022-01-01 RX ADMIN — ALBUTEROL SULFATE 2.5 MG: 2.5 SOLUTION RESPIRATORY (INHALATION) at 17:23

## 2022-01-01 RX ADMIN — TRAMADOL HYDROCHLORIDE 50 MG: 50 TABLET, COATED ORAL at 08:25

## 2022-01-01 RX ADMIN — ALBUTEROL SULFATE 2.5 MG: 2.5 SOLUTION RESPIRATORY (INHALATION) at 20:59

## 2022-01-01 RX ADMIN — SODIUM CHLORIDE 150 ML/HR: 9 INJECTION, SOLUTION INTRAVENOUS at 09:25

## 2022-01-01 RX ADMIN — GLYCOPYRROLATE 0.8 MG: 0.2 INJECTION INTRAMUSCULAR; INTRAVENOUS at 18:39

## 2022-01-01 RX ADMIN — CEFEPIME 2 G: 2 INJECTION, POWDER, FOR SOLUTION INTRAVENOUS at 04:13

## 2022-01-01 RX ADMIN — GLYCOPYRROLATE 0.4 MG: 0.2 INJECTION INTRAMUSCULAR; INTRAVENOUS at 21:04

## 2022-01-01 RX ADMIN — CITALOPRAM 10 MG: 10 TABLET, FILM COATED ORAL at 21:03

## 2022-01-01 RX ADMIN — ALBUTEROL SULFATE 2.5 MG: 2.5 SOLUTION RESPIRATORY (INHALATION) at 12:09

## 2022-01-01 RX ADMIN — MEMANTINE HYDROCHLORIDE 10 MG: 10 TABLET, FILM COATED ORAL at 21:49

## 2022-01-01 RX ADMIN — HALOPERIDOL 1 MG: 1 TABLET ORAL at 14:39

## 2022-01-01 RX ADMIN — LORAZEPAM 1 MG: 2 INJECTION INTRAMUSCULAR; INTRAVENOUS at 10:05

## 2022-01-01 RX ADMIN — CEFEPIME 2 G: 2 INJECTION, POWDER, FOR SOLUTION INTRAVENOUS at 13:33

## 2022-05-25 NOTE — CASE MANAGEMENT/SOCIAL WORK
Per request from RN, spoke w/ [facility] and arranged transport back to [facility]. ETA is midnight. RN updated

## 2022-05-25 NOTE — DISCHARGE INSTRUCTIONS
Home to rest  Tylenol for pain  Follow up with orthopedics   Return if worse or new concerns   Continue care with your primary care physician and have your blood pressure regularly checked and managed. Normal blood pressure is 120/80.

## 2022-05-25 NOTE — ED PROVIDER NOTES
MD ATTESTATION NOTE    The MAGNO and I have discussed this patient's history, physical exam, and treatment plan.  I have reviewed the documentation and personally had a face to face interaction with the patient. I affirm the documentation and agree with the treatment and plan.  The attached note describes my personal findings.      I provided a substantive portion of the care of the patient.  I personally performed the physical exam in its entirety, and below are my findings.  For this patient encounter, the patient wore surgical mask, I wore full protective PPE including N95 and eye protection.      Brief HPI: Patient presents for unwitnessed fall with hip pain.  Patient cannot give significant history.    PHYSICAL EXAM  ED Triage Vitals [05/25/22 1055]   Temp Heart Rate Resp BP SpO2   96.9 °F (36.1 °C) 71 16 111/62 99 %      Temp src Heart Rate Source Patient Position BP Location FiO2 (%)   Tympanic Monitor Lying -- --         GENERAL: no acute distress  HENT: nares patent  EYES: no scleral icterus  CV: regular rhythm, normal rate  RESPIRATORY: normal effort  ABDOMEN: soft  MUSCULOSKELETAL: no deformity.  Pain with movement mostly of right hip  NEURO: Moves all extremities  PSYCH , cooperative  SKIN: warm, dry    Vital signs and nursing notes reviewed.        Plan: X-rays negative.  Patient will get CT pelvis       Durga Sandoval MD  05/25/22 2532

## 2022-05-25 NOTE — ED TRIAGE NOTES
Pt arrives via EMS from [facility]. States that he had a fall. Groans ans winces when ever his left lower leg is touched. States that they did xrays that were negative but the pt continues to cry out inpain when ever touched or moved. Pt has severe dementia and is only A/Ox1 as baseline. Pt is at his baseline.     Patient masked at arrival and triage staff wore all appropriate PPE during entire encounter with patient.

## 2022-05-25 NOTE — ED PROVIDER NOTES
EMERGENCY DEPARTMENT ENCOUNTER    Room Number:  02/02  Date of encounter:  5/25/2022  PCP: Joel Villarreal MD  Historian: patient   Full history not obtainable due to: Dementia      HPI:  Chief Complaint: Pelvic pain     Context: Harsha Mejia is a 76 y.o. male who presents to the ED c/o pelvic pain onset after unwitnessed fall. The pt is from [facility] and staff reported an xr was obtained and normal but the pt groans and appears in pain when his left leg is touched. He cannot give me any history. Hx of dementia. Hx is limited         PAST MEDICAL HISTORY    Active Ambulatory Problems     Diagnosis Date Noted   • Closed displaced fracture of right femoral neck (HCC) 08/06/2018   • Syncope 01/29/2021     Resolved Ambulatory Problems     Diagnosis Date Noted   • No Resolved Ambulatory Problems     Past Medical History:   Diagnosis Date   • Arthritis    • Bradycardia    • Dementia (CMS/HCC)    • DVT (deep venous thrombosis) (CMS/[facility])    • Hyperlipidemia    • Vasovagal syncope          PAST SURGICAL HISTORY  Past Surgical History:   Procedure Laterality Date   • HIP PERCUTANEOUS PINNING Right 8/7/2018    Procedure: CLOSED REDUCTION AND PINNING RIGHT HIP FRACTURE;  Surgeon: Saqib Castle Jr., MD;  Location: [facility];  Service: Orthopedics         FAMILY HISTORY  No family history on file.      SOCIAL HISTORY  Social History     Socioeconomic History   • Marital status:    Tobacco Use   • Smoking status: Former Smoker   • Smokeless tobacco: Never Used   Substance and Sexual Activity   • Alcohol use: No   • Drug use: No   • Sexual activity: Defer         ALLERGIES  Codeine and Penicillins        REVIEW OF SYSTEMS  Review of Systems   All systems reviewed and marked as negative except as listed in HPI       PHYSICAL EXAM    I have reviewed the triage vital signs and nursing notes.    ED Triage Vitals [05/25/22 1055]   Temp Heart Rate Resp BP SpO2   96.9 °F (36.1 °C) 71 16 111/62 99 %       Temp src Heart Rate Source Patient Position BP Location FiO2 (%)   Tympanic Monitor Lying -- --       GENERAL: alert well developed, well nourished in no distress, appears older than stated age. Frail.   HENT: NCAT, neck supple, trachea midline  EYES: no scleral icterus, PERRL, normal conjunctivae  CV: regular rhythm, regular rate, no murmur  RESPIRATORY: unlabored effort, CTAB  ABDOMEN: soft, nontender, nondistended, bowel sounds present  MUSCULOSKELETAL: no gross deformity. No tenderness of pelvis on exam. There is pain with abduction and adduction of the L and R hip. No tenderness of the left or right lower extremity. No tenderness of the vertebral spine.   NEURO: alert,  sensory and motor function of extremities grossly intact, speech clear, mental status normal/baseline  SKIN: warm, dry, no rash  PSYCH:  Appropriate mood and affect    Vital signs and nursing notes reviewed.        RADIOLOGY  CT Head Without Contrast    Result Date: 5/25/2022  CT HEAD WITHOUT CONTRAST  HISTORY: Fall, headache.  COMPARISON: CT head 01/29/2021.  FINDINGS: There is age-appropriate atrophy. Confluent areas of decreased attenuation involving the white matter of the cerebral hemispheres are noted bilaterally, consistent with extensive small vessel ischemic disease, similar in appearance as compared to prior examination. There is no evidence of fracture, intracranial hemorrhage or of a focal area of decreased attenuation to suggest acute infarction. Further evaluation could be performed with a MRI examination of the brain as indicated.    Radiation dose reduction techniques were utilized, including automated exposure control and exposure modulation based on body size.  This report was finalized on 5/25/2022 1:09 PM by Dr. Eliezer Espinal M.D.      CT Pelvis Without Contrast    Result Date: 5/25/2022  CT PELVIS WITHOUT CONTRAST  HISTORY: Bilateral hip and pelvic pain. Fall.  TECHNIQUE: CT includes axial imaging through the pelvis and  data reconstructed in coronal and sagittal planes.  COMPARISON: X-rays of the pelvis and both hips 05/25/2022.  FINDINGS: 3 pins transfix an old healed right femoral neck fracture. Evaluation for acute fracture is limited by the degree of osteopenia. There is an acute fracture of the right sacral [place] coursing through the right sacral alar cortex. There is also a subtle fracture of the left pubic body coursing through the superior aspect of the cortex just to the left of the sacroiliac joint and into the left superior pubic ramus and into the anterior aspect of the left inferior pubic ramus. There are mild arthritic changes of both hips, greater on the right. There is partial ankylosis of the sacroiliac joints. Degenerative disc disease and facet arthritis are present at L5-S1 where there is moderate foraminal narrowing.  Atherosclerotic disease present involving the abdominal aorta and iliac vasculature.      1. Acute fracture of the right sacral ala. 2. Acute fracture of the left pubic body extending into the left superior pubic ramus into the anterior aspect of the left inferior pubic ramus. 3. Osteopenia. 4. 3 pins transfix an old healed right femoral neck fracture. Mild osteoarthritic changes both hips, greater on the right. 5. Partial ankylosis of both sacral iliac joints. Degenerative disc disease and facet arthritis at L5-S1 where there is mild foraminal narrowing.  Radiation dose reduction techniques were utilized, including automated exposure control and exposure modulation based on body size.  This report was finalized on 5/25/2022 3:05 PM by Dr. Carloz Cabral M.D.      XR Spine Lumbar Complete 4+VW    Result Date: 5/25/2022  XR SPINE LUMBAR COMPLETE 4+VW-  Clinical: Back pain  FINDINGS: There is mild wedging of the L1 vertebrae, age indeterminant. Multilevel disc degeneration involving the lower thoracic and lumbar spine. This is moderate to substantial. No spondylolysis or spondylolisthesis, lower  lumbar facet hypertrophy. Multilevel osteophyte formation. Paravertebral soft tissues within normal limits.  CONCLUSION: Degenerative change as described above, mild wedging of the L1 vertebrae, age indeterminant however appears likely old.  This report was finalized on 5/25/2022 12:57 PM by Dr. Jaskaran Mcfarlane M.D.      XR Hips Bilateral With or Without Pelvis 2 View    Result Date: 5/25/2022  XR HIPS BILATERAL W OR WO PELVIS 2 VIEW-AP pelvis bilateral hips clinical information fell, pain  COMPARISON 8/6/2018  FINDINGS: There are 3 orthopedic screws within the right femur crossing the femoral neck. Healed fracture at this location, no new fracture of the right hip has developed. The fixation screw position is satisfactory without loosening. The right hemipelvis is satisfactory in appearance.  No left hip fracture or avascular necrosis. The left hemipelvis is typical in appearance.  Soft tissues have a satisfactory appearance.  CONCLUSION: No acute osseous or articular abnormality. Orthopedic screws within the right femur are satisfactory in position, healed right femoral neck fracture.  This report was finalized on 5/25/2022 1:01 PM by Dr. Jaskaran Mcfarlane M.D.        I ordered the above noted radiological studies. Independently reviewed by me and discussed with radiologist.  See dictation above for official radiology interpretation.      PROCEDURES    Procedures        MEDICATIONS GIVEN IN ER    Medications - No data to display      PROGRESS, DATA ANALYSIS, CONSULTS, AND MEDICAL DECISION MAKING    All labs have been independently reviewed by me.  All radiology studies have been reviewed by me.   EKG's independently reviewed by me.  Discussion below represents my analysis of pertinent findings related to patient's condition, differential diagnosis, treatment plan and final disposition.    DIFFERENTIAL DIAGNOSIS INCLUDE BUT NOT LIMITED TO: Hip fracture, hip dislocation, hip contusion, hematoma, lumbar radiculopathy,  pelvic fracture, iliopsoas strain , labral tear, bursitis, tendonitis, hernia, UTI      ED Course as of 05/25/22 1515   Wed May 25, 2022   1300 I viewed CT head on PACS system.  My findings are no midline shift. [JS]      ED Course User Index  [JS] Evelin Fulton APRN     MDM: Patient presents with unwitnessed fall from [facility].  X-ray done at facility was negative.  The patient has pain with abduction and abduction of the right and left hip.  CT head was negative.  X-ray of the lumbar spine was negative.  X-ray of the pelvis here was negative as well however CT of the pelvis showed fracture on the right and left side respectively.  These are nonoperative fractures and he will be referred to orthopedics and discharged back to facility.    BP - 132/60  HR - 57  TEMP - 96.9 °F (36.1 °C) (Tympanic)  O2 SATS - 92%        DIAGNOSIS  Final diagnoses:   Pelvic pain   Unwitnessed fall   Dementia without behavioral disturbance, unspecified dementia type (HCC)   Closed fracture of superior ramus of left pubis, initial encounter ([facility])   Closed fracture of other parts of pelvis, initial encounter (HCC)         DISPOSITION  Discharge     Pt masked in first look. I wore appropriate PPE throughout my encounters with the pt. I performed hand hygiene on entry into the pt room and upon exit.     Dictated utilizing Dragon dictation:  Much of this encounter note is an electronic transcription/translation of spoken language to printed text.      Evelin Fulton APRN  05/25/22 0872

## 2022-11-20 PROBLEM — J96.01 ACUTE HYPOXEMIC RESPIRATORY FAILURE (HCC): Status: ACTIVE | Noted: 2022-01-01

## 2022-11-27 PROBLEM — Z74.09 IMMOBILITY: Status: ACTIVE | Noted: 2022-01-01

## 2022-11-27 PROBLEM — F03.90 DEMENTIA (HCC): Status: ACTIVE | Noted: 2022-01-01

## 2022-11-27 PROBLEM — D63.8 ANEMIA, CHRONIC DISEASE: Status: ACTIVE | Noted: 2022-01-01

## 2022-11-27 PROBLEM — J69.0 ASPIRATION PNEUMONIA DUE TO VOMITUS: Status: ACTIVE | Noted: 2022-01-01

## 2022-11-27 PROBLEM — G93.41 METABOLIC ENCEPHALOPATHY: Status: ACTIVE | Noted: 2022-01-01

## 2022-11-27 PROBLEM — A41.9 SHOCK, SEPTIC (HCC): Status: ACTIVE | Noted: 2022-01-01

## 2022-11-27 PROBLEM — R65.21 SHOCK, SEPTIC: Status: ACTIVE | Noted: 2022-01-01

## 2022-11-30 PROBLEM — I67.9 CEREBROVASCULAR SMALL VESSEL DISEASE: Status: ACTIVE | Noted: 2022-01-01

## 2022-11-30 PROBLEM — I51.9 LEFT VENTRICULAR DIASTOLIC DYSFUNCTION: Status: ACTIVE | Noted: 2022-01-01

## 2022-11-30 PROBLEM — Z51.5 PALLIATIVE CARE BY SPECIALIST: Status: ACTIVE | Noted: 2022-01-01

## 2022-11-30 PROBLEM — F02.80 DEMENTIA DUE TO ANOTHER GENERAL MEDICAL CONDITION: Status: ACTIVE | Noted: 2022-01-01

## 2022-11-30 PROBLEM — E46 HYPOALBUMINEMIA DUE TO PROTEIN-CALORIE MALNUTRITION: Status: ACTIVE | Noted: 2022-01-01

## 2022-11-30 PROBLEM — R13.12 OROPHARYNGEAL DYSPHAGIA: Status: ACTIVE | Noted: 2022-01-01

## 2022-11-30 PROBLEM — G31.1 SENILE DEGENERATION OF BRAIN (HCC): Status: ACTIVE | Noted: 2022-01-01

## 2022-11-30 PROBLEM — E88.09 HYPOALBUMINEMIA DUE TO PROTEIN-CALORIE MALNUTRITION (HCC): Status: ACTIVE | Noted: 2022-01-01

## 2022-11-30 PROBLEM — I26.99 ACUTE PULMONARY EMBOLISM WITHOUT ACUTE COR PULMONALE: Status: ACTIVE | Noted: 2022-01-01

## 2022-12-01 NOTE — PROGRESS NOTES
Name: Harsha Mejia ADMIT: 2022   : 1945  PCP: Joel Villarreal MD    MRN: [id number] LOS: 11 days   AGE/SEX: 77 y.o. male  ROOM: [facility]   Subjective   Chief Complaint   Patient presents with   • Shortness of Breath   • Respiratory Distress      Poorly responsive. Moaning at times. Decreased breath sounds and rhonchi present.    Objective   Vital Signs  Temp:  [98.7 °F (37.1 °C)-100.2 °F (37.9 °C)] 98.8 °F (37.1 °C)  Heart Rate:  [] 100  Resp:  [18-22] 22  BP: (103-138)/(54-77) 138/77  SpO2:  [92 %-99 %] 96 %  on  Flow (L/min):  [3-4] 3;   Device (Oxygen Therapy): humidified;nasal cannula  Body mass index is 24.68 kg/m².    Physical Exam  Vitals and nursing note reviewed.   Constitutional:       Appearance: He is ill-appearing. He is not diaphoretic.   HENT:      Head: Normocephalic and atraumatic.   Eyes:      General:         Right eye: No discharge.         Left eye: No discharge.   Cardiovascular:      Rate and Rhythm: Normal rate and regular rhythm.   Pulmonary:      Breath sounds: Decreased breath sounds and rhonchi present. No wheezing.   Abdominal:      General: There is no distension.      Palpations: Abdomen is soft.      Tenderness: There is no abdominal tenderness. There is no guarding or rebound.   Musculoskeletal:         General: No swelling or tenderness.   Skin:     General: Skin is warm and dry.   Neurological:      Mental Status: He is disoriented.   Psychiatric:         Cognition and Memory: Cognition is impaired. Memory is impaired.         Results Review:       I reviewed the patient's new clinical results.      Results from last 7 days   Lab Units 22  0722  0722  0722  0519   WBC 10*3/mm3 18.50* 12.00* 10.70 8.69   HEMOGLOBIN g/dL 9.5* 10.6* 10.5* 10.4*   PLATELETS 10*3/mm3 460* 408 391 332     Results from last 7 days   Lab Units 22  0730 22  0738 22  0704 22  0519   SODIUM mmol/L 135* 136 138 137   POTASSIUM  mmol/L 4.6 4.5 4.1 4.1   CHLORIDE mmol/L 100 103 102 105   CO2 mmol/L 28.3 26.8 28.9 26.3   BUN mg/dL 21 15 16 17   CREATININE mg/dL 0.59* 0.58* 0.60* 0.64*   GLUCOSE mg/dL 136* 120* 107* 101*   Estimated Creatinine Clearance: 125.2 mL/min (A) (by C-G formula based on SCr of 0.59 mg/dL (L)).  Results from last 7 days   Lab Units 12/01/22  0730 11/30/22  0738 11/29/22  0704 11/28/22  0519 11/27/22  0625 11/26/22  0434   CALCIUM mg/dL 8.1* 8.3* 8.6 8.2*   < > 8.7   ALBUMIN g/dL 2.60* 2.40* 2.40* 1.90*   < > 2.60*   MAGNESIUM mg/dL  --   --  2.2  --   --  1.8   PHOSPHORUS mg/dL 3.3 3.5 3.2 3.1   < > 2.9    < > = values in this interval not displayed.     Results from last 7 days   Lab Units 11/25/22  0659 11/25/22  0015 11/24/22  1543   APTT seconds 78.9* 98.3* 139.9*        albuterol, 2.5 mg, Nebulization, 4x Daily - RT  citalopram, 10 mg, Oral, Nightly  enoxaparin, 1 mg/kg, Subcutaneous, Q12H  famotidine, 20 mg, Nasogastric, BID AC  haloperidol, 1 mg, Nasogastric, Q8H  LORazepam, 0.5 mg, Oral, BID  memantine, 10 mg, Oral, BID  polyethylene glycol, 17 g, Oral, Daily  primidone, 50 mg, Nasogastric, Daily  traMADol, 50 mg, Oral, BID  Valproic Acid, 125 mg, Nasogastric, Q8H       NPO Diet NPO Type: Strict NPO, Tube Feeding    Assessment & Plan      Active Hospital Problems    Diagnosis  POA   • Cerebrovascular small vessel disease [I67.9]  Yes   • Acute pulmonary embolism without acute cor pulmonale (HCC) [I26.99]  Yes   • Hypoalbuminemia due to protein-calorie malnutrition (HCC) [E88.09, E46]  Yes   • Left ventricular diastolic dysfunction [I51.9]  Yes   • Oropharyngeal dysphagia [R13.12]  Yes   • Palliative care by specialist [Z51.5]  Not Applicable   • 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       · Aspiration pneumonia: Completed cefepime course.  Blood cultures were contaminant.  ID evaluated.  Pulmonology evaluated.  Continued secretion issues despite n.p.o. status and I am anticipating he will either develop recurrent pneumonia or have continued pneumonitis and respiratory difficulty. Starting to develop sepsis again. Will repeat CXR and obtain procal..  · Acute PE/A. fib: On Lovenox therapeutic dose.  · Acute hypoxic respiratory failure: Ventilated from 11/20 through 24.  Having continued issues with secretions.  · COPD: No acute bronchospasm  · Hypotension due to septic shock: Resolved  · Hypertension: Acceptable acutely. Continue current regimen.  · Dementia: End stage. Palliative appropriate. Awaiting update from legal guardianship to determine if able to transition to palliative care goals.  · Prophylaxis: Lovenox as above  · Disposition: TBD    Calin Pablo MD  [facility]  12/01/22  10:03 EST    Dictated portions using Dragon dictation software.    During the entire encounter, I was wearing recommended PPE including face mask and eye protection. Hand sanitization was performed prior to entering room and upon exit.

## 2022-12-01 NOTE — PLAN OF CARE
Goal Outcome Evaluation:  Plan of Care Reviewed With: patient        Progress: declining  Outcome Evaluation: Pt remains incoherent moaning and coughing off and on tonight. Oral suction done off and on through the night. Restraints continue for pt pulling at lines. TF infusing as ordered per NGT. Palliative request sent to [facility] yesterday. BM x2 thus far this shift. Will conitnue to monitor.

## 2022-12-01 NOTE — PROGRESS NOTES
Discharge Planning Assessment  [facility]     Patient Name: Harsha Mejia  MRN: [id number]  Today's Date: 12/1/2022    Admit Date: 11/20/2022    Plan: [facility]   Discharge Needs Assessment    No documentation.                Discharge Plan     Row Name 12/01/22 1238       Plan    Plan Comments Re-faxed the palliative/comfort care request to MYRA Berry, RN  VANESA Nurse Consultant  Department for Aging and Independent Living  [facility]. MYRA Serrano, RN, CDP, CCP              Continued Care and Services - Admitted Since 11/20/2022     Destination     Service Provider Request Status Selected Services Address Phone Fax Patient Preferred    [facility] Accepted N/A [address] [id number] [phone] --              Expected Discharge Date and Time     Expected Discharge Date Expected Discharge Time    Dec 2, 2022          Demographic Summary    No documentation.                Functional Status    No documentation.                Psychosocial    No documentation.                Abuse/Neglect    No documentation.                Legal    No documentation.                Substance Abuse    No documentation.                Patient Forms    No documentation.                   Nena Dyson, RN

## 2022-12-02 NOTE — PLAN OF CARE
Goal Outcome Evaluation:           Progress: declining  Outcome Evaluation: Transitioned to full comfort measures per guardian. Updated son, Abdias, on phone--stated he discussed care with guardian and is in agreement with comfort measures. Responsive to pain. NG tube removed. F/C inserted for comfort. Medicated x2 with 4mg morphine and 1mg ativan, 0.2mg robinul. Appears comfortable. BM x1. Will cont to monitor

## 2022-12-02 NOTE — PLAN OF CARE
Goal Outcome Evaluation:  Plan of Care Reviewed With: patient        Progress: declining  Outcome Evaluation: Patient more somnolent throughout shift, bilateral wrist restraints removed. Patient confused and unable to make needs known, opens eyes with physical stimulation but makes no meaningful attempts at communication. Requires oral suctioning at times. O2>90% on 2lpm NC. Received paperwork from [facility]/nguyen regarding transitioning to hospice care, will communicate to attending for follow up in AM. Patient given 0.5mg of IV Ativan for periodic restlessness. Cortrak intack, tube feedings continued at this time. Nasal trumpet to right nare intact. Will continue to monitor.

## 2022-12-02 NOTE — PROGRESS NOTES
Name: Harsha Mejia ADMIT: 2022   : 1945  PCP: Joel Villarreal MD    MRN: [id number] LOS: 12 days   AGE/SEX: 77 y.o. male  ROOM: [facility]     Subjective   Subjective     The patient's guardian approved comfort care this morning. He minimally responsive, really only to pain on exam.       Objective   Objective   Vital Signs  Temp:  [96.9 °F (36.1 °C)-101.1 °F (38.4 °C)] 96.9 °F (36.1 °C)  Heart Rate:  [] 53  Resp:  [22-24] 22  BP: (101-140)/(54-77) 101/58  SpO2:  [94 %-100 %] 96 %  on  Flow (L/min):  [1-2] 1;   Device (Oxygen Therapy): humidified;nasal cannula  Body mass index is 24.68 kg/m².  Physical Exam  Constitutional:       General: He is not in acute distress.     Appearance: He is ill-appearing.   Cardiovascular:      Rate and Rhythm: Regular rhythm. Tachycardia present.      Heart sounds: Normal heart sounds.   Pulmonary:      Effort: No respiratory distress.      Breath sounds: Rhonchi present. No wheezing or rales.   Abdominal:      General: Bowel sounds are normal. There is no distension.      Palpations: Abdomen is soft.      Tenderness: There is no abdominal tenderness. There is no guarding or rebound.   Musculoskeletal:         General: No tenderness.      Right lower leg: No edema.      Left lower leg: No edema.   Psychiatric:         Mood and Affect: Mood normal.         Behavior: Behavior normal.         Results Review     I reviewed the patient's new clinical results.  Results from last 7 days   Lab Units 22  1348 22  0730 22  0738 22  0704   WBC 10*3/mm3 21.66* 18.50* 12.00* 10.70   HEMOGLOBIN g/dL 7.1* 9.5* 10.6* 10.5*   PLATELETS 10*3/mm3 358 460* 408 391     Results from last 7 days   Lab Units 22  0730 22  0738 22  0704 22  0519   SODIUM mmol/L 135* 136 138 137   POTASSIUM mmol/L 4.6 4.5 4.1 4.1   CHLORIDE mmol/L 100 103 102 105   CO2 mmol/L 28.3 26.8 28.9 26.3   BUN mg/dL 21 15 16 17   CREATININE mg/dL 0.59* 0.58* 0.60*  0.64*   GLUCOSE mg/dL 136* 120* 107* 101*   Estimated Creatinine Clearance: 125.2 mL/min (A) (by C-G formula based on SCr of 0.59 mg/dL (L)).  Results from last 7 days   Lab Units 12/01/22  0730 11/30/22  0738 11/29/22  0704 11/28/22  0519   ALBUMIN g/dL 2.60* 2.40* 2.40* 1.90*     Results from last 7 days   Lab Units 12/01/22  0730 11/30/22  0738 11/29/22  0704 11/28/22  0519 11/27/22  0625 11/26/22  0434   CALCIUM mg/dL 8.1* 8.3* 8.6 8.2*   < > 8.7   ALBUMIN g/dL 2.60* 2.40* 2.40* 1.90*   < > 2.60*   MAGNESIUM mg/dL  --   --  2.2  --   --  1.8   PHOSPHORUS mg/dL 3.3 3.5 3.2 3.1   < > 2.9    < > = values in this interval not displayed.     Results from last 7 days   Lab Units 12/01/22  0730   PROCALCITONIN ng/mL 0.20     COVID19   Date Value Ref Range Status   11/20/2022 Not Detected Not Detected - Ref. Range Final   01/29/2021 Not Detected Not Detected - Ref. Range Final     No results found for: HGBA1C, POCGLU    XR Chest 1 View  Narrative: XR CHEST 1 VW-     HISTORY: Male who is 77 years-old,  pneumonia, increased secretions     TECHNIQUE: Frontal view of the chest     COMPARISON: 11/26/2022     FINDINGS: NG tube extends beyond the diaphragm and off image inferiorly.  The heart size is normal. Aorta appears ectatic, calcified. Bibasilar  pulmonary opacities appear slightly decreased from prior exam, continued  follow-up recommended to characterize resolution and to exclude any  possibility of an underlying lesion. No pleural effusion, or  pneumothorax. No acute osseous process.     Impression: Persistent, slightly decreased basilar infiltrates. Ectatic  appearing aorta.     This report was finalized on 12/1/2022 5:34 PM by Dr. Tucker Santacruz M.D.       Scheduled Medications  LORazepam, 0.5 mg, Oral, BID  primidone, 50 mg, Nasogastric, Daily  Valproic Acid, 125 mg, Nasogastric, Q8H    Infusions   Diet  NPO Diet NPO Type: Strict NPO, Tube Feeding       Assessment/Plan     Active Hospital Problems    Diagnosis   POA   • Cerebrovascular small vessel disease [I67.9]  Yes   • Acute pulmonary embolism without acute cor pulmonale (HCC) [I26.99]  Yes   • Hypoalbuminemia due to protein-calorie malnutrition (HCC) [E88.09, E46]  Yes   • Left ventricular diastolic dysfunction [I51.9]  Yes   • Oropharyngeal dysphagia [R13.12]  Yes   • Palliative care by specialist [Z51.5]  Not Applicable   • 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       77 y.o. male admitted with <principal problem not specified>.    • Aspiration pneumonia-completed a course of antibiotics, but leukocytosis and fevers were worsening. Chest x-ray this morning showed slightly decreased basilar infiltrates. However, antibiotics have been stopped now that the patient is comfort care  • Acute PE/A. Fib-lovenox discontinued as not consistent with comfort care  • Acute hypoxic respiratory failure: Ventilated from  through .  Having continued issues with secretions.   • COPD: No acute bronchospasm  • Hypotension due to septic shock: Resolved  • Hypertension: Acceptable acutely, but medications will be discontinued for comfort care  · Dementia: End stage. Legal guardian has approved transition to comfort care  · Discontinue labs and all scheduled medications not for comfort  · Start palliative care order set  · Discussed with nursing staff.  · Anticipate that the patient will  in the hospital. Hospice is consulted      Noble Bauer MD  [facility]  22  17:38 EST    I wore protective equipment throughout this patient encounter  including a face mask, gloves and protective eyewear.  Hand hygiene was performed before donning protective equipment and after removal when leaving the room.

## 2022-12-02 NOTE — PROGRESS NOTES
Discharge Planning Assessment  [facility]     Patient Name: Harsha Mejia  MRN: [id number]  Today's Date: 12/2/2022    Admit Date: 11/20/2022    Plan: [facility]   Discharge Needs Assessment    No documentation.                Discharge Plan     Row Name 12/02/22 0859       Plan    Plan Comments Received the fax from [facility] and [facility] Office of the Commissioner/Maite Koehler/Nurse Consultant regarding Termination of life prolonging treatment, withholding of care and provide hospice with comfort measures Approval for Harsha Mejia as of 12/1/22. [facility] eval in [facility] and they will contact the Guardian for consents. ANN MARIE Dyson RN, CCP.              Continued Care and Services - Admitted Since 11/20/2022     Destination     Service Provider Request Status Selected Services Address Phone Fax Patient Preferred    [facility] Accepted N/A [address] [place] [address] [phone] --              Expected Discharge Date and Time     Expected Discharge Date Expected Discharge Time    Dec 2, 2022          Demographic Summary    No documentation.                Functional Status    No documentation.                Psychosocial    No documentation.                Abuse/Neglect    No documentation.                Legal    No documentation.                Substance Abuse    No documentation.                Patient Forms    No documentation.                   Nena Dyson RN

## 2022-12-02 NOTE — NURSING NOTE
Contacted provider on call regarding paperwork that was faxed from [facility] regarding level of care for patient effective 12/1/2022. Paperwork to be scanned into chart.

## 2022-12-03 NOTE — PROGRESS NOTES
Name: Harsha eMjia ADMIT: 2022   : 1945  PCP: Joel Villarreal MD    MRN: [id number] LOS: 13 days   AGE/SEX: 77 y.o. male  ROOM: [facility]     Subjective   Subjective     No events overnight. Th patient remains asleep and unresponsive but appears comfortable       Objective   Objective   Vital Signs  Temp:  [98 °F (36.7 °C)-98.3 °F (36.8 °C)] 98.3 °F (36.8 °C)  Heart Rate:  [88-97] 97  Resp:  [-24] 22  BP: ()/(53-59) 99/53  SpO2:  [98 %] 98 %  on  Flow (L/min):  [1] 1;   Device (Oxygen Therapy): room air  Body mass index is 24.68 kg/m².  Physical Exam  Constitutional:       General: He is not in acute distress.     Appearance: He is ill-appearing.   Cardiovascular:      Rate and Rhythm: Regular rhythm. Tachycardia present.      Heart sounds: Normal heart sounds.   Pulmonary:      Effort: No respiratory distress.      Breath sounds: Rhonchi present. No wheezing or rales.   Abdominal:      General: Bowel sounds are normal. There is no distension.      Palpations: Abdomen is soft.      Tenderness: There is no abdominal tenderness. There is no guarding or rebound.   Musculoskeletal:         General: No tenderness.      Right lower leg: No edema.      Left lower leg: No edema.   Neurological:      Mental Status: He is unresponsive.   Psychiatric:         Mood and Affect: Mood normal.         Behavior: Behavior normal.         Results Review     I reviewed the patient's new clinical results.  Results from last 7 days   Lab Units 22  1348 22  0730 22  0738 22  0704   WBC 10*3/mm3 21.66* 18.50* 12.00* 10.70   HEMOGLOBIN g/dL 7.1* 9.5* 10.6* 10.5*   PLATELETS 10*3/mm3 358 460* 408 391     Results from last 7 days   Lab Units 22  0730 22  0738 22  0704 22  0519   SODIUM mmol/L 135* 136 138 137   POTASSIUM mmol/L 4.6 4.5 4.1 4.1   CHLORIDE mmol/L 100 103 102 105   CO2 mmol/L 28.3 26.8 28.9 26.3   BUN mg/dL 21 15 16 17   CREATININE mg/dL 0.59* 0.58* 0.60*  0.64*   GLUCOSE mg/dL 136* 120* 107* 101*   Estimated Creatinine Clearance: 125.2 mL/min (A) (by C-G formula based on SCr of 0.59 mg/dL (L)).  Results from last 7 days   Lab Units 12/01/22  0730 11/30/22  0738 11/29/22  0704 11/28/22  0519   ALBUMIN g/dL 2.60* 2.40* 2.40* 1.90*     Results from last 7 days   Lab Units 12/01/22  0730 11/30/22  0738 11/29/22  0704 11/28/22  0519   CALCIUM mg/dL 8.1* 8.3* 8.6 8.2*   ALBUMIN g/dL 2.60* 2.40* 2.40* 1.90*   MAGNESIUM mg/dL  --   --  2.2  --    PHOSPHORUS mg/dL 3.3 3.5 3.2 3.1     Results from last 7 days   Lab Units 12/01/22  0730   PROCALCITONIN ng/mL 0.20     COVID19   Date Value Ref Range Status   11/20/2022 Not Detected Not Detected - Ref. Range Final   01/29/2021 Not Detected Not Detected - Ref. Range Final     No results found for: HGBA1C, POCGLU    XR Chest 1 View  Narrative: XR CHEST 1 VW-     HISTORY: Male who is 77 years-old,  pneumonia, increased secretions     TECHNIQUE: Frontal view of the chest     COMPARISON: 11/26/2022     FINDINGS: NG tube extends beyond the diaphragm and off image inferiorly.  The heart size is normal. Aorta appears ectatic, calcified. Bibasilar  pulmonary opacities appear slightly decreased from prior exam, continued  follow-up recommended to characterize resolution and to exclude any  possibility of an underlying lesion. No pleural effusion, or  pneumothorax. No acute osseous process.     Impression: Persistent, slightly decreased basilar infiltrates. Ectatic  appearing aorta.     This report was finalized on 12/1/2022 5:34 PM by Dr. Tucker Santacruz M.D.       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       77 y.o. male admitted with <principal problem not specified>.    • Aspiration pneumonia-completed a course of antibiotics, but leukocytosis and fevers were worsening. However, antibiotics have been stopped now that the patient is comfort care  • Acute PE/A. Fib-lovenox discontinued as not consistent with comfort care  • Acute hypoxic respiratory failure-Ventilated from  through 2424.    • COPD-No acute bronchospasm  • Hypotension due to septic shock-Resolved  • Hypertension-medications discontinued for comfort care  · Dementia-End stage. Legal guardian has approved transition to comfort care  · All labs and medications not for comfort have been discontinued  · Palliative care order set is in place  · The patient is not showing any signs of pain or anxiety or shortness of breath  · Anticipate that the patient will  in the hospital. Hospice is consulted      Noble Bauer MD  [facility]  22  18:12 EST    I wore protective equipment throughout this patient encounter including a face mask, gloves and protective eyewear.  Hand hygiene was performed before donning protective equipment and after removal when leaving the room.

## 2022-12-03 NOTE — NURSING NOTE
Per guardian after hours service (Rachel Naomi) information can be given to patient's sons Isaiah or Normal Abdias Mejia. Per Rachel if patient expires call guardian in regards to instructions for  home arrangements.

## 2022-12-03 NOTE — PLAN OF CARE
Goal Outcome Evaluation:  Plan of Care Reviewed With: patient        Progress: no change  Outcome Evaluation: Patient given 4mg IV morphine, 1mg IV Ativan and 0.2mg IV Robinul twice throughout shift for comfort, repositioned approximately every four hours and has remained comfortable. Patient on 1lpm of O2 at this time. No visitors overnight, will continue comfort measures.

## 2022-12-04 NOTE — PLAN OF CARE
Problem: Adult Inpatient Plan of Care  Goal: Plan of Care Review  Outcome: Ongoing, Progressing  Flowsheets (Taken 12/3/2022 1816)  Progress: no change  Plan of Care Reviewed With: patient  Outcome Evaluation: Patient medicated with morphine, robinul, and ativan prior to turns for symptom management. Patient appears comfortable at rest. No family present at bedside. Will continue to monitor per palliative goals of care.

## 2022-12-04 NOTE — PLAN OF CARE
Goal Outcome Evaluation:  Plan of Care Reviewed With: patient        Progress: declining  Outcome Evaluation: Continues to receive IV doses of morphine, robinul and ativan prior to turns for symptom management. Respirations shallow and labored at times.

## 2022-12-04 NOTE — PROGRESS NOTES
Name: Harsha Mejia ADMIT: 2022   : 1945  PCP: Joel Villarreal MD    MRN: [id number] LOS: 14 days   AGE/SEX: 77 y.o. male  ROOM: [facility]     Subjective   Subjective     No events overnight. Discussed with RN at bedside. Has mostly been comfortable except during turns. Has received 4 doses of robinul, morphine, ativan so far today.       Objective   Objective   Vital Signs  Temp:  [98.3 °F (36.8 °C)-100.8 °F (38.2 °C)] 100.8 °F (38.2 °C)  Heart Rate:  [] 118  Resp:  [22-24] 24  BP: ()/(53-54) 104/54  SpO2:  [79 %-98 %] 79 %  on   ;   Device (Oxygen Therapy): room air  Body mass index is 24.68 kg/m².  Physical Exam  Constitutional:       General: He is not in acute distress.     Appearance: He is ill-appearing.   Cardiovascular:      Rate and Rhythm: Regular rhythm. Tachycardia present.      Heart sounds: Normal heart sounds.   Pulmonary:      Effort: No respiratory distress.      Breath sounds: Rhonchi present. No wheezing or rales.   Abdominal:      General: Bowel sounds are normal. There is no distension.      Palpations: Abdomen is soft.      Tenderness: There is no abdominal tenderness. There is no guarding or rebound.   Musculoskeletal:         General: No tenderness.      Right lower leg: No edema.      Left lower leg: No edema.   Neurological:      Mental Status: He is unresponsive.   Psychiatric:         Mood and Affect: Mood normal.         Behavior: Behavior normal.         Results Review     I reviewed the patient's new clinical results.  Results from last 7 days   Lab Units 22  1348 22  0730 22  0738 22  0704   WBC 10*3/mm3 21.66* 18.50* 12.00* 10.70   HEMOGLOBIN g/dL 7.1* 9.5* 10.6* 10.5*   PLATELETS 10*3/mm3 358 460* 408 391     Results from last 7 days   Lab Units 22  0730 22  0738 22  0704 22  0519   SODIUM mmol/L 135* 136 138 137   POTASSIUM mmol/L 4.6 4.5 4.1 4.1   CHLORIDE mmol/L 100 103 102 105   CO2 mmol/L 28.3 26.8  28.9 26.3   BUN mg/dL 21 15 16 17   CREATININE mg/dL 0.59* 0.58* 0.60* 0.64*   GLUCOSE mg/dL 136* 120* 107* 101*   Estimated Creatinine Clearance: 125.2 mL/min (A) (by C-G formula based on SCr of 0.59 mg/dL (L)).  Results from last 7 days   Lab Units 12/01/22  0730 11/30/22  0738 11/29/22  0704 11/28/22  0519   ALBUMIN g/dL 2.60* 2.40* 2.40* 1.90*     Results from last 7 days   Lab Units 12/01/22  0730 11/30/22  0738 11/29/22  0704 11/28/22  0519   CALCIUM mg/dL 8.1* 8.3* 8.6 8.2*   ALBUMIN g/dL 2.60* 2.40* 2.40* 1.90*   MAGNESIUM mg/dL  --   --  2.2  --    PHOSPHORUS mg/dL 3.3 3.5 3.2 3.1     Results from last 7 days   Lab Units 12/01/22 0730   PROCALCITONIN ng/mL 0.20     COVID19   Date Value Ref Range Status   11/20/2022 Not Detected Not Detected - Ref. Range Final   01/29/2021 Not Detected Not Detected - Ref. Range Final     No results found for: HGBA1C, POCGLU    XR Chest 1 View  Narrative: XR CHEST 1 VW-     HISTORY: Male who is 77 years-old,  pneumonia, increased secretions     TECHNIQUE: Frontal view of the chest     COMPARISON: 11/26/2022     FINDINGS: NG tube extends beyond the diaphragm and off image inferiorly.  The heart size is normal. Aorta appears ectatic, calcified. Bibasilar  pulmonary opacities appear slightly decreased from prior exam, continued  follow-up recommended to characterize resolution and to exclude any  possibility of an underlying lesion. No pleural effusion, or  pneumothorax. No acute osseous process.     Impression: Persistent, slightly decreased basilar infiltrates. Ectatic  appearing aorta.     This report was finalized on 12/1/2022 5:34 PM by Dr. Tucker Santacruz M.D.       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       77 y.o. male admitted with <principal problem not specified>.    • Aspiration pneumonia-completed a course of antibiotics, but leukocytosis and fevers were worsening. Antibiotics have now been stopped since the patient is comfort care  • Acute PE/A. Fib-lovenox discontinued as not consistent with comfort care  • Acute hypoxic respiratory failure-Ventilated from 11/20 through 11/2424.    • COPD-No acute bronchospasm  • Hypotension due to septic shock-Resolved  • Hypertension-medications discontinued for comfort care  · Dementia-End stage. Legal guardian has approved transition to comfort care  · All labs and medications not for comfort have been discontinued  · Palliative care order set is in place  · Anticipate that the patient will be transitioned to a hospice scatter bed based on the amount of pain and anxiety medication he is requiring. Hospice is consulted      Noble Bauer MD  [facility]  12/04/22  15:13 EST    I wore protective equipment throughout this patient encounter including a face mask, gloves and protective eyewear.  Hand hygiene was performed before donning protective equipment and after removal when leaving the room.

## 2022-12-05 NOTE — PROGRESS NOTES
Case Management Discharge Note      Final Note: admitted to a [facility] scattered bed on 12/5/22. ANN MARIE Dyson RN, CCP    Provided Post Acute Provider List?: N/A  Provided Post Acute Provider Quality & Resource List?: N/A    Selected Continued Care - Admitted Since 11/20/2022     Destination Coordination complete.    Service Provider Selected Services Address Phone Fax Patient Preferred    [facility] [address], [facility] [address] [phone] --          Durable Medical Equipment    No services have been selected for the patient.              Dialysis/Infusion    No services have been selected for the patient.              Home Medical Care    No services have been selected for the patient.              Therapy    No services have been selected for the patient.              Community Resources    No services have been selected for the patient.              Community & DME    No services have been selected for the patient.                       Final Discharge Disposition Code: 51 - hospice medical facility

## 2022-12-05 NOTE — PROGRESS NOTES
Went and saw pt. Pt is in full comfort care. Pt on room air and in no distress. NT suctioning is not needed.

## 2022-12-05 NOTE — PLAN OF CARE
Goal Outcome Evaluation:  Plan of Care Reviewed With: patient        Progress: declining  Outcome Evaluation: Premed prior to turns with morphine 4mg (increased to dilaudid 1mg this AM for better symptom management), ativan 2mg, and robinul 0.4mg. Turned side to side in recoery position. Oral suction completed several times through the night for secretions. Will continue to monitor for comfort.

## 2022-12-05 NOTE — CONSULTS
[facility] Admission: 12/5/22  hospitals ID: [id number]  Diagnosis: Senile Degeneration of the Brain [G31.1], Aspiration Pneumonia [J69.0], Mtabolic Encephalopathy [G93.41], Acute Hypoxic Respiratory Failure [J96.01], Protein Caloris Malnutrition [E46], Dysphagia [R13.12], Cerebral small vessel disease [I67.9], Acute Pulmonary Embolism [I26.99], Left Ventricular Diastolic Disease [I51.9], COPD [J44.9].  Symptom Management: Excess salivation/Pain/Dyspnea/Anxiety    On assessment patient sleeping/unable to awaken but did grimace when touched. No family at bedside. hospitals consent forms completed and signed by State Guardian Glennabalta Hanks.   Benefit change completed and copy left with Nena Dyson CCP. hospitals daily visits will begin tomorrow 12/5/22.    Please call with any questions, concerns, or change in patient status. Thank you for allowing us the opportunity to participate in the care of this patient/family.    Marianne Aguilar, RN, BSN  hospitals Admissions  [phone]

## 2022-12-05 NOTE — DISCHARGE SUMMARY
Patient Name: Harsha Mejia  : 1945  MRN: [id number]    Date of Admission: 2022  Date of Discharge:  2022  Primary Care Physician: Joel Villarreal MD      Chief Complaint:   Shortness of Breath and Respiratory Distress      Discharge Diagnoses     Active Hospital Problems    Diagnosis  POA   • Cerebrovascular small vessel disease [I67.9]  Yes   • Acute pulmonary embolism without acute cor pulmonale (HCC) [I26.99]  Yes   • Hypoalbuminemia due to protein-calorie malnutrition (HCC) [E88.09, E46]  Yes   • Left ventricular diastolic dysfunction [I51.9]  Yes   • Oropharyngeal dysphagia [R13.12]  Yes   • 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        Hospital Course     Mr. Mejia is a 77 y.o. male nursing home resident with a history of dementia, DVT and diastolic CHF who presented to [facility] initially complaining of copious secretions and difficulty breathing.  Please see the admitting history and physical for further details.  He was intubated in the emergency room and admitted to ICU.  He was felt to have bilateral aspiration pneumonia and PE and was started on IV antibiotics and therapeutic Lovenox.  Eventually NG tube was placed for feeding purposes.  He was eventually extubated after about 4 days and stabilized enough to come out of ICU at which point we were consulted.  However soon after arrival to the floor he started to develop sepsis again and worsening pneumonia.  Palliative care consult was obtained and he was felt appropriate for full comfort measures by both  our service as well as Dr. Ahmadi with palliative.  Process was initiated to make him fully palliative with his state guardian and this has been approved.  He has been deemed appropriate for hospice admission and scatter bed status and this is being performed today.  Continue full comfort measures in the meantime.        Day of Discharge     Subjective:  No response verbally.  Moans to stimuli per nursing    Physical Exam:  Temp:  [100.3 °F (37.9 °C)-101.6 °F (38.7 °C)] 100.3 °F (37.9 °C)  Heart Rate:  [100-101] 101  Resp:  [16-20] 16  BP: ()/(53-64) 98/53  Body mass index is 24.68 kg/m².  Physical Exam  Vitals reviewed.   Constitutional:       General: He is not in acute distress.     Appearance: He is ill-appearing.   Cardiovascular:      Rate and Rhythm: Tachycardia present.      Heart sounds: Normal heart sounds.   Pulmonary:      Effort: No respiratory distress.      Breath sounds: Rhonchi and rales present. No wheezing.   Abdominal:      General: There is no distension.      Palpations: Abdomen is soft.   Musculoskeletal:      Right lower leg: No edema.      Left lower leg: No edema.   Skin:     General: Skin is warm and dry.   Neurological:      Mental Status: He is unresponsive.         Consultants     Consult Orders (all) (From admission, onward)     Start     Ordered    12/02/22 0945  Consult to Pastoral/Spiritual Care  Once        Provider:  (Not yet assigned)    12/02/22 0945 12/02/22 0945  Consult [facility]  Once        Specialty:  Hospice and Palliative Medicine  Provider:  (Not yet assigned)    12/02/22 0945 12/02/22 0945  Inpatient consult to Case Management   Once        Provider:  (Not yet assigned)    12/02/22 0945 12/02/22 0859  Inpatient Hospice / [facility] Consult  Once        Specialty:  Hospice and Palliative Medicine  Provider:  (Not yet assigned)    12/02/22 0859 11/30/22 0702  Inpatient Palliative Care MD Consult  IN AM        Specialty:  Hospice and  Palliative Medicine  Provider:  Will Ahmadi MD    11/29/22 1521    11/28/22 1717  Inpatient Palliative Care Team Consult  Once        Provider:  (Not yet assigned)    11/28/22 1716    11/28/22 0051  Inpatient Palliative Care Team Consult  Once,   Status:  Canceled        Provider:  (Not yet assigned)    11/28/22 0051    11/27/22 0909  Inpatient Internal Medicine Consult  Once,   Status:  Canceled        Specialty:  Internal Medicine  Provider:  Theodore Esteves MD    11/27/22 0908    11/22/22 0754  Inpatient Infectious Diseases Consult  Once        Specialty:  Infectious Diseases  Provider:  Mikey Decker MD    11/22/22 0754    11/20/22 1819  Inpatient consult to Nutrition Services  Once,   Status:  Canceled        Provider:  (Not yet assigned)    11/20/22 1820    11/20/22 1358  Pulmonology (on-call MD unless specified)  Once,   Status:  Canceled        Specialty:  Pulmonary Disease  Provider:  Varinder Rebollar MD    11/20/22 1357    Signed and Held  Inpatient consult to Case Management   Once        Provider:  (Not yet assigned)    Signed and Held              Procedures       Imaging Results (All)     Procedure Component Value Units Date/Time    XR Chest 1 View [[id number]] Collected: 12/01/22 1732     Updated: 12/01/22 1737    Narrative:      XR CHEST 1 VW-     HISTORY: Male who is 77 years-old,  pneumonia, increased secretions     TECHNIQUE: Frontal view of the chest     COMPARISON: 11/26/2022     FINDINGS: NG tube extends beyond the diaphragm and off image inferiorly.  The heart size is normal. Aorta appears ectatic, calcified. Bibasilar  pulmonary opacities appear slightly decreased from prior exam, continued  follow-up recommended to characterize resolution and to exclude any  possibility of an underlying lesion. No pleural effusion, or  pneumothorax. No acute osseous process.       Impression:      Persistent, slightly decreased basilar infiltrates.  Ectatic  appearing aorta.     This report was finalized on 12/1/2022 5:34 PM by Dr. Tucker Santacruz M.D.       XR Chest 1 View [[id number]] Collected: 11/26/22 1031     Updated: 11/26/22 1034    Narrative:      CHEST, KUB     HISTORY: Nasogastric tube placement. Respiratory failure.     COMPARISON: AP chest 11/21/2022, KUB 11/20/2022.     FINDINGS:  AP PORTABLE UPRIGHT CHEST: Heart size is within normal limits. The  thoracic aorta is tortuous and aortic vascular calcifications are  present. There is enlargement of the ascending thoracic aorta as  demonstrated on previous CT. There are patchy bilateral lower lobe  infiltrates which appear mildly progressive when compared to exam 5 days  ago. There is no perihilar edema or pneumothorax. A right subclavian  central venous catheter tip extends into the SVC.     KUB: There has been placement of a feeding tube which extends into the  stomach. There is moderate gas and stool distention of the colon that  appears progressive when compared to exam 6 days ago.       Impression:      1. Patchy bilateral pulmonary infiltrates with progression when compared  to exam 5 days ago.  2. Feeding tube tip extends into the stomach.  3. Right subclavian Mediport tip is in the SVC.  4. Moderate gas and stool distention of the colon with progression when  compared to exam 6 days ago.     This report was finalized on 11/26/2022 10:31 AM by Dr. Carloz Cabral M.D.       XR Abdomen KUB [[id number]] Collected: 11/26/22 1031     Updated: 11/26/22 1034    Narrative:      CHEST, KUB     HISTORY: Nasogastric tube placement. Respiratory failure.     COMPARISON: AP chest 11/21/2022, KUB 11/20/2022.     FINDINGS:  AP PORTABLE UPRIGHT CHEST: Heart size is within normal limits. The  thoracic aorta is tortuous and aortic vascular calcifications are  present. There is enlargement of the ascending thoracic aorta as  demonstrated on previous CT. There are patchy bilateral lower lobe  infiltrates which  appear mildly progressive when compared to exam 5 days  ago. There is no perihilar edema or pneumothorax. A right subclavian  central venous catheter tip extends into the SVC.     KUB: There has been placement of a feeding tube which extends into the  stomach. There is moderate gas and stool distention of the colon that  appears progressive when compared to exam 6 days ago.       Impression:      1. Patchy bilateral pulmonary infiltrates with progression when compared  to exam 5 days ago.  2. Feeding tube tip extends into the stomach.  3. Right subclavian Mediport tip is in the SVC.  4. Moderate gas and stool distention of the colon with progression when  compared to exam 6 days ago.     This report was finalized on 11/26/2022 10:31 AM by Dr. Carloz Cabral M.D.       XR Chest 1 View [[id number]] Collected: 11/21/22 1221     Updated: 11/21/22 1226    Narrative:      XR CHEST 1 VW-     HISTORY: Male who is 77 years-old,  CVC     TECHNIQUE: Frontal view of the chest     COMPARISON: 11/21/2022 at 0327 hours     FINDINGS: Stable appearing endotracheal tube, NG tube (partly included).  A right subclavian catheter extends to the superior vena cava. The heart  size is normal. Aorta is calcified. Pulmonary vasculature is  unremarkable. Patchy opacities at the bases, right more than left,  appear similar to prior exam. No pleural effusion, or pneumothorax. Skin  folds are seen on the left. No acute osseous process.       Impression:      No significant change.     This report was finalized on 11/21/2022 12:23 PM by Dr. Tucker Santcaruz M.D.       XR Chest 1 View [[id number]] Collected: 11/21/22 0423     Updated: 11/21/22 0423    Narrative:        Patient: PASCUAL LAUREN  Time Out: 04:22  Exam(s): FILM CXR 1 VIEW     EXAM:    XR Chest, 1 View    CLINICAL HISTORY:     Reason for exam: fu pneumonia.    TECHNIQUE:    Frontal view of the chest.    COMPARISON:    Chest x-ray 11 20 2022 at 1022 hrs.    FINDINGS:    Lungs:  Lung  volumes are slightly low with patchy opacities now present   in the right lung base.    Pleural space:  Unremarkable.  No pneumothorax.    Heart:  Unremarkable.  No cardiomegaly.    Mediastinum:  Unremarkable.    Bones joints:  Unremarkable.    Vasculature:  Tortuous, partly calcified thoracic aorta.    Tubes, lines and devices:  Continued eventually intubation with ET tube   terminating 6.4 cm above the uli.    IMPRESSION:         Lung volumes are slightly low with patchy opacities now present in the   right lung base.  These could represent atelectatic changes.  Stable   endotracheal intubation.      Impression:          Electronically signed by Nan Shields MD on 11-21-22 at 0422    XR Abdomen KUB [[id number]] Collected: 11/21/22 0239     Updated: 11/21/22 0239    Narrative:        Patient: PASCUAL LAUREN  Time Out: 02:38  Exam(s): FILM ABDOMEN     EXAM:    XR Abdomen, 2 Views    CLINICAL HISTORY:     Reason for exam: coretrack placement.    TECHNIQUE:    Frontal view of the abdomen pelvis with upright view of the abdomen.    COMPARISON:    Abdomen and pelvis CT of 11 20 2022 at 1329 hrs.    FINDINGS:    Intraperitoneal space:  No free air.    Gastrointestinal tract:  Unremarkable.  No dilation.    Bones joints:  Unremarkable.    Tubes, lines and devices:  Interval placement of an enteric feeding   tube with stylet still in place.  The tip projects over the mid to distal   gastric body.    IMPRESSION:         Interval placement of an enteric feeding tube with stylet still in   place.  The tip projects over the mid to distal gastric body.      Impression:          Electronically signed by Nan Shields MD on 11-21-22 at 0238    CT Angiogram Chest [[id number]] Collected: 11/20/22 1421     Updated: 11/20/22 1427    Narrative:      CT ANGIOGRAM CHEST WITH IV CONTRAST, CT ABDOMEN AND PELVIS WITH IV  CONTRAST     HISTORY: Shortness of air. Evaluate for pulmonary embolus.     TECHNIQUE: CT angiogram chest  includes axial imaging from the thoracic  inlet to the upper abdomen with IV contrast and data reconstructed in  coronal and sagittal planes and 3-dimensional volume rendering was  performed. CT abdomen and pelvis includes axial imaging through the  abdomen and pelvis with IV contrast. As part of the technique for this  CT exam, radiation dose reduction techniques were utilized, including  automated exposure control and exposure modulation based on body size.     COMPARISON: CT pelvis 05/25/2022. No previous chest CT chest or abdomen  for comparison.     FINDINGS:     CHEST: Exam is positive for pulmonary thromboembolic disease. The most  central embolus is present within the right interlobar pulmonary artery  distally just distal to the origin of the anterior basal segment right  lower lobe. Right lower lobe emboli extend into posterior and medial  basal segments of the right lower lobe. There are peripheral emboli  within right middle lobe subsegmental branches. There is an embolus  within the anterior segment left upper lobe pulmonary artery.     There are also bilateral upper and lower lobe pulmonary nodular  opacities. The largest pulmonary nodules are present within both lower  lobes and irregular nodule in the right lower lobe on image 118 that  measures approximately 18 mm. These exhibit surrounding groundglass  opacity supporting that these are inflammatory or infectious in etiology  though these need follow-up to resolution. There is central right lower  lobe bronchial wall thickening and there is mild infiltrative soft  tissue density within the right hilar and infrahilar region consistent  with mild node enlargement and these may be reactive. A right  pretracheal lymph node measures 1.1 cm which is mildly enlarged. Left  prevascular space node measures 0.7 cm. Endotracheal tube is present.  There is no axillary vashti enlargement.     The ascending thoracic aorta measures 4.1 cm which is enlarged.  Aortic  vascular calcifications are present. There is no evidence for  dissection. Multilevel bridging endplate spur formation is present  throughout the thoracic spine. Small right pleural effusion is present  along the right posterior costophrenic angle.      ABDOMEN/PELVIS: Liver, gallbladder, spleen, adrenal glands, pancreas,  kidneys appear within normal limits allowing for some limitation due to  motion related artifact.     There is a greater amount of stool throughout the colon than typically  seen. Volume of stool is greatest within the rectosigmoid colon and the  rectum is dilated with stool measuring 7 cm transverse with moderate  wall thickening suggesting proctitis and constipation/fecal impaction.  No vashti enlargement is demonstrated within the abdomen or pelvis.     Atherosclerotic calcifications are present involving the abdominal aorta  and iliac vasculature. There is prostate gland enlargement. Three pins  transfix the right proximal femur. There are mild osteoarthritic changes  of both hips. There is partial bony fusion of the sacroiliac joints. Old  healed superior and inferior pubic rami fractures are present. There is  multilevel degenerative disc disease and facet arthritis in the lumbar  spine.       Impression:      1. Exam is positive for pulmonary thromboembolic disease with the most  central embolus within the distal right interlobar artery. No CT  evidence for right heart strain.  2. Multilobar pulmonary nodules with the largest nodules in both lower  lobes and these appear to be inflammatory or infectious in etiology  though recommend follow-up to resolution. There is central right lower  lobe bronchial wall thickening with mild right hilar and mediastinal  vashti enlargement, potentially reactive though recommend follow-up.  3. Enlargement of the ascending thoracic aorta measuring 4.1 cm in  diameter. Atherosclerotic disease with mild coronary arterial  calcifications.  4.. Greater  amount of stool throughout the colon than typically seen  with stool distention of the rectosigmoid colon and suspected fecal  impaction with mild proctitis.     Discussed with Dr. Dill in the emergency department on 11/20/2022 at 2  PM.     This report was finalized on 11/20/2022 2:24 PM by Dr. Carloz Cabral M.D.       CT Abdomen Pelvis With Contrast [[id number]] Collected: 11/20/22 1421     Updated: 11/20/22 1427    Narrative:      CT ANGIOGRAM CHEST WITH IV CONTRAST, CT ABDOMEN AND PELVIS WITH IV  CONTRAST     HISTORY: Shortness of air. Evaluate for pulmonary embolus.     TECHNIQUE: CT angiogram chest includes axial imaging from the thoracic  inlet to the upper abdomen with IV contrast and data reconstructed in  coronal and sagittal planes and 3-dimensional volume rendering was  performed. CT abdomen and pelvis includes axial imaging through the  abdomen and pelvis with IV contrast. As part of the technique for this  CT exam, radiation dose reduction techniques were utilized, including  automated exposure control and exposure modulation based on body size.     COMPARISON: CT pelvis 05/25/2022. No previous chest CT chest or abdomen  for comparison.     FINDINGS:     CHEST: Exam is positive for pulmonary thromboembolic disease. The most  central embolus is present within the right interlobar pulmonary artery  distally just distal to the origin of the anterior basal segment right  lower lobe. Right lower lobe emboli extend into posterior and medial  basal segments of the right lower lobe. There are peripheral emboli  within right middle lobe subsegmental branches. There is an embolus  within the anterior segment left upper lobe pulmonary artery.     There are also bilateral upper and lower lobe pulmonary nodular  opacities. The largest pulmonary nodules are present within both lower  lobes and irregular nodule in the right lower lobe on image 118 that  measures approximately 18 mm. These exhibit surrounding  groundglass  opacity supporting that these are inflammatory or infectious in etiology  though these need follow-up to resolution. There is central right lower  lobe bronchial wall thickening and there is mild infiltrative soft  tissue density within the right hilar and infrahilar region consistent  with mild node enlargement and these may be reactive. A right  pretracheal lymph node measures 1.1 cm which is mildly enlarged. Left  prevascular space node measures 0.7 cm. Endotracheal tube is present.  There is no axillary vashti enlargement.     The ascending thoracic aorta measures 4.1 cm which is enlarged. Aortic  vascular calcifications are present. There is no evidence for  dissection. Multilevel bridging endplate spur formation is present  throughout the thoracic spine. Small right pleural effusion is present  along the right posterior costophrenic angle.      ABDOMEN/PELVIS: Liver, gallbladder, spleen, adrenal glands, pancreas,  kidneys appear within normal limits allowing for some limitation due to  motion related artifact.     There is a greater amount of stool throughout the colon than typically  seen. Volume of stool is greatest within the rectosigmoid colon and the  rectum is dilated with stool measuring 7 cm transverse with moderate  wall thickening suggesting proctitis and constipation/fecal impaction.  No vashti enlargement is demonstrated within the abdomen or pelvis.     Atherosclerotic calcifications are present involving the abdominal aorta  and iliac vasculature. There is prostate gland enlargement. Three pins  transfix the right proximal femur. There are mild osteoarthritic changes  of both hips. There is partial bony fusion of the sacroiliac joints. Old  healed superior and inferior pubic rami fractures are present. There is  multilevel degenerative disc disease and facet arthritis in the lumbar  spine.       Impression:      1. Exam is positive for pulmonary thromboembolic disease with the  most  central embolus within the distal right interlobar artery. No CT  evidence for right heart strain.  2. Multilobar pulmonary nodules with the largest nodules in both lower  lobes and these appear to be inflammatory or infectious in etiology  though recommend follow-up to resolution. There is central right lower  lobe bronchial wall thickening with mild right hilar and mediastinal  vashti enlargement, potentially reactive though recommend follow-up.  3. Enlargement of the ascending thoracic aorta measuring 4.1 cm in  diameter. Atherosclerotic disease with mild coronary arterial  calcifications.  4.. Greater amount of stool throughout the colon than typically seen  with stool distention of the rectosigmoid colon and suspected fecal  impaction with mild proctitis.     Discussed with Dr. Dill in the emergency department on 11/20/2022 at 2  PM.     This report was finalized on 11/20/2022 2:24 PM by Dr. Carloz Cabral M.D.       CT Head Without Contrast [[id number]] Collected: 11/20/22 1201     Updated: 11/20/22 1209    Narrative:      CT HEAD WO CONTRAST-     INDICATIONS: Altered mental status     TECHNIQUE: Radiation dose reduction techniques were utilized, including  automated exposure control and exposure modulation based on body size.  Noncontrast head CT     COMPARISON: 05/25/2022     FINDINGS:           No acute intracranial hemorrhage, midline shift or mass effect. No acute  territorial infarct is identified.     Prominent periventricular hypodensities suggest chronic small vessel  ischemic change in a patient this age.      Ventricles, cisterns, cerebral sulci are stable.     The visualized paranasal sinuses, orbits, mastoid air cells are  unremarkable.          Impression:         No acute intracranial hemorrhage or hydrocephalus. Chronic changes of  the brain. If there is further clinical concern, MRI could be considered  for further evaluation.     This report was finalized on 11/20/2022 12:05 PM by   Tucker Santacruz M.D.       XR Chest 1 View [[id number]] Collected: 11/20/22 1052     Updated: 11/20/22 1059    Narrative:      XR CHEST 1 VW-     HISTORY: Male who is 77 years-old,  endotracheal intubation     TECHNIQUE: Frontal views of the chest     COMPARISON: 1/29/2021     FINDINGS: Endotracheal tube tip is 4.6 cm above the uli. The heart  size is normal. Aorta is calcified. Pulmonary vasculature is  unremarkable. Patchy opacity at the right mid to lower lung may  represent developing pneumonia, follow-up recommended. A nodular density  projecting at the right base between the right posterior 10th and 11th  ribs may be nipple shadow or pulmonary nodule, frontal view with nipple  markers can be obtained to exclude possibility of pulmonary nodule. No  pleural effusion, or pneumothorax. No acute osseous process.       Impression:      Patchy opacity at the right mid to lower lung may be  developing pneumonia, follow-up recommended. Possible nodule at the  right lung base, as described.     This report was finalized on 11/20/2022 10:56 AM by Dr. Tucker Santacruz M.D.             Results for orders placed during the hospital encounter of 11/20/22    Adult Transthoracic Echo Complete w/ Color, Spectral and Contrast if Necessary Per Protocol    Interpretation Summary  •  Left ventricular systolic function is normal. Calculated left ventricular EF = 68% Normal left ventricular cavity size and wall thickness noted. All left ventricular wall segments contract normally. Left ventricular diastolic function is consistent with (grade I) impaired relaxation  •  The right ventricular cavity is moderate to severely dilated. Normal right ventricular wall thickness noted. Mildly reduced right ventricular systolic function noted.  •  Trace tricuspid valve regurgitation is present. Insufficient TR velocity profile to estimate the right ventricular systolic pressure.  •  There is a trivial pericardial effusion.    Pertinent  Labs     Results from last 7 days   Lab Units 12/02/22  1348 12/01/22  0730 11/30/22  0738 11/29/22  0704   WBC 10*3/mm3 21.66* 18.50* 12.00* 10.70   HEMOGLOBIN g/dL 7.1* 9.5* 10.6* 10.5*   PLATELETS 10*3/mm3 358 460* 408 391     Results from last 7 days   Lab Units 12/01/22  0730 11/30/22  0738 11/29/22  0704   SODIUM mmol/L 135* 136 138   POTASSIUM mmol/L 4.6 4.5 4.1   CHLORIDE mmol/L 100 103 102   CO2 mmol/L 28.3 26.8 28.9   BUN mg/dL 21 15 16   CREATININE mg/dL 0.59* 0.58* 0.60*   GLUCOSE mg/dL 136* 120* 107*   EGFR mL/min/1.73 98.7 99.2 98.2     Results from last 7 days   Lab Units 12/01/22  0730 11/30/22  0738 11/29/22  0704   ALBUMIN g/dL 2.60* 2.40* 2.40*     Results from last 7 days   Lab Units 12/01/22  0730 11/30/22  0738 11/29/22  0704   CALCIUM mg/dL 8.1* 8.3* 8.6   ALBUMIN g/dL 2.60* 2.40* 2.40*   MAGNESIUM mg/dL  --   --  2.2   PHOSPHORUS mg/dL 3.3 3.5 3.2               Invalid input(s): LDLCALC          Test Results Pending at Discharge       Discharge Details        Discharge Medications      ASK your doctor about these medications      Instructions Start Date   acetaminophen 325 MG tablet  Commonly known as: TYLENOL   650 mg, Oral, Every 6 Hours PRN      citalopram 10 MG tablet  Commonly known as: CeleXA   10 mg, Oral, Nightly      Divalproex Sodium 125 MG capsule  Commonly known as: DEPAKOTE SPRINKLE  Ask about: Which instructions should I use?   125 mg, Oral, 3 Times Daily      donepezil 5 MG tablet  Commonly known as: ARICEPT   10 mg, Oral, Nightly      LORazepam 0.5 MG tablet  Commonly known as: ATIVAN   0.5 mg, Oral, 2 Times Daily      memantine 10 MG tablet  Commonly known as: NAMENDA   10 mg, Oral, 2 Times Daily      neomycin-bacitracin-polymyxin 5-400-5000 ointment   1 application, Topical, Daily      omeprazole 20 MG capsule  Commonly known as: priLOSEC   20 mg, Oral, 2 Times Daily      pantoprazole 40 MG EC tablet  Commonly known as: PROTONIX   40 mg, Oral, Daily      promethazine 12.5 MG  tablet  Commonly known as: PHENERGAN   12.5 mg, Oral, Every 6 Hours PRN      rivaroxaban 20 MG tablet  Commonly known as: XARELTO   20 mg, Oral, Daily      THERAHONEY EX   1 application, Apply externally      traMADol 50 MG tablet  Commonly known as: ULTRAM   50 mg, Oral, 2 Times Daily      traZODone 50 MG tablet  Commonly known as: DESYREL   50 mg, Oral, 2 Times Daily             Allergies   Allergen Reactions   • Codeine Other (See Comments)     unknown   • Penicillins Other (See Comments)     unknown       Discharge Disposition:  Hospice/Medical Facility ([facility])      Discharge Diet:  No active diet order      Discharge Activity:       CODE STATUS:    Code Status and Medical Interventions:   Ordered at: 12/01/22 2302     Level Of Support Discussed With:    Health Care Surrogate     Code Status (Patient has no pulse and is not breathing):    No CPR (Do Not Attempt to Resuscitate)     Medical Interventions (Patient has pulse or is breathing):    Comfort Measures     Release to patient:    Routine Release       No future appointments.   Contact information for follow-up providers     Joel Villarreal MD .    Specialty: Internal Medicine  Contact information:  [address] [id number] [phone]                   Contact information for after-discharge care     Destination     [facility] .    Service: Inpatient Hospice  Contact information:  [address]  [facility] [address]  [phone]                             Time Spent on Discharge:  Greater than 30 minutes      Theodore Esteves MD  [facility]  12/05/22  16:08 EST

## 2022-12-05 NOTE — H&P
Patient Name:  Harsha Mejia  YOB: 1945  MRN:  [id number]  Admit Date:  12/5/2022  Patient Care Team:  Joel Villarreal MD as PCP - General (Internal Medicine)      Subjective   History Present Illness     Chief complaint: Admission for palliative care    Mr. Mejia is a 77 y.o. male nursing home resident with a history of dementia, DVT and diastolic CHF who presented to [facility] initially 11/20 complaining of copious secretions and difficulty breathing.  See the discharge summary for details of his acute hospital stay.  He was transferred to hospice scatter bed status today for full comfort measures.  Per staff he has been moaning some with repositioning but otherwise has not displayed any discomfort.      Review of Systems   Unable to perform ROS: Patient unresponsive        Personal History     Past Medical History:   Diagnosis Date   • Anemia    • Anxiety    • Arthritis     osteoarthritis   • Bradycardia    • CHF (congestive heart failure) ([facility])    • COPD (chronic obstructive pulmonary disease) ([facility])    • Dementia ([facility])    • Depression    • DVT (deep venous thrombosis) ([facility])    • GERD (gastroesophageal reflux disease)    • Hyperlipidemia    • Peripheral vascular disease ([facility])    • Vasovagal syncope     history of      Past Surgical History:   Procedure Laterality Date   • HIP PERCUTANEOUS PINNING Right 8/7/2018    Procedure: CLOSED REDUCTION AND PINNING RIGHT HIP FRACTURE;  Surgeon: Saqib Castle Jr., MD;  Location: [facility];  Service: Orthopedics     No family history on file.  Social History     Tobacco Use   • Smoking status: Former   • Smokeless tobacco: Never   Substance Use Topics   • Alcohol use: No   • Drug use: No     Current Facility-Administered Medications on File Prior to Encounter   Medication Dose Route Frequency Provider Last Rate Last Admin   • [DISCONTINUED] acetaminophen (TYLENOL) 160 MG/5ML solution 650 mg  650 mg Oral Q4H PRN Helen Miller,  APRN       • [DISCONTINUED] acetaminophen (TYLENOL) 160 MG/5ML solution 650 mg  650 mg Oral Q4H PRN Noble Bauer MD       • [DISCONTINUED] acetaminophen (TYLENOL) suppository 650 mg  650 mg Rectal Q4H PRN Noble Bauer MD       • [DISCONTINUED] acetaminophen (TYLENOL) tablet 650 mg  650 mg Oral Q4H PRN Noble Bauer MD       • [DISCONTINUED] atropine 1 % ophthalmic solution 2 drop  2 drop Sublingual BID PRN Noble Bauer MD       • [DISCONTINUED] diphenoxylate-atropine (LOMOTIL) 2.5-0.025 MG per tablet 1 tablet  1 tablet Oral Q2H PRN Noble Bauer MD       • [DISCONTINUED] Glycerin-Hypromellose- (ARTIFICIAL TEARS) 0.2-0.2-1 % ophthalmic solution solution 1 drop  1 drop Both Eyes Q30 Min PRN Noble Bauer MD       • [DISCONTINUED] glycopyrrolate (ROBINUL) injection 0.2 mg  0.2 mg Intravenous Q2H PRN Noble Bauer MD   0.2 mg at 12/04/22 1802   • [DISCONTINUED] glycopyrrolate (ROBINUL) injection 0.2 mg  0.2 mg Subcutaneous Q2H PRN Noble Bauer MD       • [DISCONTINUED] glycopyrrolate (ROBINUL) injection 0.4 mg  0.4 mg Intravenous Q2H PRN Noble Bauer MD   0.4 mg at 12/05/22 1309   • [DISCONTINUED] glycopyrrolate (ROBINUL) injection 0.4 mg  0.4 mg Subcutaneous Q2H PRN Noble Bauer MD       • [DISCONTINUED] HYDROmorphone (DILAUDID) injection 1 mg  1 mg Intravenous Q1H PRNoble Gupta MD   1 mg at 12/05/22 1309   • [DISCONTINUED] HYDROmorphone (DILAUDID) injection 1.5 mg  1.5 mg Intravenous Q1H PRNoble Gupta MD   1.5 mg at 12/05/22 1415   • [DISCONTINUED] LORazepam (ATIVAN) 2 MG/ML concentrated solution 0.5 mg  0.5 mg Sublingual Q1H PRN Noble Bauer MD       • [DISCONTINUED] LORazepam (ATIVAN) 2 MG/ML concentrated solution 1 mg  1 mg Sublingual Q1H PRN Noble Bauer MD       • [DISCONTINUED] LORazepam (ATIVAN) 2 MG/ML concentrated solution 2 mg  2 mg Sublingual Q1H PRN Noble Bauer MD       • [DISCONTINUED] LORazepam (ATIVAN) injection 0.5 mg  0.5 mg Intravenous Q1H PRN Noble Bauer MD        • [DISCONTINUED] LORazepam (ATIVAN) injection 0.5 mg  0.5 mg Subcutaneous Q1H PRN Noble Bauer MD       • [DISCONTINUED] LORazepam (ATIVAN) injection 1 mg  1 mg Intravenous Q1H PRNoble Gupta MD   1 mg at 12/04/22 1802   • [DISCONTINUED] LORazepam (ATIVAN) injection 1 mg  1 mg Subcutaneous Q1H Noble Alfaro MD       • [DISCONTINUED] LORazepam (ATIVAN) injection 2 mg  2 mg Intravenous Q1H PRN Noble Bauer MD   2 mg at 12/05/22 1415   • [DISCONTINUED] LORazepam (ATIVAN) injection 2 mg  2 mg Subcutaneous Q1H PRNoble Gupta MD       • [DISCONTINUED] morphine concentrated solution 10 mg  10 mg Sublingual Q1H PRN Noble Bauer MD       • [DISCONTINUED] morphine concentrated solution 20 mg  20 mg Sublingual Q1H PRN Noble Bauer MD       • [DISCONTINUED] Morphine injection 6 mg  6 mg Intravenous Q1H PRN Noble Bauer MD       • [DISCONTINUED] Morphine sulfate (PF) injection 4 mg  4 mg Intravenous Q1H PRN Noble Bauer MD   4 mg at 12/05/22 0017   • [DISCONTINUED] promethazine (PHENERGAN) 6.25 MG/5ML syrup 12.5 mg  12.5 mg Oral Q4H PRN Noble Bauer MD       • [DISCONTINUED] promethazine (PHENERGAN) suppository 12.5 mg  12.5 mg Rectal Q4H PRN Noble Bauer MD       • [DISCONTINUED] promethazine (PHENERGAN) tablet 12.5 mg  12.5 mg Oral Q4H PRN Noble Bauer MD       • [DISCONTINUED] scopolamine patch 1 mg/72 hr  1 patch Transdermal Q72H PRNoble Gupta MD       • [DISCONTINUED] sodium chloride 0.9 % flush 10 mL  10 mL Intravenous PRN Abdias Barber MD   10 mL at 12/04/22 0428     Current Outpatient Medications on File Prior to Encounter   Medication Sig Dispense Refill   • acetaminophen (TYLENOL) 325 MG tablet Take 650 mg by mouth Every 6 (Six) Hours As Needed for Mild Pain.     • citalopram (CeleXA) 10 MG tablet Take 1 tablet by mouth Every Night for 30 days. (Patient taking differently: Take 10 mg by mouth Every Other Day.) 30 tablet 0   • Divalproex Sodium (DEPAKOTE SPRINKLE) 125 MG  capsule Take 125 mg by mouth 3 (Three) Times a Day.     • donepezil (ARICEPT) 5 MG tablet Take 10 mg by mouth Every Night.     • LORazepam (ATIVAN) 0.5 MG tablet Take 0.5 mg by mouth 2 (Two) Times a Day.     • memantine (NAMENDA) 10 MG tablet Take 10 mg by mouth 2 (Two) Times a Day.     • neomycin-bacitracin-polymyxin (NEOSPORIN) 5-400-5000 ointment Apply 1 application topically to the appropriate area as directed Daily.     • omeprazole (priLOSEC) 20 MG capsule Take 20 mg by mouth 2 (Two) Times a Day.     • pantoprazole (PROTONIX) 40 MG EC tablet Take 40 mg by mouth Daily.     • promethazine (PHENERGAN) 12.5 MG tablet Take 12.5 mg by mouth Every 6 (Six) Hours As Needed for Nausea or Vomiting.     • rivaroxaban (XARELTO) 20 MG tablet Take 20 mg by mouth Daily.     • traMADol (ULTRAM) 50 MG tablet Take 50 mg by mouth 2 (Two) Times a Day.     • traZODone (DESYREL) 50 MG tablet Take 50 mg by mouth 2 (Two) Times a Day.     • Wound Dressings (THERAHONEY EX) Apply 1 application topically.       Allergies   Allergen Reactions   • Codeine Other (See Comments)     unknown   • Penicillins Other (See Comments)     unknown       Objective    Objective     Vital Signs  Temp:  [100.3 °F (37.9 °C)-101.6 °F (38.7 °C)] 100.3 °F (37.9 °C)  Heart Rate:  [100-101] 101  Resp:  [16-20] 16  BP: ()/(53-64) 98/53  SpO2:  [63 %-70 %] 70 %  on   ;   Device (Oxygen Therapy): room air  There is no height or weight on file to calculate BMI.    Physical Exam  Vitals reviewed.   Constitutional:       General: He is not in acute distress.     Appearance: He is ill-appearing.   Cardiovascular:      Rate and Rhythm: Regular rhythm. Tachycardia present.      Heart sounds: Normal heart sounds.   Pulmonary:      Effort: No respiratory distress.      Breath sounds: Rhonchi and rales present. No wheezing.   Abdominal:      General: There is no distension.      Palpations: Abdomen is soft.   Musculoskeletal:      Right lower leg: No edema.      Left  lower leg: No edema.   Skin:     General: Skin is warm and dry.   Neurological:      Mental Status: He is unresponsive.         Results Review:    Lab Results (last 24 hours)     ** No results found for the last 24 hours. **          Imaging Results (Last 24 Hours)     ** No results found for the last 24 hours. **          Results for orders placed during the hospital encounter of 11/20/22    Adult Transthoracic Echo Complete w/ Color, Spectral and Contrast if Necessary Per Protocol    Interpretation Summary  •  Left ventricular systolic function is normal. Calculated left ventricular EF = 68% Normal left ventricular cavity size and wall thickness noted. All left ventricular wall segments contract normally. Left ventricular diastolic function is consistent with (grade I) impaired relaxation  •  The right ventricular cavity is moderate to severely dilated. Normal right ventricular wall thickness noted. Mildly reduced right ventricular systolic function noted.  •  Trace tricuspid valve regurgitation is present. Insufficient TR velocity profile to estimate the right ventricular systolic pressure.  •  There is a trivial pericardial effusion.      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     Mr. Mejia is a 77 y.o. male nursing home resident with a history of dementia, DVT and diastolic CHF who presented to [facility]  [place] initially  complaining of copious secretions and difficulty breathing, eventually treated for respiratory failure, aspiration pneumonia, PE before being transitioned to comfort measures only.    Remains appropriate for full comfort measures.  Anticipate that he will  in the next few hours to days.  He is receiving Robinul regularly as well as Dilaudid, morphine and lorazepam.    Hospice will follow.  We will transfer care to Dr. Ahmadi tomorrow for the remainder of his stay.       Theodore Esteves MD  [place] [facility]  22  16:14 EST

## 2022-12-05 NOTE — PLAN OF CARE
Goal Outcome Evaluation:  Plan of Care Reviewed With: patient        Progress: declining  Outcome Evaluation: PPS 10%, pt minimally responsive, nonverbal. Grimace and moaning at times with cares. Premedicated with dilaudid, ativan and robinul. Diluadid increased to 1.5mg with good relief. New IV placed. Congestion well controlled. Pt admitted to hospice today, now hospice scattered bed. Pt currently appears calm and comfortable.

## 2022-12-06 NOTE — PLAN OF CARE
Goal Outcome Evaluation:           Progress: declining  Outcome Evaluation: Pt responsive to pain-- premedicate prior to turns. Congestion continues.  New IV placed. No family visited at bedside, will cont to monitor

## 2022-12-06 NOTE — PLAN OF CARE
Goal Outcome Evaluation:  Plan of Care Reviewed With: patient        Progress: no change  Outcome Evaluation: Patient given 1.5mg IV Dilaudid, 2mg IV Ativan and 0.4mg IV Robinul before repositioning and patient has appeared comfortable with turns, still minimally responsive to pain. Patient with some congestion, too deep to be cleared with yaunker suctioning, scopolamine patch applied. No visitors overnight, will continue comfort measures.

## 2022-12-06 NOTE — PROGRESS NOTES
[facility] scattered bed team SW met with patient to explain role of SB team SW and assess for needs. No family at the bedside. SW called patient's guardian and son Abdias to offer support. Patient was lying in his hospital bed, unresponsive with no signs of pain or discomfort. Discussed goals of care and family has made the decision to focus on comfort care only.    Discussed  planning and family has made arrangements with [facility] in [place].    Disposition- family would like for patient to remain in a SB for EOL care. If patient were to stabilize and need to be discharged, patient would need to return  to his LTC facility with hospice to follow.    SW will visit on a weekly and PRN basis to offer EOL support and assist with needs.    Jarret Palomo, FREYAW  [facility]

## 2022-12-06 NOTE — PROGRESS NOTES
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             Patient: Harsha Mejia        : 1945  Account: [id number]           Admit Date: 2022        How to Respond to this query:       a. Click New Note     b. Answer query within the yellow box.                c. Update the Problem List, if applicable.      If you have any questions about this query contact me at:  Sincerely,    Bubba Juarez RN, BSN  Clinical Documentation Integrity  [facility]  [email or web address]      Dr. Esteves,     77 year old male presented in respiratory distress from a nursing home which required intubation.  Patient is also noted to have “End stage dementia, history of severe dementia” per the progress notes with baseline confusion.  Nursing documentation  includes “very restless, Fentanyl given multiple times…” Per note , “Patient continues with agitation and combativeness requiring restraints, disoriented x4, pulling at lines and oxygen.”  Patient was also given Haldol, Precedex with mechanical ventilation,  and was made comfort care at end of stay.  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 Please utilize your independent clinical judgment when addressing the question(s) above.     This query and your response, once completed, will be entered into the legal medical record.

## 2022-12-06 NOTE — PROGRESS NOTES
Enter Query Response Below      Query Response:     Moderate protein calorie malnutrition.  Patient had evidence of loss of muscle mass and subcutaneous fat on physical exam.         If applicable, please update the problem list.             Patient: Harsha Mejia        : 1945  Account: [id number]           Admit Date: 2022        How to Respond to this query:       a. Click New Note     b. Answer query within the yellow box.                c. Update the Problem List, if applicable.      If you have any questions about this query contact me at:  Sincerely,    Bubba Juarez RN, BSN  Clinical Documentation Integrity  [facility]  [email or web address]       Dr. Esteves,     77 year old male with history of dementia presented with respiratory failure requiring intubation, aspiration pneumonia.  Progress notes and discharge summary state “Hypoalbuminemia due to protein-calorie malnutrition.” Albumin level was 3.00, total protein 5.5 ().  Patient was seen by dietician who reported BMI 21.96, “9lb wt loss x 1 year” and tube feedings were started.  H/P also describes patient to be “lean,” no edema noted per physical exam.  Aspen criteria for malnutrition requires presence of at least 2 of the following: insufficient energy intake, weight loss, loss of muscle mass, loss of subcutaneous fat, fluid accumulation.  After study, was malnutrition clinically supported?    *Yes, _________ malnutrition supported with additional indicators:_______(please also specify degree of malnutrition exhibited).   *Malnutrition not supported; hypoalbuminemia only  *Other__________  *Clinically undetermined      By submitting this query, we are merely seeking further clarification of documentation to accurately reflect all conditions that you are monitoring, evaluating, treating or that extend the hospitalization or utilize additional resources of care. Please utilize your independent clinical judgment when  addressing the question(s) above.     This query and your response, once completed, will be entered into the legal medical record.

## 2022-12-06 NOTE — H&P
Palliative Care/Hospice Admit/Consult Note     Referring Provider: Calin Pablo MD   Reason for Consultation: Palliative/hospice care  Date of Admission:  12/5/2022    Patient Care Team:  Joel Villarreal MD as PCP - General (Internal Medicine)    Chief complaint: Senile degeneration of brain/dementia    History of present illness:  The patient is a 77 y.o. male who presented to the ED 11/20/2022 due to gurgling after eating.  Upon EMS arrival, O2 saturation 50% range.  The patient was intubated in the emergency department.  Patient was admitted to the intensive care unit.  Infectious diseases did evaluate the patient and strep bacteremia as well as coag negative staph bacteremia identified and classified as likely contamination.  Antibiotics adjusted for aspiration pneumonia.  The patient was transferred out of the intensive care unit.  However, 11/26/2022 required readmission to the intensive care unit.  With care provided, the patient has demonstrated some improvement.  However, he has continued to require feeding tube.  Previously, the patient was a DNR and CCP working with legal guardian.  I was asked to evaluate the patient for second opinion concerning DNR/comfort care measures.  Subsequently, appropriately, the guardian allowed DNR and hospice/comfort care.  I have been asked to assume the patient's care since he is admitted as a hospice scattered bed patient.    At the time of my evaluation, the patient was getting a bath and a shave.  The patient was not awake.  The patient had slight moaning respirations during movement.  The patient had some audible rhonchi.  No ROS was obtainable from the patient.    Review of Systems  Review of systems could not be obtained due to   patient nonverbal.  Additionally, the patient has been medicated/sedated and not responsive.    Palliative Performance Scale  Palliative Performance Scale Score: 10%  [place] Symptom Assessment System Revised  Pain Score: no pain    ESAS Tiredness Score: Worst possible tiredness  ESAS Nausea Score: No nausea  ESAS Depression Score: unable to assess  ESAS Anxiety Score: No anxiety  ESAS Drowsiness Score: Worst possible drowsiness  ESAS Lack of Appetite Score: Worst lack of appetite  ESAS Wellbeing Score: Best wellbeing  ESAS Dyspnea Score: No shortness of breath  ESAS Other Problem Score: unable to assess  ESAS Source of Information: healthcare professional caregiver  ESAS Intervention: medicated/see MAR  ESAS Intervention Response: tolerated    History  Past Medical History:   Diagnosis Date   • Anemia    • Anxiety    • Arthritis     osteoarthritis   • Bradycardia    • CHF (congestive heart failure) (HCC)    • COPD (chronic obstructive pulmonary disease) (HCC)    • Dementia (HCC)    • Depression    • DVT (deep venous thrombosis) (HCC)    • GERD (gastroesophageal reflux disease)    • Hyperlipidemia    • Peripheral vascular disease ([facility])    • Vasovagal syncope     history of    ,   Past Surgical History:   Procedure Laterality Date   • HIP PERCUTANEOUS PINNING Right 8/7/2018    Procedure: CLOSED REDUCTION AND PINNING RIGHT HIP FRACTURE;  Surgeon: Saqib Catsle Jr., MD;  Location: [facility];  Service: Orthopedics     Unable to obtain family history due to the patient's dementia    Social History     Socioeconomic History   • Marital status:    Tobacco Use   • Smoking status: Former   • Smokeless tobacco: Never   Substance and Sexual Activity   • Alcohol use: No   • Drug use: No   • Sexual activity: Defer     E-cigarette/Vaping     E-cigarette/Vaping Substances     E-cigarette/Vaping Devices        Allergy Codeine and Penicillins    Vital Signs   Temp:  [97.9 °F (36.6 °C)-98.6 °F (37 °C)] 97.9 °F (36.6 °C)  Heart Rate:  [50-86] 50  Resp:  [14] 14  BP: (87-88)/(44-50) 88/50  Device (Oxygen Therapy): room air SpO2:  [65 %-73 %] 65 %    Physical Exam:  General Appearance:   Not awake appears in no acute distress lying on his back with head  of bed elevated 10-20 degrees, chronically ill-appearing elderly male   Head:    Normocephalic, without obvious abnormality, atraumatic   Eyes:            Lids and lashes normal, conjunctivae and sclerae normal, no icterus   Ears:    Ears appear intact with no abnormalities noted   Throat:   No oral lesions, oral mucosa moist   Neck:   No adenopathy, supple, trachea midline, no thyromegaly   Back:     No scoliosis present   Lungs:     Clear to auscultation with occasional scattered inspiratory crackle and mild-moderate expiratory rhonchi, respirations with slightly increased inspiratory effort and rate     Heart:    Irregular rhythm and normal rate       Abdomen:   Occasional bowel sounds, soft and non-tender, non-distended   Genitalia:    Deferred   Extremities:  No edema, muscle atrophy noted in the upper and lower extremities as well as loss of subcutaneous fat, no cyanosis    Pulses:  Radial pulses palpable and equal bilaterally   Skin:   No bleeding, wound images on admission reviewed         Neurologic:  Not awake to test      Results Review:   I reviewed the patient's new clinical results.    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  I reviewed with the RN.  I reviewed with CCP.  I examined the patient.  At the time of my evaluation, with medications previously administered, the patient appears comfortable.  All routine scheduled medications previously discontinued.  The patient has required  glycopyrrolate for airway congestion.  The patient has required 4 doses of 1.5 mg Dilaudid, 7 doses yesterday, and 4 doses 2 mg Ativan, 7 doses yesterday, thus far today.  Medications will be continued and adjusted as needed for symptom management for comfort.  No attempts at resuscitation will be made.    Will Ahmadi MD  Hospice and Palliative Medicine  12/06/22  18:55 EST

## 2022-12-06 NOTE — PROGRESS NOTES
[facility] Visit Report    Harsha Mejia  [id number]  12/6/2022    Admission R/T [facility] Dx: Yes    Reason for [facility] Admission: Senile degeneration of brain    Review of Visit: Patient is a 78yo male with a primary [facility] diagnosis of senile degeneration of brain. Admitted to [facility] for GIP for EOL care and symptom management of pain, restlessness, dyspnea and congestion.     PPS: 10%, bed bound, oral care only, responsive to pain.     VS: 98.6, 86, 14, 87/44, 73% on room air.    Medications in 24 hours:  -IV Robinul 0.4mg x7  -IV Dilaudid 1mg x1  -IV Dilaudid 1.5mg x7  -IV Ativan 1mg x8  -Scopplamine patch    Recommendations:  Patient noted with facial grimacing and loud moaning during bath and repositioning. [facility] RN notified and already discussed increasing IV Dilaudid with [facility] MD. Continue to monitor for signs of decline and provide comfort measures. Contact [facility] at [phone] with any questions or concerns and at TOD.     Assessment:  Patient is bed bound, oral care only, responsive to pain, PPS 10%. Respirations labored at times with audible congestion, open mouth breathing and increased secretions today. Patient turned into recovery position during visit, currently on room air. Lung sounds with rhonchi throughout. Abdomen is soft with occasional bowel sounds. Color is pale, hands and feet cool to touch with edema. Lee catheter to bedside drainage with diminished yaniv urine output, 500mL documented in 24 hours.     Collaboration:  Call placed to pts son with no answer, brief voicemail left with [facility] phone number. Collaborated with [facility] RN and CCP about pts condition and recommendations.    Disposition:  Patient meets GIP criteria d/t frequent administration and continued titration of IV medications to achieve and maintain symptom management. Patient appears to be unsafe for transport at this time, requiring increasing symptom management and frequent RN assessment. If patient were to  stabilize he would likely require LTC placement d/t increased daily care needs. Will continue [facility] RN visits to monitor for changes, assess needs and provide support.       Mira Rankin RN  [facility] Visit Nurse  Scattered Bed Team

## 2022-12-06 NOTE — PROGRESS NOTES
Chp provided an initial spiritual assessment for pt who is unresponsive.  There were no friends or family at bedside.  I was able to contact his guardian who stated that she never heard the pt discuss his spirituality and was not aware of any affiliation even though she described him as a very happy man. Chp will be available 1x per week and prn.

## 2022-12-07 NOTE — PROGRESS NOTES
[facility] Visit Report    Harsha Mejia  [id number]  12/7/2022    Admission R/T [facility] Dx: Yes    Reason for [facility] Admission: Senile degeneration of brain     Review of Visit: Patient is a 76yo male with a primary [facility] diagnosis of senile degeneration of brain. Admitted to [facility] for [facility] for EOL care and symptom management of pain, restlessness, dyspnea and congestion.      PPS: 10%, bed bound, oral care only, responsive to pain.      VS: 97.5, 93, 12, 90/50, 83% on room air.      Medications in 24 hours:  -IV Robinul 0.4mg x6  -IV Dilaudid 1.5mg x2  -IV Dilaudid 2mg x4  -IV Ativan 2mg x6  -Scopolamine patch     Recommendations:  Patient noted with occasional facial grimacing during assessment, appeared comfortable at the end of the visit. Staff report that patient continues with moaning, facial grimacing and tense extremities with turns. Recommend to administer PRN dose of IV Dilaudid 2mg and IV Ativan 2mg during visit. Continue to monitor for signs of decline and provide comfort measures. Contact [facility] at [phone] with any questions or concerns and at TOD.      Assessment:  Patient is bed bound, oral care only, responsive to pain, PPS 10%. Respirations unlabored with occasionally audible congestion and open mouth breathing. Patient in left sided recovery position during visit, currently on room air. Lung sounds with occasional rhonchi. Abdomen is soft with occasional bowel sounds. Color is pale, hands and feet warm to touch with edema. Bilateral hands with mottling and cyanotic nailbeds. Lee catheter to bedside drainage with diminished yaniv urine output, 600mL documented in 24 hours.      Collaboration:  Spoke with pts son via phone with condition update and support provided. Training provided regarding assessment findings, disease progression, symptom management, recommendations and expected length of stay. Family v/u of pts condition and all questions were answered. Collaborated with [facility] RN  and CCP about pts condition and recommendations.     Disposition:  Patient meets GIP criteria d/t frequent administration and continued titration of IV medications to achieve and maintain symptom management. Patient appears to be unsafe for transport at this time, requiring increasing symptom management and frequent RN assessment. If patient were to stabilize he would likely require LTC placement d/t increased daily care needs. Will continue [facility] RN visits to monitor for changes, assess needs and provide support.       Mira Rankin RN  [facility] Visit Nurse  Scattered Bed Team

## 2022-12-07 NOTE — PROGRESS NOTES
Palliative Care/Hospice Follow Up Note       LOS: 2 days   Patient Care Team:  Joel Villarreal MD as PCP - General (Internal Medicine)    Chief Complaint:  Senile degeneration of brain/dementia    Interval History:     Patient Complaints: None  Patient Denies:  None  History taken from:  RN  Review of Systems:  As above.    Palliative Performance Scale  Palliative Performance Scale Score: 10%  [place] Symptom Assessment System Revised  Pain Score: no pain   ESAS Tiredness Score: Worst possible tiredness  ESAS Nausea Score: No nausea  ESAS Depression Score: unable to assess  ESAS Anxiety Score: No anxiety  ESAS Drowsiness Score: Worst possible drowsiness  ESAS Lack of Appetite Score: Worst lack of appetite  ESAS Wellbeing Score: Best wellbeing  ESAS Dyspnea Score: No shortness of breath  ESAS Other Problem Score: unable to assess  ESAS Source of Information: healthcare professional caregiver  ESAS Intervention: medicated/see MAR  ESAS Intervention Response: tolerated    Vital Signs  Temp:  [97.5 °F (36.4 °C)] 97.5 °F (36.4 °C)  Heart Rate:  [93] 93  Resp:  [10-12] 10  BP: (90-95)/(50-70) 95/70  Device (Oxygen Therapy): room air SpO2:  [83 %] 83 %    Physical Exam:  General Appearance:    Not awake and in no acute distress lying on slightly on his right side, chronically ill-appearing elderly male   Throat:   No oral lesions, oral mucosa somewhat moist   Neck:   No adenopathy, supple, trachea midline   Lungs:     Clear to auscultation, respirations diminished and not labored    Heart:    Irregular rhythm and normal rate   Abdomen:     Occasional bowel sounds, soft and non-tender, non-distended   Extremities:   No edema, muscle atrophy noted in the upper and lower extremities as well as loss of subcutaneous fat, cyanosis evident   Pulses:   Radial pulses palpable and equal bilaterally          Results Review:     I reviewed the patient's new clinical results.    Medication Reviewed.    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  I reviewed with the RN.  With medications previously administered, the patient appears comfortable.  The patient has required glycopyrrolate for airway congestion.  The patient has required 4 doses of 2 mg Dilaudid, 6 doses yesterday, and 4 doses of 2 mg Ativan, 6 doses yesterday, thus far today.  Medications will be continued and adjusted as needed for symptom management for comfort.  Gradual decline appears to be evident.    Plan for disposition:  DANIKA Ahmadi MD  Hospice and Palliative Medicine  12/07/22  18:34 EST

## 2022-12-07 NOTE — PLAN OF CARE
Goal Outcome Evaluation:  Plan of Care Reviewed With: patient        Progress: no change  Outcome Evaluation: Patient given 2mg IV Dilaudid, 2mg IV Ativan and 0.4mg IV Robinul before repositioning and patient is responsive to pain but appears comfortable after repositioning. Patient having congestion at times but able to clear with oral sucitioning. No visitors overnight, FC intact, will continue comfort measures.

## 2022-12-07 NOTE — PLAN OF CARE
Goal Outcome Evaluation:           Progress: declining  Outcome Evaluation: patient premedicated priro to turns with 2mg dilaudid, 2 mg ativan, 0.4 robinul. new IV placed and waiting for midline. turn side to side in recovery position. will continue to monitor for comfort.

## 2022-12-08 NOTE — SIGNIFICANT NOTE
12/08/22 0825   Midline Catheter - Single Lumen 12/08/22 Right Brachial   Placement Date/Time: 12/08/22 0824   Site Prep: Chlorhexidine  All 5 Sterile Barriers Used (Gloves, Gown, Cap, Mask, Large Sterile Drape): Yes  Orientation: Right  Location: Brachial  Size (Fr): 3  Initial Exposed Catheter (cm): 0 cm  Initial Extremit...   Site Assessment Clean;Dry;Intact   Line Status Blood return noted;Capped;Flushed;Saline locked   Length jaden (cm) 0 cm   Extremity Circumference (cm) 27 cm   Dressing Type Border Dressing;Antimicrobial dressing/disc;Securing device   Dressing Status Clean;Dry;Intact   Dressing Intervention New dressing   Liquid Adhesive Contraindicated (comment)   Dressing Change Due 12/15/22   Indication/Daily Review of Necessity blood sampling;intravenous fluid therapy;intravenous medication therapy   LOT#[id number] EXPIRES 2023-11-30    Midline inserted without difficulty for palliative iv meds.    3 needles. 2 wires, and 1 scalpel accounted for and disposed of properly at the end of line placement

## 2022-12-08 NOTE — PLAN OF CARE
Problem: Adult Inpatient Plan of Care  Goal: Patient-Specific Goal (Individualized)  Outcome: Ongoing, Progressing  Flowsheets (Taken 12/8/2022 9516)  Patient-Specific Goals (Include Timeframe): Premedicating prior to turns  Individualized Care Needs: Premedicating prior to turns  Anxieties, Fears or Concerns: NA   Goal Outcome Evaluation:           Progress: no change  Outcome Evaluation: Patient minimally responsive. Premedicating with 2mg of Dialudid, 2mg of Ativan, and 0.8mg of Robinul. Suctioning PRN and utilizing recovery position for increased congestion/secretions. Pt edematous. Midline to be placed today on day shift d/t continued loss of peripheral IVs. FC in place. Will continue to monitor for s/sx of discomfort. No guardian at [facility]sd this night shift.

## 2022-12-08 NOTE — PROGRESS NOTES
Case Management Discharge Note      Final Note: The patient  on 22 @ 14:10. B. Kiel Rn, CCP         Selected Continued Care - Admitted Since 2022     Destination Coordination complete.    Service Provider Selected Services Address Phone Fax Patient Preferred    [facility] [address], [facility] [id number] [phone] --          Durable Medical Equipment    No services have been selected for the patient.              Dialysis/Infusion    No services have been selected for the patient.              Home Medical Care    No services have been selected for the patient.              Therapy    No services have been selected for the patient.              Community Resources    No services have been selected for the patient.              Community & DME    No services have been selected for the patient.                Selected Continued Care - Prior Encounters Includes continued care and service providers with selected services from prior encounters from 2022 to 2022    Discharged on 2022 Admission date: 2022 - Discharge disposition: Hospice/Medical Facility ([facility])    Destination     Service Provider Selected Services Address Phone Fax Patient Preferred    [facility] [address], [facility] [address] [phone] --                         Final Discharge Disposition Code: [id number] -  in medical facility

## 2022-12-09 NOTE — PROGRESS NOTES
Palliative Care/Hospice Follow Up Note       LOS: 3 days   Patient Care Team:  Joel Villarreal MD as PCP - General (Internal Medicine)    Chief Complaint:  Senile degeneration of brain/dementia    Interval History:     Patient Complaints: None  Patient Denies:  None  History taken from:  RN  Review of Systems:  As above.    At the time of my examination, PPS 10%  Palliative Performance Scale  Palliative Performance Scale Score: 0%  [place] Symptom Assessment System Revised  Pain Score: no pain   ESAS Tiredness Score: Worst possible tiredness  ESAS Nausea Score: No nausea  ESAS Depression Score: unable to assess  ESAS Anxiety Score: No anxiety  ESAS Drowsiness Score: Worst possible drowsiness  ESAS Lack of Appetite Score: Worst lack of appetite  ESAS Wellbeing Score: unable to assess  ESAS Dyspnea Score: No shortness of breath  ESAS Other Problem Score: 6 (congestion)  ESAS Source of Information: healthcare professional caregiver  ESAS Intervention: medicated/see MAR  ESAS Intervention Response: tolerated    Vital Signs: At the time of my examination, heart rate 53 bpm & respirations 18  Temp:  [98.5 °F (36.9 °C)] 98.5 °F (36.9 °C)  Heart Rate:  [0-53] 0  Resp:  [0-18] 0  Device (Oxygen Therapy): room air SpO2:  [74 %] 74 %    Physical Exam:  General Appearance:    Not awake and in no acute distress lying on slightly on his right side, chronically ill-appearing elderly male   Throat:   No oral lesions, oral mucosa somewhat moist   Neck:   No adenopathy, supple, trachea midline   Lungs:     Clear to auscultation with scattered rhonchi, respirations diminished and not labored    Heart:    Irregular rhythm and normal rate   Abdomen:     Occasional bowel sounds, soft and non-tender, non-distended   Extremities:   No edema, muscle atrophy noted in the upper and lower extremities as well as loss of subcutaneous fat, toes cool, mottling noted lower extremities, cyanosis evident   Pulses:   Radial pulses barely palpable  and equal bilaterally          Results Review:     I reviewed the patient's new clinical results.    Medication Reviewed.    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  I reviewed with the RN.  With medications previously administered, the patient appears comfortable.  The patient has required glycopyrrolate for airway congestion.  The patient has required 4 doses of 2 mg Dilaudid, 7 doses yesterday, and 4 doses of 2 mg Ativan, 7 doses yesterday, thus far today.  Medications will be continued and adjusted as needed for symptom management for comfort.  Gradual decline evident.    Plan for disposition:  DANIKA Ahmadi MD  Hospice and Palliative Medicine  12/08/22  19:09 EST

## 2022-12-09 NOTE — DISCHARGE SUMMARY
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 male who presented to the ED 2022 due to gurgling after eating.  Upon EMS arrival, O2 saturation 50% range.  The patient was intubated in the emergency department.  Patient was admitted to the intensive care unit.  Infectious diseases did evaluate the patient and strep bacteremia as well as coag negative staph bacteremia identified and classified as likely contamination.  Antibiotics adjusted for aspiration pneumonia.  The patient was transferred out of the intensive care unit.  However, 2022 required readmission to the intensive care unit.  With care provided, the patient has demonstrated some improvement.  However, he has continued to require feeding tube.  Previously, the patient was a DNR and CCP working with legal guardian.  I was asked to evaluate the patient for second opinion concerning DNR/comfort care measures.  Subsequently, appropriately, the guardian allowed DNR and hospice/comfort care.  I have been asked to assume the patient's care since he is admitted as a hospice scattered bed patient. Medications were provided and continued and adjusted as needed for symptom management for comfort. Gradual decline noted. Please see my progress note from earlier today at 1340 hours.  Subsequently, I was called that the patient's respirations ceased and no pulse palpable. No heart sounds audible. I pronounced the patient at 1410 hours.    Time: I spent 25 minutes on this discharge activity which included: face-to-face encounter with the patient, reviewing the data in the system, coordination of the care with the nursing staff as well as documentation and entering orders.       Will Ahmadi MD  Hospice and Palliative Medicine  12/08/22  19:13 EST

## (undated) DEVICE — DRSNG GZ PETROLTM XEROFORM CURAD 1X8IN STRL

## (undated) DEVICE — GLV SURG TRIUMPH CLASSIC PF LTX 8 STRL

## (undated) DEVICE — APPL CHLORAPREP W/TINT 26ML ORNG

## (undated) DEVICE — TOWEL,OR,DSP,ST,BLUE,STD,4/PK,20PK/CS: Brand: MEDLINE

## (undated) DEVICE — MAT FLR ABSORBENT LG 4FT 10 2.5FT

## (undated) DEVICE — GW THRD TROC/PT 2.8X300MM

## (undated) DEVICE — Device

## (undated) DEVICE — SOL ISO/ALC RUB 70PCT 4OZ

## (undated) DEVICE — PK HIP PINNING 40

## (undated) DEVICE — SPNG GZ WOVN 4X4IN 12PLY 10/BX STRL

## (undated) DEVICE — ENCORE® LATEX ORTHO SIZE 8, STERILE LATEX POWDER-FREE SURGICAL GLOVE: Brand: ENCORE

## (undated) DEVICE — OCCLUSIVE GAUZE STRIP,3% BISMUTH TRIBROMOPHENATE IN PETROLATUM BLEND: Brand: XEROFORM

## (undated) DEVICE — 1000 S-DRAPE TOWEL DRAPE 10/BX: Brand: STERI-DRAPE™

## (undated) DEVICE — IMPLANTABLE DEVICE
Type: IMPLANTABLE DEVICE | Site: HIP | Status: NON-FUNCTIONAL
Removed: 2018-08-07

## (undated) DEVICE — SUT VIC 0 CT1 36IN J946H

## (undated) DEVICE — SUT VIC 2/0 CT1 36IN

## (undated) DEVICE — WASHR BONE 13MM
Type: IMPLANTABLE DEVICE | Site: HIP | Status: NON-FUNCTIONAL
Removed: 2018-08-07

## (undated) DEVICE — DRAPE,REIN 53X77,STERILE: Brand: MEDLINE

## (undated) DEVICE — STPLR SKIN VISISTAT WD 35CT

## (undated) DEVICE — GLV SURG BIOGEL LTX PF 8

## (undated) DEVICE — DRSNG SURESITE WNDW 4X4.5